# Patient Record
Sex: MALE | Race: WHITE | NOT HISPANIC OR LATINO | Employment: OTHER | URBAN - METROPOLITAN AREA
[De-identification: names, ages, dates, MRNs, and addresses within clinical notes are randomized per-mention and may not be internally consistent; named-entity substitution may affect disease eponyms.]

---

## 2017-01-19 ENCOUNTER — GENERIC CONVERSION - ENCOUNTER (OUTPATIENT)
Dept: OTHER | Facility: OTHER | Age: 67
End: 2017-01-19

## 2017-03-17 ENCOUNTER — TRANSCRIBE ORDERS (OUTPATIENT)
Dept: ADMINISTRATIVE | Facility: HOSPITAL | Age: 67
End: 2017-03-17

## 2017-03-17 ENCOUNTER — APPOINTMENT (OUTPATIENT)
Dept: LAB | Facility: MEDICAL CENTER | Age: 67
End: 2017-03-17
Payer: COMMERCIAL

## 2017-03-17 DIAGNOSIS — Z12.11 SPECIAL SCREENING FOR MALIGNANT NEOPLASMS, COLON: Primary | ICD-10-CM

## 2017-03-17 DIAGNOSIS — Z12.11 SPECIAL SCREENING FOR MALIGNANT NEOPLASMS, COLON: ICD-10-CM

## 2017-03-17 LAB — HEMOCCULT STL QL IA: POSITIVE

## 2017-03-17 PROCEDURE — G0328 FECAL BLOOD SCRN IMMUNOASSAY: HCPCS

## 2017-03-20 ENCOUNTER — GENERIC CONVERSION - ENCOUNTER (OUTPATIENT)
Dept: OTHER | Facility: OTHER | Age: 67
End: 2017-03-20

## 2017-04-03 ENCOUNTER — GENERIC CONVERSION - ENCOUNTER (OUTPATIENT)
Dept: OTHER | Facility: OTHER | Age: 67
End: 2017-04-03

## 2017-04-06 ENCOUNTER — GENERIC CONVERSION - ENCOUNTER (OUTPATIENT)
Dept: OTHER | Facility: OTHER | Age: 67
End: 2017-04-06

## 2017-04-18 ENCOUNTER — APPOINTMENT (OUTPATIENT)
Dept: LAB | Facility: MEDICAL CENTER | Age: 67
End: 2017-04-18
Payer: COMMERCIAL

## 2017-04-18 DIAGNOSIS — Z13.1 ENCOUNTER FOR SCREENING FOR DIABETES MELLITUS: ICD-10-CM

## 2017-04-18 DIAGNOSIS — R73.9 HYPERGLYCEMIA: ICD-10-CM

## 2017-04-18 DIAGNOSIS — Z86.39 PERSONAL HISTORY OF OTHER ENDOCRINE, NUTRITIONAL AND METABOLIC DISEASE: ICD-10-CM

## 2017-04-18 LAB
EST. AVERAGE GLUCOSE BLD GHB EST-MCNC: 108 MG/DL
GLUCOSE P FAST SERPL-MCNC: 101 MG/DL (ref 65–99)
HBA1C MFR BLD: 5.4 % (ref 4.2–6.3)
T3FREE SERPL-MCNC: 2.51 PG/ML (ref 2.3–4.2)
T4 FREE SERPL-MCNC: 0.78 NG/DL (ref 0.76–1.46)
TSH SERPL DL<=0.05 MIU/L-ACNC: 4.81 UIU/ML (ref 0.36–3.74)

## 2017-04-18 PROCEDURE — 84481 FREE ASSAY (FT-3): CPT

## 2017-04-18 PROCEDURE — 82947 ASSAY GLUCOSE BLOOD QUANT: CPT

## 2017-04-18 PROCEDURE — 84439 ASSAY OF FREE THYROXINE: CPT

## 2017-04-18 PROCEDURE — 84443 ASSAY THYROID STIM HORMONE: CPT

## 2017-04-18 PROCEDURE — 36415 COLL VENOUS BLD VENIPUNCTURE: CPT

## 2017-04-18 PROCEDURE — 83036 HEMOGLOBIN GLYCOSYLATED A1C: CPT

## 2017-05-03 ENCOUNTER — ALLSCRIPTS OFFICE VISIT (OUTPATIENT)
Dept: OTHER | Facility: OTHER | Age: 67
End: 2017-05-03

## 2017-05-03 DIAGNOSIS — H61.22 IMPACTED CERUMEN OF LEFT EAR: ICD-10-CM

## 2017-05-08 ENCOUNTER — APPOINTMENT (OUTPATIENT)
Dept: LAB | Facility: MEDICAL CENTER | Age: 67
End: 2017-05-08
Payer: COMMERCIAL

## 2017-05-08 ENCOUNTER — HOSPITAL ENCOUNTER (OUTPATIENT)
Dept: RADIOLOGY | Facility: MEDICAL CENTER | Age: 67
Discharge: HOME | End: 2017-05-08
Payer: COMMERCIAL

## 2017-05-08 DIAGNOSIS — H61.22 IMPACTED CERUMEN OF LEFT EAR: ICD-10-CM

## 2017-05-08 LAB — TSH SERPL DL<=0.05 MIU/L-ACNC: 4.17 UIU/ML (ref 0.36–3.74)

## 2017-05-08 PROCEDURE — 84443 ASSAY THYROID STIM HORMONE: CPT

## 2017-05-08 PROCEDURE — 36415 COLL VENOUS BLD VENIPUNCTURE: CPT

## 2017-05-08 PROCEDURE — 76536 US EXAM OF HEAD AND NECK: CPT

## 2017-05-16 ENCOUNTER — ALLSCRIPTS OFFICE VISIT (OUTPATIENT)
Dept: OTHER | Facility: OTHER | Age: 67
End: 2017-05-16

## 2017-08-15 ENCOUNTER — ALLSCRIPTS OFFICE VISIT (OUTPATIENT)
Dept: OTHER | Facility: OTHER | Age: 67
End: 2017-08-15

## 2017-10-11 DIAGNOSIS — R73.9 HYPERGLYCEMIA: ICD-10-CM

## 2017-10-11 DIAGNOSIS — E03.9 HYPOTHYROIDISM: ICD-10-CM

## 2017-10-24 ENCOUNTER — APPOINTMENT (OUTPATIENT)
Dept: LAB | Facility: MEDICAL CENTER | Age: 67
End: 2017-10-24
Payer: COMMERCIAL

## 2017-10-24 DIAGNOSIS — R73.9 HYPERGLYCEMIA: ICD-10-CM

## 2017-10-24 DIAGNOSIS — E03.9 HYPOTHYROIDISM: ICD-10-CM

## 2017-10-24 LAB
ALBUMIN SERPL BCP-MCNC: 3.5 G/DL (ref 3.5–5)
ALP SERPL-CCNC: 66 U/L (ref 46–116)
ALT SERPL W P-5'-P-CCNC: 26 U/L (ref 12–78)
ANION GAP SERPL CALCULATED.3IONS-SCNC: 9 MMOL/L (ref 4–13)
AST SERPL W P-5'-P-CCNC: 17 U/L (ref 5–45)
BASOPHILS # BLD AUTO: 0.03 THOUSANDS/ΜL (ref 0–0.1)
BASOPHILS NFR BLD AUTO: 0 % (ref 0–1)
BILIRUB SERPL-MCNC: 0.59 MG/DL (ref 0.2–1)
BUN SERPL-MCNC: 16 MG/DL (ref 5–25)
CALCIUM SERPL-MCNC: 9.2 MG/DL (ref 8.3–10.1)
CHLORIDE SERPL-SCNC: 105 MMOL/L (ref 100–108)
CHOLEST SERPL-MCNC: 175 MG/DL (ref 50–200)
CO2 SERPL-SCNC: 25 MMOL/L (ref 21–32)
CREAT SERPL-MCNC: 0.85 MG/DL (ref 0.6–1.3)
EOSINOPHIL # BLD AUTO: 0.21 THOUSAND/ΜL (ref 0–0.61)
EOSINOPHIL NFR BLD AUTO: 3 % (ref 0–6)
ERYTHROCYTE [DISTWIDTH] IN BLOOD BY AUTOMATED COUNT: 12.8 % (ref 11.6–15.1)
GFR SERPL CREATININE-BSD FRML MDRD: 90 ML/MIN/1.73SQ M
GLUCOSE P FAST SERPL-MCNC: 103 MG/DL (ref 65–99)
HCT VFR BLD AUTO: 40.8 % (ref 36.5–49.3)
HDLC SERPL-MCNC: 71 MG/DL (ref 40–60)
HGB BLD-MCNC: 14.3 G/DL (ref 12–17)
LDLC SERPL CALC-MCNC: 86 MG/DL (ref 0–100)
LYMPHOCYTES # BLD AUTO: 1.53 THOUSANDS/ΜL (ref 0.6–4.47)
LYMPHOCYTES NFR BLD AUTO: 19 % (ref 14–44)
MCH RBC QN AUTO: 32.9 PG (ref 26.8–34.3)
MCHC RBC AUTO-ENTMCNC: 35 G/DL (ref 31.4–37.4)
MCV RBC AUTO: 94 FL (ref 82–98)
MONOCYTES # BLD AUTO: 0.74 THOUSAND/ΜL (ref 0.17–1.22)
MONOCYTES NFR BLD AUTO: 9 % (ref 4–12)
NEUTROPHILS # BLD AUTO: 5.39 THOUSANDS/ΜL (ref 1.85–7.62)
NEUTS SEG NFR BLD AUTO: 69 % (ref 43–75)
NRBC BLD AUTO-RTO: 0 /100 WBCS
PLATELET # BLD AUTO: 224 THOUSANDS/UL (ref 149–390)
PMV BLD AUTO: 10.7 FL (ref 8.9–12.7)
POTASSIUM SERPL-SCNC: 4 MMOL/L (ref 3.5–5.3)
PROT SERPL-MCNC: 7.6 G/DL (ref 6.4–8.2)
RBC # BLD AUTO: 4.34 MILLION/UL (ref 3.88–5.62)
SODIUM SERPL-SCNC: 139 MMOL/L (ref 136–145)
T3FREE SERPL-MCNC: 2.71 PG/ML (ref 2.3–4.2)
T4 FREE SERPL-MCNC: 0.86 NG/DL (ref 0.76–1.46)
TRIGL SERPL-MCNC: 88 MG/DL
TSH SERPL DL<=0.05 MIU/L-ACNC: 2.95 UIU/ML (ref 0.36–3.74)
WBC # BLD AUTO: 7.91 THOUSAND/UL (ref 4.31–10.16)

## 2017-10-24 PROCEDURE — 36415 COLL VENOUS BLD VENIPUNCTURE: CPT

## 2017-10-24 PROCEDURE — 84481 FREE ASSAY (FT-3): CPT

## 2017-10-24 PROCEDURE — 84439 ASSAY OF FREE THYROXINE: CPT

## 2017-10-24 PROCEDURE — 85025 COMPLETE CBC W/AUTO DIFF WBC: CPT

## 2017-10-24 PROCEDURE — 84443 ASSAY THYROID STIM HORMONE: CPT

## 2017-10-24 PROCEDURE — 80061 LIPID PANEL: CPT

## 2017-10-24 PROCEDURE — 80053 COMPREHEN METABOLIC PANEL: CPT

## 2017-11-09 ENCOUNTER — ALLSCRIPTS OFFICE VISIT (OUTPATIENT)
Dept: OTHER | Facility: OTHER | Age: 67
End: 2017-11-09

## 2017-11-09 LAB
BILIRUB UR QL STRIP: NORMAL
CLARITY UR: NORMAL
COLOR UR: YELLOW
GLUCOSE (HISTORICAL): NORMAL
HGB UR QL STRIP.AUTO: NORMAL
KETONES UR STRIP-MCNC: NORMAL MG/DL
LEUKOCYTE ESTERASE UR QL STRIP: NORMAL
NITRITE UR QL STRIP: NORMAL
PH UR STRIP.AUTO: 5 [PH]
PROT UR STRIP-MCNC: NORMAL MG/DL
SP GR UR STRIP.AUTO: 1.02
UROBILINOGEN UR QL STRIP.AUTO: NORMAL

## 2017-12-12 ENCOUNTER — ALLSCRIPTS OFFICE VISIT (OUTPATIENT)
Dept: OTHER | Facility: OTHER | Age: 67
End: 2017-12-12

## 2017-12-13 NOTE — PROGRESS NOTES
Assessment    1  Glenohumeral arthritis, right (715 91) (M19 011)    Plan  Glenohumeral arthritis, right    · Follow-up visit in 3 months Evaluation and Treatment  Follow-up  Status: Hold For -Scheduling  Requested for: [id number]    Chief Complaint    1  Shoulder Pain    Discussion/Summary    We again discussed treatment options  We discussed continued conservative management versus surgical intervention  He does not want to pursue any surgical options at this time  He elects to proceed with repeat corticosteroid injection which was performed without complication  Follow-up 3-4 months as needed for repeat injection  History of Present Illness  71-year-old male here for follow-up of right shoulder pain  He had excellent relief after corticosteroid injection performed approximately 4 months ago  He started to have insidious onset of worsening pain in the anterior aspect of the shoulder few weeks ago  No new injuries  No other complaints  Review of Systems   Constitutional: No fever or chills, feels well, no tiredness, no recent weight loss or weight gain  Musculoskeletal: as noted in HPI  Neurological: No complaints of headache, no confusion, no numbness or tingling, no dizziness  Active Problems  1  Bilateral impacted cerumen (380 4) (H61 23)   2  Elevated blood sugar level (790 29) (R73 9)   3  Elevated PSA (790 93) (R97 20)   4  Encounter for screening for lipid disorder (V77 91) (Z13 220)   5  Encounter for screening for malignant neoplasm of prostate (V76 44) (Z12 5)   6  Glenohumeral arthritis, right (715 91) (M19 011)   7  Hypothyroidism (244 9) (E03 9)   8  Medicare annual wellness visit, initial (V70 0) (Z00 00)   9  Medicare annual wellness visit, subsequent (V70 0) (Z00 00)   10  Need for influenza vaccination (V04 81) (Z23)   11  Screening for colon cancer (V76 51) (Z12 11)   12  Screening for diabetes mellitus (V77 1) (Z13 1)   13   Screening for thyroid disorder (V77 0) (Z13 29)    Past Medical History   · History of Denial of substance abuse   · Denied: History of mental disorder   · History of thyroid disease (V12 29) (Z86 39)   · History of Left knee pain (719 46) (M25 562)   · History of Primary osteoarthritis of left knee (715 16) (M17 12)   · History of Shoulder pain (719 41) (M25 519)    The active problems and past medical history were reviewed and updated today  Surgical History   · History of Radiation Therapy    Family History  Mother    · Family history of dementia (V17 2) (Z80 11)  Father    · Family history of congestive heart failure (V17 49) (Z82 49)  Family History    · Denied: Family history of Denial of substance abuse   · Denied: No family history of mental disorder    Social History     · Never a smoker   · Occasional alcohol use  The social history was reviewed and updated today  The social history was reviewed and is unchanged  Current Meds   1  Move Free 500-400 MG TABS; Therapy: (Recorded:26Oxs9291) to Recorded   2  Saw Palmetto Oral Capsule; TAKE 1 CAPSULE; Therapy: 99ARF6831 to Recorded    The medication list was reviewed and updated today  Allergies  1  No Known Drug Allergies    Vitals   Recorded: 88Fya4696 10:44AM   Heart Rate 70   Systolic 115   Diastolic 91   Height 5 ft 8 5 in   Weight 201 lb    BMI Calculated 30 12   BSA Calculated 2 05       Physical Exam   Constitutional - General appearance: Normal   Musculoskeletal - Gait and station: Normal   Right shoulder: Skin is intact  There is no tenderness  Active forward elevation 150Â°, external rotation 55Â°, internal rotation to T12  Negative belly press  Strength is 5 out of 5 in elevation, supraspinatus, internal rotation, external rotation  Sensation is intact to light touch in axillary, lateral antebrachial cutaneous, median, ulnar, and radial distributions  Radial pulse 2+      Procedure    Procedure: Injection of the right glenohumeral joint  Indication:  osteoarthritis   Potential complications include bleeding,-- infection-- and-- allergic reaction  Risk, benefits and alternatives were discussed with the patient  Verbal consent was obtained prior to the procedure  Alcohol was used to prep the area  ethyl chloride spray was used as a topical anesthetic  Using sterile technique, the aspiration/injection needle was then directed from a anterior aspect  Was used to inject 21,-- 4 mL of 1% Lidocaine,-- 4 mL 0 25% Bupivacaine-- and-- 1 mL of 40mg/mL triamcinolone  A bandage was applied  the patient tolerated the procedure well  Complications: none  Future Appointments    Date/Time Provider Specialty Site   05/09/2018 01:00 PM JUDY Mack   Family Medicine [facility]       Signatures   Electronically signed by : JUDY Moore ; Dec 12 2017 11:08AM EST                       (Author)

## 2018-01-10 NOTE — MISCELLANEOUS
Message   Recorded as Task   Date: 03/19/2017 04:46 PM, Created By: Faisal Guevara   Task Name: Result Follow Up   Assigned To: Lucio Hargrove   Regarding Patient: Darrian Benites, Status: Active   Comment:    Corry Augustin - 19 Mar 2017 4:46 PM     TASK CREATED  Positive FIT   spoke with pt  positve fobt  colonoscopy ordered  mail referral to pt's home      Plan  Fecal occult blood test positive, Screening for colon cancer    · 2 - *[facility]OLANDO&KAMIL[facility] ( COLORECTAL SURGERY, GASTROENTEROLOGY) Physician  Referral  Consult Only: the expectation is that the  referring provider will communicate back to the patient on treatment options  Evaluation  and Treatment: the expectation is that the referred to provider will communicate back  to the patient on treatment options    Status: Hold For - Scheduling  Requested  for: 15QCY8048  Care Summary provided  : Yes    Signatures   Electronically signed by : Bishop Benavides, [facility]; Mar 20 2017 12:59PM EST                       (Author)

## 2018-01-10 NOTE — PROGRESS NOTES
Assessment    1  Elevated blood sugar level (790 29) (R73 9)   2  Bilateral impacted cerumen (380 4) (H61 23)   3  Elevated PSA (790 93) (R97 20)   4  Hypothyroidism (244 9) (E03 9)   5  Medicare annual wellness visit, subsequent (V70 0) (Z00 00)    Plan  Elevated blood sugar level, Elevated PSA    · (1) CBC/PLT/DIFF; Status:Active; Requested for:01May2018;    · (1) COMPREHENSIVE METABOLIC PANEL; Status:Active; Requested for:01May2018;    · (1) HEMOGLOBIN A1C; Status:Active; Requested for:01May2018;    · (1) PSA, DIAGNOSTIC (FOLLOW-UP); Status:Active; Requested for:01May2018;   Elevated blood sugar level, Elevated PSA, Hypothyroidism    · (1) LIPID PANEL, FASTING; Status:Active; Requested for:01May2018;    · (1) TSH; Status:Active; Requested for:01May2018; Health Maintenance    · Urine Dip Non-Automated- POC; Status:Complete;   Done: 78LRU8215 02:36PM    Discussion/Summary    Check labs in 6 months before next visit, continue healthy habits  Impression: Subsequent Annual Wellness Visit  Cardiovascular screening and counseling: screening is current  Diabetes screening and counseling: screening is current  Colorectal cancer screening and counseling: screening is current  Prostate cancer screening and counseling: due for PSA  Glaucoma screening and counseling: screening is current  Immunizations: influenza vaccine is up to date this year, influenza vaccination is recommended annually, the lifetime pneumococcal vaccine has been completed and Zostavax vaccination up to date  Advance Directive Planning: complete and up to date  Patient Discussion: plan discussed with the patient  Possible side effects of new medications were reviewed with the patient/guardian today  The treatment plan was reviewed with the patient/guardian  The patient/guardian understands and agrees with the treatment plan      Chief Complaint  Patient presents for Annual Well visit        History of Present Illness  HPI: here for yoselin   Welcome to [employer] and Wellness Visits: The patient is being seen for the subsequent annual wellness visit  Medicare Screening and Risk Factors   Hospitalizations: he has been previously hospitalizied and nothing in the last year  Medicare Screening Tests Risk Questions   Abdominal aortic aneurysm risk assessment: none indicated  Osteoporosis risk assessment: none indicated  HIV risk assessment: none indicated  Drug and Alcohol Use: The patient has never smoked cigarettes  The patient reports occasional alcohol use  Diet and Physical Activity: Current diet includes well balanced meals and 1-2 cups of tea per day  He exercises daily  Exercise: walking, stretching, strength training  Mood Disorder and Cognitive Impairment Screening: Anxiety screening no anxiety  Depression screening  negative for symptoms  Functional Ability/Level of Safety: Hearing is slightly decreased  He reports hearing difficulties  He does not use a hearing aid  The patient is currently able to do activities of daily living without limitations, able to do instrumental activities of daily living without limitations, able to participate in social activities without limitations and able to drive without limitations  Advance Directives: Advance directives: living will  Co-Managers and Medical Equipment/Suppliers: See Patient Care Team      Patient Care Team    Care Team Member Role Specialty Office Number   Franci Waddell [facility]  Family Medicine [phone]     Active Problems    1  Elevated blood sugar level (790 29) (R73 9)   2  Elevated PSA (790 93) (R97 20)   3  Encounter for screening for lipid disorder (V77 91) (Z13 220)   4  Encounter for screening for malignant neoplasm of prostate (V76 44) (Z12 5)   5  Glenohumeral arthritis, right (715 91) (M19 011)   6  Hypothyroidism (244 9) (E03 9)   7  Medicare annual wellness visit, initial (V70 0) (Z00 00)   8  Need for influenza vaccination (V04 81) (Z23)   9  Screening for colon cancer (V76 51) (Z12 11)   10  Screening for diabetes mellitus (V77 1) (Z13 1)   11  Screening for thyroid disorder (V77 0) (Z13 29)    Past Medical History    · History of Denial of substance abuse   · Denied: History of mental disorder   · History of thyroid disease (V12 29) (Z86 39)    Surgical History    · History of Radiation Therapy    The surgical history was reviewed and updated today  Family History  Mother    · Family history of dementia (V17 2) (Z80 11)  Father    · Family history of congestive heart failure (V17 49) (Z82 49)  Family History    · Denied: Family history of Denial of substance abuse   · Denied: No family history of mental disorder    The family history was reviewed and updated today  Social History    · Never a smoker   · Occasional alcohol use  The social history was reviewed and updated today  Current Meds   1  Move Free 500-400 MG TABS; Therapy: (Recorded:09Nov2017) to Recorded   2  Saw Palmetto Oral Capsule; TAKE 1 CAPSULE; Therapy: 16DSM2655 to Recorded    The medication list was reviewed and updated today  Allergies    1  No Known Drug Allergies    Immunizations   1    Influenza  13-Oct-2016    PPSV  Approx [id number]     Vitals  Signs    Temperature: 97 3 F, Tympanic  Heart Rate: 91, R Brachial Artery  Pulse Quality: Normal, R Brachial Artery  Respiration Quality: Normal  Systolic: 493, LUE, Sitting  Diastolic: 76, LUE, Sitting  BP Cuff Size: Large  Height: 5 ft 8 5 in  Weight: 201 lb   BMI Calculated: 30 12  BSA Calculated: 2 06  O2 Saturation: 98    Physical Exam    Constitutional   General appearance: No acute distress, well appearing and well nourished  Eyes   Conjunctiva and lids: No erythema, swelling or discharge  Pupils and irises: Equal, round, reactive to light  Ears, Nose, Mouth, and Throat   External inspection of ears and nose: Normal     Otoscopic examination: Abnormal   cerumen impaction b/l     Hearing: Abnormal  Nasal mucosa, septum, and turbinates: Normal without edema or erythema  Lips, teeth, and gums: Normal, good dentition  Oropharynx: Normal with no erythema, edema, exudate or lesions  Neck   Neck: Supple, symmetric, trachea midline, no masses  Thyroid: Normal, no thyromegaly  Pulmonary   Respiratory effort: No increased work of breathing or signs of respiratory distress  Auscultation of lungs: Clear to auscultation  Cardiovascular   Auscultation of heart: Normal rate and rhythm, normal S1 and S2, no murmurs  Examination of extremities for edema and/or varicosities: Normal     Abdomen   Abdomen: Non-tender, no masses  Liver and spleen: No hepatomegaly or splenomegaly  Lymphatic   Palpation of lymph nodes in neck: No lymphadenopathy  Musculoskeletal   Gait and station: Normal     Inspection/palpation of joints, bones, and muscles: Normal     Range of motion: Normal     Stability: Normal     Muscle strength/tone: Normal     Neurologic   Cortical function: Normal mental status  Reflexes: 2+ and symmetric  Psychiatric   Judgment and insight: Normal     Orientation to person, place and time: Normal     Recent and remote memory: Intact  Mood and affect: Normal        Results/Data  Urine Dip Non-Automated- POC 01XKA2306 02:36PM Neves Man     Test Name Result Flag Reference   Color Yellow     Clarity Transparent     Leukocytes NEG     Nitrite NEG     Blood NEG     Bilirubin NEG     Urobilinogen NEG     Protein NEG     Ph 5     Specific Gravity 1 020     Ketone NEG     Glucose NEG         Procedure    Procedure: cerumen removal    Indication: tympanic membrane(s) could not be visualized and cerumen impaction in both ears  Procedure Note: The procedure was performed by the Provider  A otoscope was placed in the ear canal(s) to visualize the ear canal debris  The ear was cleaned by using warm water irrigation  The procedure was partially successful     Post-Procedure: Patient Status: the patient tolerated the procedure well  Complications: there were no complications  Follow-up in 5 day(s) and try debrox to loosen wax in L ear  Health Management  History of Fecal occult blood test positive   COLONOSCOPY; every 10 years; Last [id number]; Next Due: 05Rgs3022; Active  Screening for colon cancer   COLONOSCOPY; every 10 years; Last 46BTV1820; Next Due: 41Oks0016; Active    Future Appointments    Date/Time Provider Specialty Site   05/09/2018 01:00 PM JUDY Michelle  [address] [place] [address]   12/12/2017 10:45 AM JUDY Higgins   [facility]     Signatures   Electronically signed by : JUDY Nino ; Nov 9 2017  8:58PM EST                       (Author)

## 2018-01-12 VITALS
BODY MASS INDEX: 27.6 KG/M2 | DIASTOLIC BLOOD PRESSURE: 81 MMHG | SYSTOLIC BLOOD PRESSURE: 122 MMHG | HEART RATE: 84 BPM | WEIGHT: 197.19 LBS | HEIGHT: 71 IN

## 2018-01-12 NOTE — PROGRESS NOTES
Assessment    1  Medicare annual wellness visit, initial (V70 0) (Z00 00)    Plan  Elevated blood sugar level    · (1) HEMOGLOBIN A1C; Status:Active; Requested for:01Apr2017;    · (1) TSH; Status:Active; Requested for:01Apr2017;   Elevated PSA    · *1 - [facility] Physician Referral  Consult  Status: Permanent  Deferral - pt  declines at present  time  Care Summary provided  : Yes  PMH: History of thyroid disease    · (1) FREE T3; Status:Active; Requested for:01Apr2017;    · (1) T4, FREE; Status:Active; Requested for:01Apr2017;   PMH: History of thyroid disease, Screening for diabetes mellitus    · (1) GLUCOSE,  FASTING; Status:Active; Requested for:01Apr2017;   Screening for colon cancer    · (1) OCCULT BLOOD, FECAL IMMUNOCHEMICAL TEST; Status:Active; Requested  for:02Nov2016;     Discussion/Summary    Repeat fbs and thyroid labs in 6 months  psa in one year  Impression: Initial Annual Wellness Visit, with preventive exam as well as age and risk appropriate counseling completed  Diabetes screening and counseling: screening is current  Colorectal cancer screening and counseling: the patient declines screening and due for fecal occult blood testing  Prostate cancer screening and counseling: screening is current  Osteoporosis screening and counseling: counseling was given on the importance of regular weightbearing exercise  Abdominal aortic aneurysm screening and counseling: screening not indicated  Glaucoma screening and counseling: screening is current  HIV screening and counseling: screening not indicated  Immunizations: influenza vaccine is up to date this year, prevnar 13 due next year  and the patient declines the Zostavax vaccine  Advance Directive Planning: complete and up to date, need copy for chart  Patient Discussion: follow-up visit needed in one year        Chief Complaint  AWV      History of Present Illness  HPI: pt presents for nanual well visit  labs reviewed with pt t4 slighly low but tsha dn t3 are normal  pt gives hostory of thyroid disorder as a child  will repeat tsh and t4 in 6 months  fbs is 110  pt follows alow sugar diet  exxercise discussed  will recheck fbs and hba1c in 6 months  lipds at goal with diet  psa is elevated at 6  pt states this is lower than last year  he has been to urology in the past and does not feel the need to go back to urology at this time  his homeopathic physician put him on saw palmetto, old records need to be obtained from prior physciian  pt did bring in some old labs  Welcome to [employer] and Wellness Visits: The patient is being seen for the initial annual wellness visit  Medicare Screening and Risk Factors   Hospitalizations: no previous hospitalizations  Once per lifetime medicare screening tests: ECG has not been done  Medicare Screening Tests Risk Questions   Abdominal aortic aneurysm risk assessment: family history of AAA  Osteoporosis risk assessment: , over 48years of age, past medical history of fracture(s) and wrist    HIV risk assessment: none indicated  Drug and Alcohol Use: The patient has never smoked cigarettes  The patient reports frequent alcohol use and drinking 1 drinks per day  He has never used illicit drugs  Diet and Physical Activity: Current diet includes well balanced meals  He exercises daily and yard work  Exercise: yard work  Mood Disorder and Cognitive Impairment Screening: He denies feeling down, depressed, or hopeless over the past two weeks  He denies feeling little interest or pleasure in doing things over the past two weeks  Cognitive impairment screening: denies difficulty learning/retaining new information, denies difficulty handling complex tasks, denies difficulty with reasoning, denies difficulty with spatial ability and orientation, denies difficulty with language and denies difficulty with behavior     Functional Ability/Level of Safety: Hearing is slightly decreased in the right ear, significantly decreased in the left ear and a hearing aid is not used  The patient is currently able to do activities of daily living without limitations, able to do instrumental activities of daily living without limitations, able to participate in social activities without limitations and able to drive without limitations  Activities of daily living details: does not need help using the phone, no transportation help needed, does not need help shopping, no meal preparation help needed, does not need help doing housework, does not need help doing laundry, does not need help managing medications and does not need help managing money  Fall risk factors:  alcohol use, but The patient fell 0 times in the past 12 months , no antidepressant use, no deconditioning, no visual impairment, no urinary incontinence, no antihypertensive use, no cognitive impairment and no previous fall  Home safety risk factors:  no unfamiliar surroundings, no loose rugs, no poor household lighting, no uneven floors, no household clutter, grab bars in the bathroom and handrails on the stairs  Advance Directives: Advance directives: living will, durable power of  for health care directives and advance directives  Co-Managers and Medical Equipment/Suppliers: See Patient Care Team      Patient Care Team    Care Team Member Role Specialty Office Number   Katia Bun [address] [facility] [phone]     Review of Systems    Constitutional: no fever  ENT: no sore throat and no nasal congestion  Cardiovascular: no chest pain and no lower extremity edema  Respiratory: no shortness of breath, no wheezing and no cough  Gastrointestinal: no abdominal pain, no nausea, no vomiting, no diarrhea, no constipation, no bright red blood per rectum and no melena  Genitourinary: nocturia, but no dysuria, no urinary incontinence and no urinary hesitancy  Musculoskeletal: joint stiffness     Integumentary and Breasts: no rashes and no edema  Neurological: no headache and no dizziness  Psychiatric: no insomnia  Hematologic and Lymphatic: no tendency for easy bleeding and no tendency for easy bruising  Active Problems    1  Elevated PSA (790 93) (R97 20)   2  Encounter for screening for lipid disorder (V77 91) (Z13 220)   3  Encounter for screening for malignant neoplasm of prostate (V76 44) (Z12 5)   4  Glenohumeral arthritis, right (715 91) (M19 011)   5  Internal derangement of knee, left (717 9) (M23 92)   6  Left knee pain (719 46) (M25 562)   7  Need for influenza vaccination (V04 81) (Z23)   8  Primary osteoarthritis of left knee (715 16) (M17 12)   9  Screening for diabetes mellitus (V77 1) (Z13 1)   10  Screening for thyroid disorder (V77 0) (Z13 29)   11  Shoulder joint stiffness, right (719 51) (M25 611)   12  Shoulder pain (719 41) (M25 519)   13  Swelling of knee joint, left (719 06) (M25 462)    Past Medical History    · History of thyroid disease (V12 29) (Z86 39)    Surgical History    · History of Radiation Therapy    Social History    · Never a smoker   · None   · Occasional alcohol use  The social history was reviewed and updated today  The social history was reviewed and is unchanged  Current Meds   1  Saw [place] Oral Capsule; TAKE 1 CAPSULE; Therapy: [id number] to Recorded    The medication list was reviewed and updated today  Allergies    1  No Known Drug Allergies    Immunizations   1    Influenza  13-Oct-2016    PPSV  Approx [id number]     Vitals  Signs    Systolic: 771, LUE, Sitting  Diastolic: 88, LUE, Sitting  Heart Rate: 80, L Brachial Artery  Pulse Quality: Normal, L Brachial Artery  Respiration Quality: Normal  Respiration: 16  Temperature: 97 5 F, Tympanic  Height: 5 ft 11 in  Weight: 201 lb   BMI Calculated: 28 03  BSA Calculated: 2 11    Physical Exam    Constitutional   General appearance: No acute distress, well appearing and well nourished      Head and Face   Head and face: Normal     Eyes   Conjunctiva and lids: No erythema, swelling or discharge  Pupils and irises: Equal, round, reactive to light  Ears, Nose, Mouth, and Throat   External inspection of ears and nose: Normal     Otoscopic examination: Tympanic membranes translucent with normal light reflex  Canals patent without erythema  Lips, teeth, and gums: Normal, good dentition  Oropharynx: Normal with no erythema, edema, exudate or lesions  Neck   Neck: Supple, symmetric, trachea midline, no masses  Thyroid: Normal, no thyromegaly  Pulmonary   Respiratory effort: No increased work of breathing or signs of respiratory distress  Auscultation of lungs: Clear to auscultation  Cardiovascular   Auscultation of heart: Normal rate and rhythm, normal S1 and S2, no murmurs  Carotid pulses: 2+ bilaterally  Examination of extremities for edema and/or varicosities: Normal     Abdomen   Abdomen: Non-tender, no masses  Liver and spleen: No hepatomegaly or splenomegaly  Lymphatic   Palpation of lymph nodes in neck: No lymphadenopathy  Musculoskeletal   Gait and station: Normal     Inspection/palpation of digits and nails: Normal without clubbing or cyanosis  Examination of the extremities revealed no fingernail clubbing and well perfused fingers  Muscle strength/tone: Normal     Skin   Skin and subcutaneous tissue: Normal without rashes or lesions  Neurologic   Cranial nerves: Cranial nerves 2-12 intact  Cranial Nerve II: visual acuity and visual fields were intact  Cranial Nerves III, IV, and VI: the extraocular motions were intact  Cranial Nerve V: sensation to the face and masseter strength were intact  Cranial Nerve VII: facial strength was intact bilaterally  Reflexes: 2+ and symmetric  Deep tendon reflexes: 2+ right brachioradialis, 2+ left brachioradialis, 2+ right patella and 2+ left patella     Psychiatric   Judgment and insight: Normal     Mood and affect: Normal  Procedure    Procedure: Visual Acuity Test    Indication: routine screening  Inforrmation supplied by a Snellen chart  Results: 20/13 in both eyes with corrective device, 20/13 in the right eye with corrective device, 20/13 in the left eye with corrective device normal in both eyes  Color vision was and the results were normal    The patient tolerated the procedure well  There were no complications        Future Appointments    Date/Time Provider Specialty Site   05/03/2017 01:15 PM Elba Doherty, [facility]     Signatures   Electronically signed by : Chace Larios, [facility]; Nov  3 2016 12:08PM EST                       (Author)    Electronically signed by : JUDY Melendez ; Nov  3 2016 12:26PM EST                       (Author)

## 2018-01-13 VITALS
SYSTOLIC BLOOD PRESSURE: 122 MMHG | HEART RATE: 87 BPM | TEMPERATURE: 97 F | RESPIRATION RATE: 16 BRPM | OXYGEN SATURATION: 96 % | DIASTOLIC BLOOD PRESSURE: 86 MMHG | HEIGHT: 71 IN | WEIGHT: 199.2 LBS | BODY MASS INDEX: 27.89 KG/M2

## 2018-01-14 VITALS
HEIGHT: 69 IN | DIASTOLIC BLOOD PRESSURE: 76 MMHG | WEIGHT: 201 LBS | TEMPERATURE: 97.3 F | OXYGEN SATURATION: 98 % | SYSTOLIC BLOOD PRESSURE: 120 MMHG | HEART RATE: 91 BPM | BODY MASS INDEX: 29.77 KG/M2

## 2018-01-15 VITALS
BODY MASS INDEX: 27.89 KG/M2 | HEIGHT: 71 IN | SYSTOLIC BLOOD PRESSURE: 123 MMHG | WEIGHT: 199.19 LBS | HEART RATE: 85 BPM | DIASTOLIC BLOOD PRESSURE: 77 MMHG

## 2018-01-15 NOTE — PROGRESS NOTES
Assessment    1  Elevated blood sugar level (790 29) (R73 9)   2  Bilateral impacted cerumen (380 4) (H61 23)   3  Elevated PSA (790 93) (R97 20)   4  Hypothyroidism (244 9) (E03 9)   5  Medicare annual wellness visit, subsequent (V70 0) (Z00 00)    Plan  Elevated blood sugar level, Elevated PSA    · (1) CBC/PLT/DIFF; Status:Active; Requested for:01May2018;    · (1) COMPREHENSIVE METABOLIC PANEL; Status:Active; Requested for:01May2018;    · (1) HEMOGLOBIN A1C; Status:Active; Requested for:01May2018;    · (1) PSA, DIAGNOSTIC (FOLLOW-UP); Status:Active; Requested for:01May2018;   Elevated blood sugar level, Elevated PSA, Hypothyroidism    · (1) LIPID PANEL, FASTING; Status:Active; Requested for:01May2018;    · (1) TSH; Status:Active; Requested for:01May2018; Health Maintenance    · Urine Dip Non-Automated- POC; Status:Complete;   Done: [id number] 02:36PM    Discussion/Summary  health maintenance visit Currently, he eats a healthy diet and has an adequate exercise regimen  Prostate cancer screening: PSA was ordered  Colorectal cancer screening: colorectal cancer screening is current  The immunizations are up to date  Check labs in 6 months before next visit, continue healthy habits  Possible side effects of new medications were reviewed with the patient/guardian today  The treatment plan was reviewed with the patient/guardian  The patient/guardian understands and agrees with the treatment plan      Chief Complaint  here for physical and to discuss chronic conditions      History of Present Illness  , Adult Male: The patient is being seen for a health maintenance evaluation  General Health: The patient's health since the last visit is described as good  He has regular dental visits  He complains of vision problems  Vision care includes having regular eye examinations  He has hearing loss  Immunizations status: up to date  Lifestyle:  He consumes a diverse and healthy diet   He does not have any weight concerns  He exercises regularly  He does not use tobacco  He consumes alcohol  He denies drug use  Reproductive health:  the patient is sexually active  He denies erectile dysfunction  Screening:   HPI: here to review labs, lipids improved with small changes in diet, stays active  PSA has been high in the past, runs   5-6  at recent colonoscopy, prostate exam was normal  no meds, few supplements  healthy eating and regular exercise  Review of Systems    Constitutional: No fever or chills, feels well, no tiredness, no recent weight gain or weight loss  Cardiovascular: No complaints of slow heart rate, no fast heart rate, no chest pain, no palpitations, no leg claudication, no lower extremity  Respiratory: No complaints of shortness of breath, no wheezing, no cough, no SOB on exertion, no orthopnea or PND  Gastrointestinal: No complaints of abdominal pain, no constipation, no nausea or vomiting, no diarrhea or bloody stools  Neurological: No compliants of headache, no confusion, no convulsions, no numbness or tingling, no dizziness or fainting, no limb weakness, no difficulty walking  Hematologic/Lymphatic: No complaints of swollen glands, no swollen glands in the neck, does not bleed easily, no easy bruising  ROS reviewed  Active Problems    1  Elevated blood sugar level (790 29) (R73 9)   2  Elevated PSA (790 93) (R97 20)   3  Encounter for screening for lipid disorder (V77 91) (Z13 220)   4  Encounter for screening for malignant neoplasm of prostate (V76 44) (Z12 5)   5  Glenohumeral arthritis, right (715 91) (M19 011)   6  Hypothyroidism (244 9) (E03 9)   7  Medicare annual wellness visit, initial (V70 0) (Z00 00)   8  Need for influenza vaccination (V04 81) (Z23)   9  Screening for colon cancer (V76 51) (Z12 11)   10  Screening for diabetes mellitus (V77 1) (Z13 1)   11   Screening for thyroid disorder (V77 0) (Z13 29)    Past Medical History    · History of Denial of substance abuse · Denied: History of mental disorder   · History of thyroid disease (V12 29) (Z86 39)   · History of Left knee pain (719 46) (M25 562)   · History of Primary osteoarthritis of left knee (715 16) (M17 12)   · History of Shoulder pain (719 41) (M25 519)    Surgical History    · History of Radiation Therapy    Family History  Mother    · Family history of dementia (V17 2) (Z80 11)  Father    · Family history of congestive heart failure (V17 49) (Z82 49)  Family History    · Denied: Family history of Denial of substance abuse   · Denied: No family history of mental disorder    Social History    · Never a smoker   · Occasional alcohol use    Current Meds   1  Move Free 500-400 MG TABS; Therapy: (Recorded:09Nov2017) to Recorded   2  Saw [place] Oral Capsule; TAKE 1 CAPSULE; Therapy: [id number] to Recorded    Allergies    1  No Known Drug Allergies    Vitals   Recorded: 82LJD1704 02:09PM   Temperature 97 3 F, Tympanic   Heart Rate 91, R Brachial Artery   Pulse Quality Normal, R Brachial Artery   Respiration Quality Normal   Systolic 772, LUE, Sitting   Diastolic 76, LUE, Sitting   BP CUFF SIZE Large   Height 5 ft 8 5 in   Weight 201 lb    BMI Calculated 30 12   BSA Calculated 2 06   O2 Saturation 98     Physical Exam    Constitutional   General appearance: No acute distress, well appearing and well nourished  Eyes   Conjunctiva and lids: No erythema, swelling or discharge  Pupils and irises: Equal, round, reactive to light  Ears, Nose, Mouth, and Throat   External inspection of ears and nose: Normal     Otoscopic examination: Abnormal   cerumen impaction b/l  Hearing: Abnormal     Nasal mucosa, septum, and turbinates: Normal without edema or erythema  Lips, teeth, and gums: Normal, good dentition  Oropharynx: Normal with no erythema, edema, exudate or lesions  Neck   Neck: Supple, symmetric, trachea midline, no masses  Thyroid: Normal, no thyromegaly      Pulmonary   Respiratory effort: No increased work of breathing or signs of respiratory distress  Auscultation of lungs: Clear to auscultation  Cardiovascular   Auscultation of heart: Normal rate and rhythm, normal S1 and S2, no murmurs  Examination of extremities for edema and/or varicosities: Normal     Abdomen   Abdomen: Non-tender, no masses  Liver and spleen: No hepatomegaly or splenomegaly  Lymphatic   Palpation of lymph nodes in neck: No lymphadenopathy  Musculoskeletal   Gait and station: Normal     Inspection/palpation of joints, bones, and muscles: Normal     Range of motion: Normal     Stability: Normal     Muscle strength/tone: Normal     Neurologic   Cortical function: Normal mental status  Reflexes: 2+ and symmetric  Psychiatric   Judgment and insight: Normal     Orientation to person, place and time: Normal     Recent and remote memory: Intact  Mood and affect: Normal        Results/Data  Urine Dip Non-Automated- POC [id number] 02:36PM [facility] Distance     Test Name Result Flag Reference   Color Yellow     Clarity Transparent     Leukocytes NEG     Nitrite NEG     Blood NEG     Bilirubin NEG     Urobilinogen NEG     Protein NEG     Ph 5     Specific Gravity 1 020     Ketone NEG     Glucose NEG         Procedure    Procedure: Visual Acuity Test    Indication: routine screening  Results: 20/20 in both eyes with corrective device, 20/25 in the right eye with corrective device, 20/20 in the left eye with corrective device   Color vision was screened with the Ishihara Test and the results were normal      Procedure: Indication: Routine screeing  Audiometry:   Hearing in the right ear: 40 decibals at 500 hertz, 0 decibals at 1000 hertz, 0 decibals at 2000 hertz and 0 decibals at 4000 hertz  Hearing in the left ear: 0 decibals at 500 hertz, 0 decibals at 1000 hertz, 0 decibals at 2000 hertz and 0 decibals at 4000 hertz         Health Management  History of Fecal occult blood test positive   COLONOSCOPY; every 10 years; Last [id number]; Next Due: 13Sdl5297; Active  Screening for colon cancer   COLONOSCOPY; every 10 years; Last 46FBU6797; Next Due: 61Vlx9494; Active    Future Appointments    Date/Time Provider Specialty Site   05/09/2018 01:00 PM JUDY Patterson  [address] [place] [address]   12/12/2017 10:45 AM JUDY Walters   [facility]     Signatures   Electronically signed by : JUDY Singh ; Nov 9 2017  8:53PM EST                       (Author)

## 2018-01-23 VITALS
WEIGHT: 201 LBS | BODY MASS INDEX: 29.77 KG/M2 | SYSTOLIC BLOOD PRESSURE: 147 MMHG | DIASTOLIC BLOOD PRESSURE: 91 MMHG | HEIGHT: 69 IN | HEART RATE: 70 BPM

## 2018-02-19 ENCOUNTER — TELEPHONE (OUTPATIENT)
Dept: FAMILY MEDICINE CLINIC | Facility: CLINIC | Age: 68
End: 2018-02-19

## 2018-02-19 ENCOUNTER — APPOINTMENT (OUTPATIENT)
Dept: RADIOLOGY | Facility: MEDICAL CENTER | Age: 68
End: 2018-02-19
Payer: COMMERCIAL

## 2018-02-19 ENCOUNTER — OFFICE VISIT (OUTPATIENT)
Dept: URGENT CARE | Facility: MEDICAL CENTER | Age: 68
End: 2018-02-19
Payer: COMMERCIAL

## 2018-02-19 VITALS
BODY MASS INDEX: 26.6 KG/M2 | RESPIRATION RATE: 16 BRPM | WEIGHT: 190 LBS | DIASTOLIC BLOOD PRESSURE: 89 MMHG | HEART RATE: 75 BPM | SYSTOLIC BLOOD PRESSURE: 136 MMHG | HEIGHT: 71 IN

## 2018-02-19 DIAGNOSIS — S89.90XA KNEE INJURY, INITIAL ENCOUNTER: Primary | ICD-10-CM

## 2018-02-19 DIAGNOSIS — M25.562 ACUTE PAIN OF LEFT KNEE: Primary | ICD-10-CM

## 2018-02-19 PROCEDURE — 73562 X-RAY EXAM OF KNEE 3: CPT

## 2018-02-19 PROCEDURE — 99203 OFFICE O/P NEW LOW 30 MIN: CPT

## 2018-02-19 RX ORDER — COVID-19 ANTIGEN TEST
KIT MISCELLANEOUS
COMMUNITY
End: 2018-03-27

## 2018-02-19 NOTE — PROGRESS NOTES
Had knee injury 2 years ago, had testing  No further issues until one week ago became swollen  Today made a quick movement and he began having a lot of pain  Has appointment for tomorrow with Ortho

## 2018-02-19 NOTE — PROGRESS NOTES
[address] [facility]        NAME: Mraley Redd is a 79 y o  male  : 1950    MRN: [id number]  DATE: 2018  TIME: 6:04 PM    Assessment and Plan   Acute pain of left knee [M25 562]  1  Acute pain of left knee  X-ray knee left 3 views         Patient Instructions     Follow up with PCP in 3-5 days  Proceed to  ER if symptoms worsen  Chief Complaint     Chief Complaint   Patient presents with    Knee Pain     L         History of Present Illness   Marley Redd presents to the clinic c/o    This is a 80-year-old male complaining of left knee pain x1 week  Patient reports today he went to grab something quickly and pivoted and now is having increased pain and unable to bear weight  Patient reports he injured his left knee about 2 years ago and reports it was a sprain strain  Patient reports taking ibuprofen with minimal relief  Patient denies any knee giving out sensation  Patient reports initially when the knee pain started about a week ago he noticed some black and blue and swelling of the lateral aspect of his knee  Patient denies any specific injury or trauma  Patient reports he has appointment with his orthopedic doctor tomorrow  Knee Pain          Review of Systems   Review of Systems   Constitutional: Negative for chills, fatigue and fever  HENT: Negative for congestion, ear pain, hearing loss, postnasal drip, sinus pain, sinus pressure and sore throat  Eyes: Negative for pain and discharge  Respiratory: Negative for chest tightness and shortness of breath  Cardiovascular: Negative for chest pain  Gastrointestinal: Negative for abdominal pain, constipation, nausea and vomiting  Genitourinary: Negative for difficulty urinating  Musculoskeletal: Positive for joint swelling  Negative for arthralgias and myalgias  Skin: Negative for rash  Neurological: Negative for dizziness and headaches  Psychiatric/Behavioral: Negative for behavioral problems  Current Medications       Current Outpatient Prescriptions:     Naproxen Sodium (ALEVE) 220 MG CAPS, Take by mouth, Disp: , Rfl:     Current Allergies     Allergies as of 02/19/2018    (No Known Allergies)            The following portions of the patient's history were reviewed and updated as appropriate: allergies, current medications, past family history, past medical history, past social history, past surgical history and problem list     Objective   /89 (Patient Position: Sitting)   Pulse 75   Resp 16   Ht 5' 11" (1 803 m)   Wt 86 2 kg (190 lb)   BMI 26 50 kg/m²     X-RAY    Left knee  I personally reviewed X-ray  No acute osseous abnormalities  Physical Exam     Physical Exam   Constitutional: He appears well-developed and well-nourished  Cardiovascular: Normal rate, regular rhythm and normal heart sounds  Pulmonary/Chest: Effort normal and breath sounds normal    Musculoskeletal:        Left knee: He exhibits swelling (Posterior aspect)  He exhibits normal range of motion, no effusion, no ecchymosis, no deformity, no laceration, no erythema, normal alignment, no LCL laxity, normal patellar mobility, no bony tenderness, normal meniscus and no MCL laxity  Tenderness (Posteriorly) found  Legs:  Negative Lachman's test  Negative anterior drawer    Neurological: He is alert  He has normal reflexes  Gait normal    Psychiatric: He has a normal mood and affect  Nursing note and vitals reviewed

## 2018-02-19 NOTE — PATIENT INSTRUCTIONS
Rice measures as directed  Take motrin as directed for pain  Follow with orthopedic doctor tomorrow  Follow up with PCP in 1-2 days  Go to the ER for worsening symptoms

## 2018-02-19 NOTE — TELEPHONE ENCOUNTER
THIS PATIENT INJURED HIS KNEE HE HAS AN APPOINTMENT WITH ORTHO TOMORROW AND WAS SEEN AT [facility] NOW FOR THE KNEE PAIN BUT IS HAVING TROUBLE WALKING AND WANTS TO KNOW IF YOU CAN WRITE A SCRIPT FOR A SET OF CRUTCHES TO HELP HIM FOR TOMORROW

## 2018-02-20 ENCOUNTER — OFFICE VISIT (OUTPATIENT)
Dept: OBGYN CLINIC | Facility: MEDICAL CENTER | Age: 68
End: 2018-02-20
Payer: COMMERCIAL

## 2018-02-20 VITALS
HEART RATE: 76 BPM | BODY MASS INDEX: 26.6 KG/M2 | SYSTOLIC BLOOD PRESSURE: 130 MMHG | WEIGHT: 190 LBS | DIASTOLIC BLOOD PRESSURE: 87 MMHG | HEIGHT: 71 IN

## 2018-02-20 DIAGNOSIS — S89.92XA LEFT KNEE INJURY, INITIAL ENCOUNTER: Primary | ICD-10-CM

## 2018-02-20 DIAGNOSIS — M25.462 EFFUSION OF LEFT KNEE: ICD-10-CM

## 2018-02-20 DIAGNOSIS — M25.562 ACUTE PAIN OF LEFT KNEE: ICD-10-CM

## 2018-02-20 PROCEDURE — 99214 OFFICE O/P EST MOD 30 MIN: CPT | Performed by: FAMILY MEDICINE

## 2018-02-20 RX ORDER — NAPROXEN 500 MG/1
500 TABLET ORAL
COMMUNITY
Start: 2014-04-29 | End: 2018-02-20 | Stop reason: SDUPTHER

## 2018-02-20 RX ORDER — LANOLIN ALCOHOL/MO/W.PET/CERES
CREAM (GRAM) TOPICAL
COMMUNITY
End: 2018-03-27

## 2018-02-20 NOTE — PROGRESS NOTES
1  Acute pain of left knee  XR knee 3 vw left non injury   2  Left knee injury, initial encounter     3  Effusion of left knee       No orders of the defined types were placed in this encounter  Imaging Studies (I personally reviewed results in PACS):  X-ray left knee 02/19/2018:  No acute osseous abnormality  IMPRESSION:  Left knee traumatic injury with traumatic effusion, instability, and locking  Partially ruptured Baker's cyst  History of partial PCL tear    Differential diagnosis:  Meniscal tear  Complete tear PCL    Return for Follow-up after MRI  Patient Instructions   Patient have MRI performed  Patient start hinged knee brace remain nonweightbearing  Instructed patient that if he has any calf pain or swelling developed that he has go the ER immediately for possible DVT emergency  Patient expressed understanding of the plan  CHIEF COMPLAINT:  Left knee pain    HPI:  Marley Redd is a 79 y o  male has past medical history significant for left partial tear of the PCL as well as moderate tricompartmental arthritis worse in the medial patellofemoral compartments  Today patient presents for evaluation of Left knee pain   X1 week after fall approximately 1 1/2 weeks ago  When slipping on the ice  He landed on his right lower back  Patient knows he had pain developed a few days later   In his left knee but denies any immediate pain after his fall  Veronica Ocotillo He points to   Posterior lateral aspect of his knee as source of pain  He also describes a bruising in his proximal lateral calf  He states that he did have a swelling as posterior knee that has been present for approximately 2 years  He states that his pain was tolerable up until yesterday when he twisted his knee when turning to reach  For something  He states at that time his knee did give out on him and he caught himself before falling to the ground     At that time he noticed severe pain developed   And worsen swelling diffuse  Today, patient states that he feels  50% improved  He still feels that he is unstable as left knee is reluctant to bear any weight on his left knee  Yesterday did present to the urgent care was given crutches well as had x-rays done which showed no acute osseous abnormality  But not give him any prescription medication  He has been taking Aleve over-the-counter twice a day  He has also been wearing compressive knee sleeve on left side does given to him by prior physician this office  Review of Systems   Constitutional: Negative for chills, fever and unexpected weight change  HENT: Negative for hearing loss, nosebleeds and sore throat  Eyes: Negative for pain, redness and visual disturbance  Respiratory: Negative for cough, shortness of breath and wheezing  Cardiovascular: Negative for chest pain, palpitations and leg swelling  Gastrointestinal: Negative for abdominal distention, nausea and vomiting  Endocrine: Negative for polydipsia and polyuria  Genitourinary: Negative for dysuria and hematuria  Skin: Negative for rash and wound  Neurological: Negative for dizziness, numbness and headaches  Psychiatric/Behavioral: Negative for decreased concentration and suicidal ideas  Following history reviewed and update:    History reviewed  No pertinent past medical history  History reviewed  No pertinent surgical history  Social History   History   Alcohol Use    Yes     History   Drug use: Unknown     History   Smoking Status    Never Smoker   Smokeless Tobacco    Never Used     Family History   Problem Relation Age of Onset    Heart disease Father      No Known Allergies       Physical Exam   Constitutional: He is oriented to person, place, and time  He appears well-developed and well-nourished  HENT:   Head: Normocephalic and atraumatic     Right Ear: External ear normal    Left Ear: External ear normal    Nose: Nose normal    Eyes: Conjunctivae are normal  Right eye exhibits no discharge  Left eye exhibits no discharge  No scleral icterus  Neck: Neck supple  Pulmonary/Chest: Effort normal  No respiratory distress  Neurological: He is alert and oriented to person, place, and time  Psychiatric: He has a normal mood and affect   His behavior is normal  Judgment and thought content normal        Ortho Exam    Left Knee  Erythema: no  Swelling:  3+ effusion diffuse; positive Baker's cyst  Increased Warmth: no  Tenderness:  None  ROM:  Positive flexion deformity -10; flexes to 110  Patellar Apprehension: No  Patellar Grind Field's: negative  Lachman's:  Difficult exam due to guarding  Drawer: negative  Varus laxity: negative  Valgus laxity: negative  Myesha:  Positive lateral joint line pain  Positive ecchymoses proximal lateral gastroc  No calf tenderness, no calf swelling; negative Homans sign      RIght Knee  Erythema: no  Swelling: no  Increased Warmth: no  ROM: full flexion and extension  Lachman's: negative  Drawer: negative  Procedures

## 2018-02-20 NOTE — PATIENT INSTRUCTIONS
Patient have MRI performed  Patient start hinged knee brace remain nonweightbearing  Instructed patient that if he has any calf pain or swelling developed that he has go the ER immediately for possible DVT emergency  Patient expressed understanding of the plan

## 2018-02-20 NOTE — LETTER
February 20, 2018     Patient: Rebekah Handley   YOB: 1950   Date of Visit: 2/20/2018       To Whom it May Concern:    Lyssa Red Willow is under my professional care  He was seen in my office on 2/20/2018       If you have any questions or concerns, please don't hesitate to call           Sincerely,          Marciano Pickard III, DO        CC: Rebekahjose raul Handley

## 2018-02-22 ENCOUNTER — TRANSCRIBE ORDERS (OUTPATIENT)
Dept: ADMINISTRATIVE | Age: 68
End: 2018-02-22

## 2018-02-22 ENCOUNTER — HOSPITAL ENCOUNTER (OUTPATIENT)
Dept: RADIOLOGY | Age: 68
Discharge: HOME | End: 2018-02-22
Payer: COMMERCIAL

## 2018-02-22 DIAGNOSIS — S89.92XA LEFT KNEE INJURY, INITIAL ENCOUNTER: ICD-10-CM

## 2018-02-22 DIAGNOSIS — M25.462 EFFUSION OF LEFT KNEE: ICD-10-CM

## 2018-02-22 PROCEDURE — 73721 MRI JNT OF LWR EXTRE W/O DYE: CPT

## 2018-02-27 ENCOUNTER — OFFICE VISIT (OUTPATIENT)
Dept: OBGYN CLINIC | Facility: MEDICAL CENTER | Age: 68
End: 2018-02-27
Payer: COMMERCIAL

## 2018-02-27 DIAGNOSIS — S83.242D ACUTE MEDIAL MENISCUS TEAR OF LEFT KNEE, SUBSEQUENT ENCOUNTER: Primary | ICD-10-CM

## 2018-02-27 PROCEDURE — 99214 OFFICE O/P EST MOD 30 MIN: CPT | Performed by: FAMILY MEDICINE

## 2018-02-27 RX ORDER — NAPROXEN 500 MG/1
500 TABLET ORAL 2 TIMES DAILY WITH MEALS
Qty: 40 TABLET | Refills: 0 | Status: SHIPPED | OUTPATIENT
Start: 2018-02-27 | End: 2018-03-16 | Stop reason: SDUPTHER

## 2018-02-27 NOTE — PATIENT INSTRUCTIONS
Patient to follow-up with surgeon to consider surgical intervention for new acute medial meniscus tear    educated risks of mixing nsaids (advil, ibuprofen, aleve, naproxen, aspirin, ibuprofen containing products) with each other or with steroids including severe internal bleeding and kidney failure  patient expressed understanding and agreed to plan

## 2018-02-27 NOTE — PROGRESS NOTES
1  Acute medial meniscus tear of left knee, subsequent encounter  naproxen (EC NAPROSYN) 500 MG EC tablet     No orders of the defined types were placed in this encounter  Imaging Studies (I personally reviewed results in PACS):  MRI left knee 02/22/2018: There is a meniscal tear posterior horn of medial meniscus visible multiple cuts and extending into the body  Chronic partial tear PCL on insertion into femur  IMPRESSION:  Acute cleavage tear of posterior horn of medial meniscus  History of chronic partial PCL tear  Date of injury:  Early February 2018  Follow-up interval:  2-3 weeks    Return for Follow up with surgeon Dr Brandee Gee  Patient Instructions   Patient to follow-up with surgeon to consider surgical intervention for new acute medial meniscus tear    educated risks of mixing nsaids (advil, ibuprofen, aleve, naproxen, aspirin, ibuprofen containing products) with each other or with steroids including severe internal bleeding and kidney failure  patient expressed understanding and agreed to plan  CHIEF COMPLAINT:  Left knee pain    HPI:  Bert Overton is a 79 y o  male has past medical history significant for left partial tear of the PCL as well as moderate tricompartmental arthritis worse in the medial patellofemoral compartments  Last visit patient had evaluation for acute left knee injury with swelling  MRI was ordered  Today, patient states he feels 95% improved  He has been compliant with his knee brace  Review of Systems   Constitutional: Negative for fever  Neurological: Negative for numbness  Denies calf swelling or calf pain      Following history reviewed and update:    No past medical history on file  No past surgical history on file    Social History   History   Alcohol Use    Yes     History   Drug use: Unknown     History   Smoking Status    Never Smoker   Smokeless Tobacco    Never Used     Family History   Problem Relation Age of Onset    Heart disease Father      No Known Allergies       Physical Exam   Constitutional: He is oriented to person, place, and time  He appears well-developed and well-nourished  HENT:   Head: Normocephalic and atraumatic  Right Ear: External ear normal    Left Ear: External ear normal    Nose: Nose normal    Eyes: Conjunctivae are normal  Right eye exhibits no discharge  Left eye exhibits no discharge  No scleral icterus  Neck: Neck supple  Pulmonary/Chest: Effort normal  No respiratory distress  Neurological: He is alert and oriented to person, place, and time  Psychiatric: He has a normal mood and affect  His behavior is normal  Judgment and thought content normal        Ortho Exam    Left Knee  Erythema: no  Swellin+ effusion  Increased Warmth: no  Tenderness:  None  ROM:  Positive flexion deformity -10; flexes to 110  Patellar Apprehension: No  Patellar Grind Field's: negative  Lachman's:  Negative  Drawer: negative  Varus laxity: negative  Valgus laxity: negative  [facility]:  Positive lateral joint line pain no crepitus; no medial joint line pain  Positive ecchymoses proximal lateral gastroc  No calf tenderness, no calf swelling; negative Homans sign  Dial test no pain and symmetric    Procedures      Total visit time was 25 minutes of which more than 50% was face to face counseling and/or coordination of care with patient regarding their treatment plan as outlined in note

## 2018-03-08 DIAGNOSIS — Z11.59 ENCOUNTER FOR HEPATITIS C SCREENING TEST FOR LOW RISK PATIENT: Primary | ICD-10-CM

## 2018-03-16 ENCOUNTER — OFFICE VISIT (OUTPATIENT)
Dept: OBGYN CLINIC | Facility: MEDICAL CENTER | Age: 68
End: 2018-03-16
Payer: COMMERCIAL

## 2018-03-16 VITALS — SYSTOLIC BLOOD PRESSURE: 134 MMHG | HEIGHT: 71 IN | HEART RATE: 92 BPM | DIASTOLIC BLOOD PRESSURE: 86 MMHG

## 2018-03-16 DIAGNOSIS — M25.562 CHRONIC PAIN OF LEFT KNEE: Primary | ICD-10-CM

## 2018-03-16 DIAGNOSIS — G89.29 CHRONIC PAIN OF LEFT KNEE: Primary | ICD-10-CM

## 2018-03-16 DIAGNOSIS — S83.242D ACUTE MEDIAL MENISCUS TEAR OF LEFT KNEE, SUBSEQUENT ENCOUNTER: ICD-10-CM

## 2018-03-16 PROCEDURE — 99214 OFFICE O/P EST MOD 30 MIN: CPT | Performed by: ORTHOPAEDIC SURGERY

## 2018-03-16 PROCEDURE — 20610 DRAIN/INJ JOINT/BURSA W/O US: CPT | Performed by: ORTHOPAEDIC SURGERY

## 2018-03-16 RX ORDER — NAPROXEN 500 MG/1
500 TABLET ORAL 2 TIMES DAILY WITH MEALS
Qty: 40 TABLET | Refills: 0 | Status: SHIPPED | OUTPATIENT
Start: 2018-03-16 | End: 2018-07-11 | Stop reason: SDUPTHER

## 2018-03-16 RX ADMIN — BUPIVACAINE HYDROCHLORIDE 4 ML: 2.5 INJECTION, SOLUTION INFILTRATION; PERINEURAL at 14:35

## 2018-03-16 RX ADMIN — METHYLPREDNISOLONE ACETATE 1 ML: 40 INJECTION, SUSPENSION INTRA-ARTICULAR; INTRALESIONAL; INTRAMUSCULAR; SOFT TISSUE at 14:35

## 2018-03-16 NOTE — PROGRESS NOTES
Orthopaedic Surgery - Office Note  Marley Redd (52 y o  male)   : 1950   MRN: [id number]  Encounter Date: 3/16/2018    Chief Complaint   Patient presents with    Left Knee - Pain       Assessment / Plan  Left knee osteoarthritis with degenerative medial meniscus tear and chronic PCL injury    · CSI of left knee joint was performed  · Activity as tolerated  · Begin outpatient PT for hip and thigh strengthening  · Anti-inflammatories or Tylenol prn pain  · Return in about 3 months (around 2018)  History of Present Illness  Marley Redd is a 79 y o  male who presents for evaluation of left acute on chronic knee pain  Initial injury was in 2016  He had mild knee pain since then  He had increased pain after a low-energy twisting injury on 18  He has complained of medial knee pain and swelling with locking since then  Pain has gradually improved over the past 4 weeks  Denies instability or numbness in theLLE  Review of Systems  Pertinent items are noted in HPI  All other systems were reviewed and are negative  Physical Exam  /86   Pulse 92   Ht 5' 11" (1 803 m)   Cons: Appears well  No apparent distress  Psych: Alert  Oriented x3  Mood and affect normal   Eyes: PERRLA, EOMI  Resp: Normal effort  No audible wheezing or stridor  CV: Palpable pulse  No discernable arrhythmia  No LE edema  Lymph:  No palpable cervical, axillary, or inguinal lymphadenopathy  Skin: Warm  No palpable masses  No visible lesions  Neuro: Normal muscle tone  Normal and symmetric DTR's  Left Knee Exam  Alignment:  mild genu varus  Inspection:  moderate swelling  No deformity  Palpation:  medial joint line tenderness  ROM:  Knee Extension 0  Knee Flexion 125  Strength:  5/5 quadriceps and hamstrings  Stability:  No objective knee instability  Stable Varus / Valgus stress, Lachman, and Posterior drawer  Tests:  (+) Addie    Patella:  Patella tracks centrally with crepitus  Neurovascular:  Sensation intact in DP/SP/Luevano/Sa/T nerve distributions  2+ DP & PT pulses  Gait:  Heel-to-toe  Studies Reviewed  I have personally reviewed pertinent films in PACS  XR of left knee - moderate degenerative changes with medial joint space narrowing  MRI of left knee - severe medial compartment chondral loss with large degenerative medial meniscus tear    Large joint arthrocentesis  Date/Time: 3/16/2018 2:35 PM  Consent given by: patient  Supporting Documentation  Indications: pain   Procedure Details  Location: knee - L knee  Preparation: Alcohol  Needle size: 22 G  Approach: superolateral   Medications administered: 4 mL bupivacaine 0 25 %; 1 mL methylPREDNISolone acetate 40 mg/mL    Patient tolerance: patient tolerated the procedure well with no immediate complications  Dressing:  Sterile dressing applied           Medical, Surgical, Family, and Social History  The patient's medical history, family history, and social history, were reviewed and updated as appropriate  History reviewed  No pertinent past medical history  History reviewed  No pertinent surgical history  Family History   Problem Relation Age of Onset    Heart disease Father        Social History     Occupational History    Not on file       Social History Main Topics    Smoking status: Never Smoker    Smokeless tobacco: Never Used    Alcohol use Yes    Drug use: Unknown    Sexual activity: Not on file       No Known Allergies      Current Outpatient Prescriptions:     naproxen (EC NAPROSYN) 500 MG EC tablet, Take 1 tablet (500 mg total) by mouth 2 (two) times a day with meals, Disp: 40 tablet, Rfl: 0    glucosamine-chondroitin 500-400 MG tablet, Take by mouth, Disp: , Rfl:     Naproxen Sodium (ALEVE) 220 MG CAPS, Take by mouth, Disp: , Rfl:       Tez Malagon MD    Scribe Attestation    I,:    am acting as a scribe while in the presence of the attending physician :        I,:    personally performed the services described in this documentation    as scribed in my presence :

## 2018-03-20 RX ORDER — BUPIVACAINE HYDROCHLORIDE 2.5 MG/ML
4 INJECTION, SOLUTION INFILTRATION; PERINEURAL
Status: COMPLETED | OUTPATIENT
Start: 2018-03-16 | End: 2018-03-16

## 2018-03-20 RX ORDER — METHYLPREDNISOLONE ACETATE 40 MG/ML
1 INJECTION, SUSPENSION INTRA-ARTICULAR; INTRALESIONAL; INTRAMUSCULAR; SOFT TISSUE
Status: COMPLETED | OUTPATIENT
Start: 2018-03-16 | End: 2018-03-16

## 2018-03-27 ENCOUNTER — OFFICE VISIT (OUTPATIENT)
Dept: OBGYN CLINIC | Facility: MEDICAL CENTER | Age: 68
End: 2018-03-27
Payer: COMMERCIAL

## 2018-03-27 ENCOUNTER — EVALUATION (OUTPATIENT)
Dept: PHYSICAL THERAPY | Facility: MEDICAL CENTER | Age: 68
End: 2018-03-27
Payer: COMMERCIAL

## 2018-03-27 VITALS
HEIGHT: 71 IN | HEART RATE: 78 BPM | SYSTOLIC BLOOD PRESSURE: 142 MMHG | WEIGHT: 190 LBS | BODY MASS INDEX: 26.6 KG/M2 | DIASTOLIC BLOOD PRESSURE: 86 MMHG

## 2018-03-27 DIAGNOSIS — G89.29 CHRONIC PAIN OF LEFT KNEE: Primary | ICD-10-CM

## 2018-03-27 DIAGNOSIS — M25.562 CHRONIC PAIN OF LEFT KNEE: Primary | ICD-10-CM

## 2018-03-27 DIAGNOSIS — M19.011 GLENOHUMERAL ARTHRITIS, RIGHT: Primary | ICD-10-CM

## 2018-03-27 PROCEDURE — G8978 MOBILITY CURRENT STATUS: HCPCS | Performed by: PHYSICAL THERAPIST

## 2018-03-27 PROCEDURE — 97161 PT EVAL LOW COMPLEX 20 MIN: CPT | Performed by: PHYSICAL THERAPIST

## 2018-03-27 PROCEDURE — G8979 MOBILITY GOAL STATUS: HCPCS | Performed by: PHYSICAL THERAPIST

## 2018-03-27 PROCEDURE — 97110 THERAPEUTIC EXERCISES: CPT | Performed by: PHYSICAL THERAPIST

## 2018-03-27 PROCEDURE — 20610 DRAIN/INJ JOINT/BURSA W/O US: CPT | Performed by: ORTHOPAEDIC SURGERY

## 2018-03-27 PROCEDURE — 99214 OFFICE O/P EST MOD 30 MIN: CPT | Performed by: ORTHOPAEDIC SURGERY

## 2018-03-27 RX ORDER — TRIAMCINOLONE ACETONIDE 40 MG/ML
40 INJECTION, SUSPENSION INTRA-ARTICULAR; INTRAMUSCULAR
Status: COMPLETED | OUTPATIENT
Start: 2018-03-27 | End: 2018-03-27

## 2018-03-27 RX ORDER — BUPIVACAINE HYDROCHLORIDE 2.5 MG/ML
4 INJECTION, SOLUTION INFILTRATION; PERINEURAL
Status: COMPLETED | OUTPATIENT
Start: 2018-03-27 | End: 2018-03-27

## 2018-03-27 RX ORDER — LIDOCAINE HYDROCHLORIDE 10 MG/ML
4 INJECTION, SOLUTION INFILTRATION; PERINEURAL
Status: COMPLETED | OUTPATIENT
Start: 2018-03-27 | End: 2018-03-27

## 2018-03-27 RX ADMIN — LIDOCAINE HYDROCHLORIDE 4 ML: 10 INJECTION, SOLUTION INFILTRATION; PERINEURAL at 11:22

## 2018-03-27 RX ADMIN — TRIAMCINOLONE ACETONIDE 40 MG: 40 INJECTION, SUSPENSION INTRA-ARTICULAR; INTRAMUSCULAR at 11:22

## 2018-03-27 RX ADMIN — BUPIVACAINE HYDROCHLORIDE 4 ML: 2.5 INJECTION, SOLUTION INFILTRATION; PERINEURAL at 11:22

## 2018-03-27 NOTE — PROGRESS NOTES
PT Evaluation     Today's date: 3/27/2018  Patient name: Patsy Willis  : 1950  MRN: [id number]  Referring provider: Bhavana Jefferson MD  Dx:   Encounter Diagnosis     ICD-10-CM    1  Chronic pain of left knee M25 562     G89 29                   Assessment  Impairments: abnormal or restricted ROM, impaired physical strength, lacks appropriate home exercise program and pain with function    Assessment details: Patsy Willis is a 79 y o  male who presents with Chronic pain of left knee  (primary encounter diagnosis)  Patient presents alert and oriented with the above impairments  Kai Elliott will benefit from PT to addres deficits in order to maximize and return to prior level of function  No further referral appears necessary at this time based upon examination results  Prognosis is good given HEP compliance  Please contact me if you have any questions or recommendations  He did inquire about wearing orthotics; advised to contact podiatrist for further evaluation and determination  Understanding of Dx/Px/POC: good   Prognosis: good    Goals  Short Term Goals:   1  Pain decreased 2 ratings in 4 weeks  2  ROM increased 5* in 4 weeks  3  Strength increased 1/2 grade in 4 weeks    Long Term Goals:   1  ADLS/IADLS in related activities improved to maximal level in 8 weeks  2  Work performance improved to maximal level in 8 weeks  3  Ambulation and stair climbing are improved to maximal level in 8 weeks  4   [place] with HEP in 8 weeks  Subjective Evaluation    History of Present Illness  Mechanism of injury: Pt reports ongoing L knee pain due to arthritis  In  he fell backwards and caught his foot which increased his knee pain  He saw orthopaedic at that time and was provided w/ brace  Pain did resolve over time, however, knee flexion remained limited  This past February he made a sudden turn and felt a snap in his L knee    He went to urgent care that day and the following day saw orthopaedic  He was sent for MRI which revealed meniscus tear  He then began seeing Dr Shahida Lynn and was told he is not a surgical candidate  He was given injection which may have provided some relief  He has been provided w/ 2 different knee braces w/ lateral supports which have not provided much relief  He was also referred to physical therapy  He reports that pain is intermittent in nature  Pain increases w/ walking, stairs (descending worse than ascending)  He denies sleep disturbance and denies pain at rest   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, which he has been able to avoid since onset of pain  He feels pain has decreased the past 2 days  He denies buckling or locking  Pain  At best pain ratin  At worst pain ratin    Patient Goals  Patient goals for therapy: decreased pain, increased motion, increased strength and independence with ADLs/IADLs          Objective     Observations     Additional Observation Details  L knee lateral skin irritation which he reports is due to his Breg knee brace; advised to hold on wearing to avoid further skin breakdown  No skin irritation w/ [place] brace    Palpation     Additional Palpation Details  No tenderness to palpation reported today  Active Range of Motion   Left Knee   Flexion: 130 degrees   Extension: 0 degrees     Right Knee   Flexion: 135 degrees   Extension: 0 degrees     Additional Active Range of Motion Details  Tightness noted in L hamstring and gastroc      Strength/Myotome Testing     Left Hip   Planes of Motion   Flexion: 4+  Extension: 5  Abduction: 4+    Right Hip   Planes of Motion   Flexion: 5  Extension: 5  Abduction: 5    Left Knee   Prone flexion: 4+  Extension: 5    Right Knee   Prone flexion: 5  Extension: 5    Ambulation     Comments   Slight IR noted in LLE      Flowsheet Rows    Flowsheet Row Most Recent Value   PT/OT G-Codes   Current Score  45   Projected Score  63   FOTO information reviewed  Yes   Assessment Type  Evaluation   G code set  Mobility: Walking & Moving Around   Mobility: Walking and Moving Around Current Status ()  CK   Mobility: Walking and Moving Around Goal Status ()  CJ          Precautions: none    Daily Treatment Diary     Manual                                                                                   Exercise Diary  3/27            Quad sets 5"x20            Hamstring stretch 30"x3            gastroc towel stretch 30"x3            Recumbent bike nv            SLR x4 nv            Mini squats nv            HR nv            Step ups nv                                                                                                                                                                            Modalities

## 2018-03-27 NOTE — PROGRESS NOTES
CHIEF COMPLAINT:   Chief Complaint   Patient presents with    Right Shoulder - Follow-up       HISTORY: Magdalena Martin is a 79 y o  male here for follow up of right shoulder pain  Had excellent relief from cortisone injection done 3 5 months ago  Started to have recurrent activity-related pain     ROS: Other than what is noted in the history of present illness 12 point review of systems was obtained and was otherwise negative      PROBLEMS:   Patient Active Problem List   Diagnosis    Pain in left knee    Glenohumeral arthritis, right       MEDS:   Current Outpatient Prescriptions   Medication Sig Dispense Refill    naproxen (EC NAPROSYN) 500 MG EC tablet Take 1 tablet (500 mg total) by mouth 2 (two) times a day with meals 40 tablet 0     No current facility-administered medications for this visit  ALLERGIES: Patient has no known allergies  MEDICAL HISTORY:   No past medical history on file  SOCIAL:   Social History     Social History    Marital status: /Civil Union     Spouse name: N/A    Number of children: N/A    Years of education: N/A     Occupational History    Not on file  Social History Main Topics    Smoking status: Never Smoker    Smokeless tobacco: Never Used    Alcohol use Yes    Drug use: Unknown    Sexual activity: Not on file     Other Topics Concern    Not on file     Social History Narrative    No narrative on file       The medications, allergies, and history as listed above were all reviewed by myself during this encounter      PHYSICAL EXAM:  Vitals:   Vitals:    03/27/18 1052   BP: 142/86   Pulse: 78   Weight: 86 2 kg (190 lb)   Height: 5' 11" (1 803 m)     Body mass index is 26 5 kg/m²  General:  Alert, no distress    ORTHO EXAM:   Right shoulder: Skin intact  No tenderness  Active forward elevation is 160, external rotation 45  Strength 5/5 in elevation, internal and external rotation    Sensation intact to light touch throughout       ASSESSMENT AND PLAN:  Martínez Wu was seen today for follow-up  Diagnoses and all orders for this visit:    Glenohumeral arthritis, right    We discussed options for treatment  He would like to continue with conservative management at this time  He elects for repeat cortisone injection today  There are no Patient Instructions on file for this visit  No Follow-up on file      Large joint arthrocentesis  Date/Time: 3/27/2018 11:22 AM  Consent given by: patient  Site marked: site marked  Timeout: Immediately prior to procedure a time out was called to verify the correct patient, procedure, equipment, support staff and site/side marked as required   Supporting Documentation  Indications: pain   Procedure Details  Location: shoulder - R glenohumeral  Preparation: Patient was prepped and draped in the usual sterile fashion  Needle size: 22 G  Ultrasound guidance: no  Approach: anterior  Medications administered: 4 mL bupivacaine 0 25 %; 4 mL lidocaine 1 %; 40 mg triamcinolone acetonide 40 mg/mL    Patient tolerance: patient tolerated the procedure well with no immediate complications  Dressing:  Sterile dressing applied

## 2018-04-02 ENCOUNTER — OFFICE VISIT (OUTPATIENT)
Dept: PHYSICAL THERAPY | Facility: MEDICAL CENTER | Age: 68
End: 2018-04-02
Payer: COMMERCIAL

## 2018-04-02 DIAGNOSIS — G89.29 CHRONIC PAIN OF LEFT KNEE: Primary | ICD-10-CM

## 2018-04-02 DIAGNOSIS — M25.562 CHRONIC PAIN OF LEFT KNEE: Primary | ICD-10-CM

## 2018-04-02 PROCEDURE — 97110 THERAPEUTIC EXERCISES: CPT | Performed by: PHYSICAL THERAPIST

## 2018-04-02 PROCEDURE — 97112 NEUROMUSCULAR REEDUCATION: CPT | Performed by: PHYSICAL THERAPIST

## 2018-04-02 NOTE — PROGRESS NOTES
Daily Note     Today's date: 2018  Patient name: Latonya Marti  : 1950  MRN: [id number]  Referring provider: Herb Craven MD  Dx:   Encounter Diagnosis     ICD-10-CM    1  Chronic pain of left knee M25 562     G89 29                   Subjective: Pt reports having about 4-5 good days but "today's not one of them"  Took medication prior to visit today  Objective: See treatment diary below  Precautions none    Specialty Daily Treatment Diary     Manual                                                     Exercise Diary  /       Recumbent bike 10'       Quad sets 5"x20       Hamstring stretch 30"x3       gastroc towel stretch 30"x3       SLR x 4 x20       HR x20       Mini squats x20       Step ups 6 in x20       Lateral step ups 6in x20                                                                                                   Modalities                                        Assessment: Tolerated treatment well  Patient would benefit from continued PT   Required cueing for technique w/ mini squats  Plan: Continue per plan of care

## 2018-04-04 ENCOUNTER — OFFICE VISIT (OUTPATIENT)
Dept: PHYSICAL THERAPY | Facility: MEDICAL CENTER | Age: 68
End: 2018-04-04
Payer: COMMERCIAL

## 2018-04-04 DIAGNOSIS — M25.562 CHRONIC PAIN OF LEFT KNEE: Primary | ICD-10-CM

## 2018-04-04 DIAGNOSIS — G89.29 CHRONIC PAIN OF LEFT KNEE: Primary | ICD-10-CM

## 2018-04-04 PROCEDURE — 97110 THERAPEUTIC EXERCISES: CPT

## 2018-04-04 PROCEDURE — 97112 NEUROMUSCULAR REEDUCATION: CPT

## 2018-04-04 NOTE — PROGRESS NOTES
Daily Note     Today's date: 2018  Patient name: Caren Chun  : 1950  MRN: [id number]  Referring provider: Hector Villanueva MD  Dx:   Encounter Diagnosis     ICD-10-CM    1  Chronic pain of left knee M25 562     G89 29                   Subjective: Pt reports that his knee is getting better everyday  Notes that he has no further questions about his HEP  Objective: See treatment diary below  Precautions none    Specialty Daily Treatment Diary     Manual                                                     Exercise Diary        Recumbent bike 10' 10'      Quad sets 5"x20 5"x20      Hamstring stretch 30"x3 30"x3      gastroc towel stretch 30"x3 30"x3      SLR x 4 x20 x20      HR x20 x25      Mini squats x20 x25      Step ups 6 in x20 6" x25      Lateral step ups 6in x20 6" x25                                                                                                  Modalities                                        Assessment: Tolerated treatment well  Patient would benefit from continued PT   Pt did have a moderate L quad contraction but is still lacking compared to the R quad  Needed Vcing for proper squatting technique which was then corrected  Plan: Continue per plan of care

## 2018-04-09 ENCOUNTER — OFFICE VISIT (OUTPATIENT)
Dept: PHYSICAL THERAPY | Facility: MEDICAL CENTER | Age: 68
End: 2018-04-09
Payer: COMMERCIAL

## 2018-04-09 DIAGNOSIS — M25.562 CHRONIC PAIN OF LEFT KNEE: Primary | ICD-10-CM

## 2018-04-09 DIAGNOSIS — G89.29 CHRONIC PAIN OF LEFT KNEE: Primary | ICD-10-CM

## 2018-04-09 PROCEDURE — 97112 NEUROMUSCULAR REEDUCATION: CPT

## 2018-04-09 PROCEDURE — 97110 THERAPEUTIC EXERCISES: CPT

## 2018-04-09 NOTE — PROGRESS NOTES
Daily Note     Today's date: 2018  Patient name: Joe Robles  : 1950  MRN: [id number]  Referring provider: Zeus Jasmine MD  Dx:   Encounter Diagnosis     ICD-10-CM    1  Chronic pain of left knee M25 562     G89 29                   Subjective: Pt reports that he currently is doing well with his knee  Notes that over the weekend, one day it was stiff feeling but the next day his knee was fine  Objective: See treatment diary below  Precautions none    Specialty Daily Treatment Diary     Manual                                                     Exercise Diary       Recumbent bike 10' 10' 10'     Quad sets 5"x20 5"x20 5"x20     Hamstring stretch 30"x3 30"x3 30"x3     gastroc towel stretch 30"x3 30"x3 30"x3     SLR x 4 x20 x20 x25     HR x20 x25 x25     Mini squats x20 x25 x25     Step ups 6 in x20 6" x25 6"x25     Lateral step ups 6in x20 6" x25 6"x25     Side stepping   GTB 5 laps     Lat Step downs   6' 2x10                                                                                 Modalities                                        Assessment: Tolerated treatment well  Patient would benefit from continued PT   Pt did have a moderate L quad contraction but is still lacking compared to the R quad  Progressed exercises as above with good tolerance  Plan: Continue per plan of care

## 2018-04-11 ENCOUNTER — APPOINTMENT (OUTPATIENT)
Dept: PHYSICAL THERAPY | Facility: MEDICAL CENTER | Age: 68
End: 2018-04-11
Payer: COMMERCIAL

## 2018-04-16 ENCOUNTER — OFFICE VISIT (OUTPATIENT)
Dept: PHYSICAL THERAPY | Facility: MEDICAL CENTER | Age: 68
End: 2018-04-16
Payer: COMMERCIAL

## 2018-04-16 DIAGNOSIS — M25.562 CHRONIC PAIN OF LEFT KNEE: Primary | ICD-10-CM

## 2018-04-16 DIAGNOSIS — G89.29 CHRONIC PAIN OF LEFT KNEE: Primary | ICD-10-CM

## 2018-04-16 PROCEDURE — 97110 THERAPEUTIC EXERCISES: CPT | Performed by: PHYSICAL THERAPIST

## 2018-04-16 PROCEDURE — 97112 NEUROMUSCULAR REEDUCATION: CPT | Performed by: PHYSICAL THERAPIST

## 2018-04-16 NOTE — PROGRESS NOTES
Daily Note     Today's date: 2018  Patient name: Brenda Mesa  : 1950  MRN: [id number]  Referring provider: Jax Eddy MD  Dx:   Encounter Diagnosis     ICD-10-CM    1  Chronic pain of left knee M25 562     G89 29                   Subjective: Pt reports that pain is minimal at most times  He does c/o soreness as well as antalgic gait following increased activity such as working in the yard  Objective: See treatment diary below  Precautions none    Specialty Daily Treatment Diary     Manual                                                     Exercise Diary      Recumbent bike 10' 10' 10' 10'    Quad sets 5"x20 5"x20 5"x20 5"x20    Hamstring stretch 30"x3 30"x3 30"x3 30"x3    gastroc towel stretch 30"x3 30"x3 30"x3 30"x3    SLR x 4 x20 x20 x25 2# x20    HR x20 x25 x25 x25    Mini squats x20 x25 x25 x25    Step ups 6 in x20 6" x25 6"x25 6" x25    Lateral step ups 6in x20 6" x25 6"x25 6" x25    Side stepping   GTB 5 laps GTB 5 laps    Lat Step downs   6' 2x10 6" x25    SLS    20" x 5                                                                        Modalities                                        Assessment: Tolerated treatment well  Patient would benefit from continued PT   Continues to tolerate progressions well  Issued GTB for home use w/ side stepping  No pain reported throughout treatment or at completion  Plan: Continue per plan of care

## 2018-04-18 ENCOUNTER — OFFICE VISIT (OUTPATIENT)
Dept: PHYSICAL THERAPY | Facility: MEDICAL CENTER | Age: 68
End: 2018-04-18
Payer: COMMERCIAL

## 2018-04-18 DIAGNOSIS — M25.562 CHRONIC PAIN OF LEFT KNEE: Primary | ICD-10-CM

## 2018-04-18 DIAGNOSIS — G89.29 CHRONIC PAIN OF LEFT KNEE: Primary | ICD-10-CM

## 2018-04-18 PROCEDURE — 97112 NEUROMUSCULAR REEDUCATION: CPT

## 2018-04-18 PROCEDURE — 97110 THERAPEUTIC EXERCISES: CPT

## 2018-04-18 NOTE — PROGRESS NOTES
Daily Note     Today's date: 2018  Patient name: Enoch Almodovar  : 1950  MRN: [id number]  Referring provider: Елена Miranda MD  Dx:   Encounter Diagnosis     ICD-10-CM    1  Chronic pain of left knee M25 562     G89 29                   Subjective: Pt reports that he is feeling really good after adding ankle weights to last session  Objective: See treatment diary below  Precautions none    Specialty Daily Treatment Diary     Manual                                                     Exercise Diary     Recumbent bike 10' 10' 10' 10' 10'   Quad sets 5"x20 5"x20 5"x20 5"x20 5"x20   Hamstring stretch 30"x3 30"x3 30"x3 30"x3 30"x3   gastroc towel stretch 30"x3 30"x3 30"x3 30"x3 30"x3   SLR x 4 x20 x20 x25 2# x20 2# x20   HR x20 x25 x25 x25 x25   Mini squats x20 x25 x25 x25 x25   Step ups 6 in x20 6" x25 6"x25 6" x25 6" x25   Lateral step ups 6in x20 6" x25 6"x25 6" x25 6" x25   Side stepping   GTB 5 laps GTB 5 laps GTB 5 laps   Lat Step downs   6' 2x10 6" x25 6"x25   SLS    20" x 5 Foam 20" x5                                                                       Modalities                                        Assessment: Tolerated treatment well  Patient would benefit from continued PT   Continues to tolerate progressions well  Progressed SLS with foam today  No pain reported throughout treatment or at completion, only fatigue  Plan: Continue per plan of care

## 2018-04-23 ENCOUNTER — OFFICE VISIT (OUTPATIENT)
Dept: PHYSICAL THERAPY | Facility: MEDICAL CENTER | Age: 68
End: 2018-04-23
Payer: COMMERCIAL

## 2018-04-23 DIAGNOSIS — M25.562 CHRONIC PAIN OF LEFT KNEE: Primary | ICD-10-CM

## 2018-04-23 DIAGNOSIS — G89.29 CHRONIC PAIN OF LEFT KNEE: Primary | ICD-10-CM

## 2018-04-23 PROCEDURE — 97110 THERAPEUTIC EXERCISES: CPT

## 2018-04-23 PROCEDURE — 97112 NEUROMUSCULAR REEDUCATION: CPT

## 2018-04-23 NOTE — PROGRESS NOTES
Daily Note     Today's date: 2018  Patient name: Mc Lyon  : 1950  MRN: [id number]  Referring provider: Andrea Vasquez MD  Dx:   Encounter Diagnosis     ICD-10-CM    1  Chronic pain of left knee M25 562     G89 29                   Subjective: Pt reports that he is feeling really good after adding ankle weights to last session  Objective: See treatment diary below  Precautions none    Specialty Daily Treatment Diary     Manual                                                     Exercise Diary     Recumbent bike 10' 10' 10' 10' 10'   Quad sets 5"x20 5"x20 5"x20 5"x20 5"x20   Hamstring stretch 30"x3 30"x3 30"x3 30"x3 30"x3   gastroc towel stretch 30"x3 30"x3 30"x3 30"x3 30"x3   SLR x 4  2# x20 x20 x25 2# x20 2# x20   HR x25 x25 x25 x25 x25   Mini squats x25 x25 x25 x25 x25   Step ups 6 in x25 6" x25 6"x25 6" x25 6" x25   Lateral step ups 6in x25 6" x25 6"x25 6" x25 6" x25   Side stepping GTB 5 laps  GTB 5 laps GTB 5 laps GTB 5 laps   Lat Step downs 6"x25  6' 2x10 6" x25 6"x25   SLS Foam 30"x3   20" x 5 Foam 20" x5                                                                       Modalities                                        Assessment: Tolerated treatment well  Patient would benefit from continued PT   Continued with all TE today demonstrated a good tolerance with each  No pain reported throughout treatment or at completion, only fatigue  Plan: Continue per plan of care

## 2018-04-26 ENCOUNTER — EVALUATION (OUTPATIENT)
Dept: PHYSICAL THERAPY | Facility: MEDICAL CENTER | Age: 68
End: 2018-04-26
Payer: COMMERCIAL

## 2018-04-26 DIAGNOSIS — M25.562 CHRONIC PAIN OF LEFT KNEE: Primary | ICD-10-CM

## 2018-04-26 DIAGNOSIS — G89.29 CHRONIC PAIN OF LEFT KNEE: Primary | ICD-10-CM

## 2018-04-26 PROCEDURE — G8978 MOBILITY CURRENT STATUS: HCPCS | Performed by: PHYSICAL THERAPIST

## 2018-04-26 PROCEDURE — G8979 MOBILITY GOAL STATUS: HCPCS | Performed by: PHYSICAL THERAPIST

## 2018-04-26 PROCEDURE — 97112 NEUROMUSCULAR REEDUCATION: CPT | Performed by: PHYSICAL THERAPIST

## 2018-04-26 PROCEDURE — 97110 THERAPEUTIC EXERCISES: CPT | Performed by: PHYSICAL THERAPIST

## 2018-04-26 NOTE — PROGRESS NOTES
PT Re-Evaluation and Discharge    Today's date: 2018  Patient name: Lia Driscoll  : 1950  MRN: [id number]  Referring provider: Kobe Cox MD  Dx:   Encounter Diagnosis     ICD-10-CM    1  Chronic pain of left knee M25 562     G89 29                   Assessment  Impairments: pain with function    Assessment details: Lia Driscoll has attended  8  visits since initiating PT and has demonstrated overall improvement  Mobility and strength has improved with decreased reports of pain  Lia Driscoll has demonstrated an improvement in function as well  Currently, he has made steady progress towards his goals, but continue to remain limited with prolonged walking  He has f/u w/ MD scheduled for next week; May continue independently w/ HEP  Pt does report excellent compliance w/ HEP and demonstrates good technique throughout treatment  Understanding of Dx/Px/POC: good   Prognosis: good    Goals  Short Term Goals:   1  Pain decreased 2 ratings in 4 weeks - PARTIALLY MET  2   ROM increased 5* in 4 weeks - MET  3  Strength increased 1/2 grade in 4 weeks - MET    Long Term Goals:   1  ADLS/IADLS in related activities improved to maximal level in 8 weeks -  MET  2  Work performance improved to maximal level in 8 weeks - MET  3  Ambulation and stair climbing are improved to maximal level in 8 weeks  - PARTIALLY MET  4  Pierce with HEP in 8 weeks  - MET    Plan  Treatment plan discussed with: patient  Plan details: Consider d/c to independent HEP; Will progress w/ MD orders  Subjective Evaluation    History of Present Illness  Mechanism of injury: Pt reports some improvement since beginning physical therapy  Most pain persists w/ prolonged walking more than 1/2 mile  Less pain and difficulty w/ stairs  No sleep disturbance reported  He does remain independent w/ daily tasks despite pain    Pain  At best pain ratin  At worst pain ratin    Patient Goals  Patient goals for therapy: decreased pain and independence with ADLs/IADLs          Objective     Active Range of Motion   Left Knee   Flexion: 133 degrees   Extension: 0 degrees     Strength/Myotome Testing     Left Hip   Planes of Motion   Flexion: 5  Extension: 5  Abduction: 5    Left Knee   Prone flexion: 5  Extension: 5      Flowsheet Rows      Most Recent Value   PT/OT G-Codes   Current Score  51   Projected Score  63   FOTO information reviewed  Yes   Assessment Type  Re-evaluation   G code set  Mobility: Walking & Moving Around   Mobility: Walking and Moving Around Current Status ()  CK   Mobility: Walking and Moving Around Goal Status ()  CJ          Precautions: none    Daily Treatment Diary     Manual                                                                                   Exercise Diary  4/26            Recumbent bike 10'            HR x30            Mini squats x30            SLS foam 30"x3            Side stepping GTB 3 laps            Step ups 6in x30            Lateral step ups 6in x30            Lateral step downs 6in x30            SLR x4 2# x30            Hamstring stretch 30"x3            gastroc stretch 30"x3                                                                                                                                     Modalities

## 2018-05-01 DIAGNOSIS — E03.9 HYPOTHYROIDISM: ICD-10-CM

## 2018-05-01 DIAGNOSIS — R73.9 HYPERGLYCEMIA: ICD-10-CM

## 2018-05-01 DIAGNOSIS — R97.20 ELEVATED PROSTATE SPECIFIC ANTIGEN (PSA): ICD-10-CM

## 2018-05-02 ENCOUNTER — OFFICE VISIT (OUTPATIENT)
Dept: OBGYN CLINIC | Facility: OTHER | Age: 68
End: 2018-05-02
Payer: COMMERCIAL

## 2018-05-02 VITALS — HEART RATE: 71 BPM | DIASTOLIC BLOOD PRESSURE: 87 MMHG | SYSTOLIC BLOOD PRESSURE: 137 MMHG

## 2018-05-02 DIAGNOSIS — M17.12 PRIMARY OSTEOARTHRITIS OF LEFT KNEE: Primary | ICD-10-CM

## 2018-05-02 PROCEDURE — 99214 OFFICE O/P EST MOD 30 MIN: CPT | Performed by: ORTHOPAEDIC SURGERY

## 2018-05-02 NOTE — PROGRESS NOTES
Orthopaedic Surgery - Office Note  Antonio Dalton (81 y o  male)   : 1950   MRN: [id number]  Encounter Date: 2018    Chief Complaint   Patient presents with    Left Knee - Follow-up       Assessment / Plan  Left knee osteoarthritis with degenerative medial meniscus tear and chronic PCL injury    · After discussion of conservative treatment options for knee arthritis including steroid injections, therapy, NSAIDs, bracing and viscosupplementation, patient would like to proceed with viscosupplementation in the left knee  Synvisc-One will be ordered and will notify the patient when they have authorization  · Continue with activity as tolerated  · Continue with bracing at this time the patient has a hinged knee brace and medial  that he uses while working  · Continue with home PT for knee, hip and thigh strengthening  · Anti-inflammatories or Tylenol prn pain  Return in about 2 months (around 2018)  The patient will be notified when the Abida Stocker is authorized and in 2 months return for possible steroid injection in his right arthritic shoulder  History of Present Illness  Antonio Dalton is a 79 y o  male follow-up for left knee pain secondary to osteoarthritis  Initial injury was in 2016  He had mild knee pain since then  He had increased pain after a low-energy twisting injury on 18  He has complained of medial knee pain and swelling with locking since then  Pain has gradually improved over the past 4 weeks  Patient has used a medial  brace with some success and also has a hinged knee brace that he wears on occasion  He did have a steroid injection on 2018 which she feels did help but he continues with medial joint line discomfort especially when walking extended distances  He has been doing outpatient physical therapy which he feels has helped  Denies instability or numbness in the LLE  Review of Systems  Pertinent items are noted in HPI    All other systems were reviewed and are negative  Physical Exam  /87   Pulse 71   Cons: Appears well  No apparent distress  Psych: Alert  Oriented x3  Mood and affect normal   Eyes: PERRLA, EOMI  Resp: Normal effort  No audible wheezing or stridor  CV: Palpable pulse  No discernable arrhythmia  No LE edema  Lymph:  No palpable cervical, axillary, or inguinal lymphadenopathy  Skin: Warm  No palpable masses  No visible lesions  Neuro: Normal muscle tone  Normal and symmetric DTR's  Left Knee Exam  Alignment:  mild genu varus  Inspection:  moderate swelling  No deformity  Palpation:  No tenderness  ROM:  Knee Extension 0  Knee Flexion 125  Strength:  5/5 quadriceps and hamstrings  Stability:  No objective knee instability  Stable Varus / Valgus stress, Lachman, and Posterior drawer  Tests:  (-) Addie  Patella:  Patella tracks centrally with crepitus  Neurovascular:  Sensation intact in DP/SP/Luevano/Sa/T nerve distributions  2+ DP & PT pulses  Gait:  Heel-to-toe  Studies Reviewed  I have personally reviewed pertinent films in PACS  XR of left knee - moderate degenerative changes with medial joint space narrowing  MRI of left knee - severe medial compartment chondral loss with large degenerative medial meniscus tear    Procedures       Medical, Surgical, Family, and Social History  The patient's medical history, family history, and social history, were reviewed and updated as appropriate  Past Medical History:   Diagnosis Date    Disease of thyroid gland     last assessed 10/13/2016    History of radiation therapy     Impression 02/18/2016, of thymus as an infant    Primary osteoarthritis of left knee     last assessed 04/13/2016       History reviewed  No pertinent surgical history  Family History   Problem Relation Age of Onset    Heart disease Father     Heart failure Father     Dementia Mother        Social History     Occupational History    Not on file       Social History Main Topics    Smoking status: Never Smoker    Smokeless tobacco: Never Used    Alcohol use Yes      Comment: occasional    Drug use: Unknown    Sexual activity: Not on file       No Known Allergies      Current Outpatient Prescriptions:     naproxen (EC NAPROSYN) 500 MG EC tablet, Take 1 tablet (500 mg total) by mouth 2 (two) times a day with meals (Patient taking differently: Take 500 mg by mouth as needed  ), Disp: 40 tablet, Rfl: 0      Catia Torres PA-C    Scribe Attestation    I,:    am acting as a scribe while in the presence of the attending physician :        I,:    personally performed the services described in this documentation    as scribed in my presence :

## 2018-05-03 ENCOUNTER — TELEPHONE (OUTPATIENT)
Dept: FAMILY MEDICINE CLINIC | Facility: CLINIC | Age: 68
End: 2018-05-03

## 2018-05-03 ENCOUNTER — APPOINTMENT (OUTPATIENT)
Dept: LAB | Facility: MEDICAL CENTER | Age: 68
End: 2018-05-03
Payer: COMMERCIAL

## 2018-05-03 DIAGNOSIS — R73.9 HYPERGLYCEMIA: ICD-10-CM

## 2018-05-03 DIAGNOSIS — R97.20 ELEVATED PROSTATE SPECIFIC ANTIGEN (PSA): ICD-10-CM

## 2018-05-03 DIAGNOSIS — Z11.59 ENCOUNTER FOR HEPATITIS C SCREENING TEST FOR LOW RISK PATIENT: ICD-10-CM

## 2018-05-03 DIAGNOSIS — E03.9 HYPOTHYROIDISM: ICD-10-CM

## 2018-05-03 LAB
ALBUMIN SERPL BCP-MCNC: 3.9 G/DL (ref 3.5–5)
ALP SERPL-CCNC: 55 U/L (ref 46–116)
ALT SERPL W P-5'-P-CCNC: 25 U/L (ref 12–78)
ANION GAP SERPL CALCULATED.3IONS-SCNC: 5 MMOL/L (ref 4–13)
AST SERPL W P-5'-P-CCNC: 16 U/L (ref 5–45)
BASOPHILS # BLD AUTO: 0.02 THOUSANDS/ΜL (ref 0–0.1)
BASOPHILS NFR BLD AUTO: 0 % (ref 0–1)
BILIRUB SERPL-MCNC: 0.5 MG/DL (ref 0.2–1)
BUN SERPL-MCNC: 15 MG/DL (ref 5–25)
CALCIUM SERPL-MCNC: 8.9 MG/DL (ref 8.3–10.1)
CHLORIDE SERPL-SCNC: 105 MMOL/L (ref 100–108)
CHOLEST SERPL-MCNC: 202 MG/DL (ref 50–200)
CO2 SERPL-SCNC: 28 MMOL/L (ref 21–32)
CREAT SERPL-MCNC: 0.89 MG/DL (ref 0.6–1.3)
EOSINOPHIL # BLD AUTO: 0.23 THOUSAND/ΜL (ref 0–0.61)
EOSINOPHIL NFR BLD AUTO: 4 % (ref 0–6)
ERYTHROCYTE [DISTWIDTH] IN BLOOD BY AUTOMATED COUNT: 13.4 % (ref 11.6–15.1)
EST. AVERAGE GLUCOSE BLD GHB EST-MCNC: 103 MG/DL
GFR SERPL CREATININE-BSD FRML MDRD: 88 ML/MIN/1.73SQ M
GLUCOSE P FAST SERPL-MCNC: 92 MG/DL (ref 65–99)
HBA1C MFR BLD: 5.2 % (ref 4.2–6.3)
HCT VFR BLD AUTO: 43.6 % (ref 36.5–49.3)
HCV AB SER QL: NORMAL
HDLC SERPL-MCNC: 69 MG/DL (ref 40–60)
HGB BLD-MCNC: 15 G/DL (ref 12–17)
LDLC SERPL CALC-MCNC: 110 MG/DL (ref 0–100)
LYMPHOCYTES # BLD AUTO: 1.41 THOUSANDS/ΜL (ref 0.6–4.47)
LYMPHOCYTES NFR BLD AUTO: 21 % (ref 14–44)
MCH RBC QN AUTO: 33 PG (ref 26.8–34.3)
MCHC RBC AUTO-ENTMCNC: 34.4 G/DL (ref 31.4–37.4)
MCV RBC AUTO: 96 FL (ref 82–98)
MONOCYTES # BLD AUTO: 0.6 THOUSAND/ΜL (ref 0.17–1.22)
MONOCYTES NFR BLD AUTO: 9 % (ref 4–12)
NEUTROPHILS # BLD AUTO: 4.34 THOUSANDS/ΜL (ref 1.85–7.62)
NEUTS SEG NFR BLD AUTO: 66 % (ref 43–75)
NONHDLC SERPL-MCNC: 133 MG/DL
NRBC BLD AUTO-RTO: 0 /100 WBCS
PLATELET # BLD AUTO: 151 THOUSANDS/UL (ref 149–390)
PMV BLD AUTO: 11.4 FL (ref 8.9–12.7)
POTASSIUM SERPL-SCNC: 4.5 MMOL/L (ref 3.5–5.3)
PROT SERPL-MCNC: 7 G/DL (ref 6.4–8.2)
PSA SERPL-MCNC: 6.8 NG/ML (ref 0–4)
RBC # BLD AUTO: 4.55 MILLION/UL (ref 3.88–5.62)
SODIUM SERPL-SCNC: 138 MMOL/L (ref 136–145)
TRIGL SERPL-MCNC: 115 MG/DL
TSH SERPL DL<=0.05 MIU/L-ACNC: 4.87 UIU/ML (ref 0.36–3.74)
WBC # BLD AUTO: 6.62 THOUSAND/UL (ref 4.31–10.16)

## 2018-05-03 PROCEDURE — 80053 COMPREHEN METABOLIC PANEL: CPT

## 2018-05-03 PROCEDURE — 83036 HEMOGLOBIN GLYCOSYLATED A1C: CPT

## 2018-05-03 PROCEDURE — 86803 HEPATITIS C AB TEST: CPT

## 2018-05-03 PROCEDURE — 36415 COLL VENOUS BLD VENIPUNCTURE: CPT

## 2018-05-03 PROCEDURE — 80061 LIPID PANEL: CPT

## 2018-05-03 PROCEDURE — 84443 ASSAY THYROID STIM HORMONE: CPT

## 2018-05-03 PROCEDURE — 84153 ASSAY OF PSA TOTAL: CPT

## 2018-05-03 PROCEDURE — 85025 COMPLETE CBC W/AUTO DIFF WBC: CPT

## 2018-05-03 NOTE — TELEPHONE ENCOUNTER
Susie Vaz already got results off [employer]      ----- Message from Garett Caballero PA-C sent at 5/3/2018  2:46 PM EDT -----  Call  Pt  Hep  c  Antibody is negative

## 2018-05-09 ENCOUNTER — OFFICE VISIT (OUTPATIENT)
Dept: FAMILY MEDICINE CLINIC | Facility: CLINIC | Age: 68
End: 2018-05-09
Payer: COMMERCIAL

## 2018-05-09 VITALS
HEART RATE: 96 BPM | RESPIRATION RATE: 16 BRPM | BODY MASS INDEX: 27.22 KG/M2 | SYSTOLIC BLOOD PRESSURE: 110 MMHG | HEIGHT: 71 IN | WEIGHT: 194.4 LBS | OXYGEN SATURATION: 98 % | DIASTOLIC BLOOD PRESSURE: 72 MMHG | TEMPERATURE: 97.6 F

## 2018-05-09 DIAGNOSIS — E03.9 ACQUIRED HYPOTHYROIDISM: Primary | ICD-10-CM

## 2018-05-09 DIAGNOSIS — H61.22 LEFT EAR IMPACTED CERUMEN: ICD-10-CM

## 2018-05-09 PROCEDURE — 99214 OFFICE O/P EST MOD 30 MIN: CPT | Performed by: FAMILY MEDICINE

## 2018-05-09 RX ORDER — BILBERRY FRUIT 1000 MG
CAPSULE ORAL
COMMUNITY
End: 2020-05-12

## 2018-05-09 NOTE — PROGRESS NOTES
Assessment/Plan:         Diagnoses and all orders for this visit:    Acquired hypothyroidism  Comments:  trial of naturethroid, recheck TSH in 3 months  Orders:  -     thyroid (ARMOUR) 32 5 MG tablet; Take 1 tablet (32 5 mg total) by mouth daily  -     TSH, 3rd generation; Future    Left ear impacted cerumen  Comments:  use debrox at home, flush with warm water, return to office prn    Other orders  -     Misc Natural Products (JOINT SUPPORT PO); Take by mouth  -     Saw [place] 1000 MG CAPS; Take by mouth  -     TURMERIC PO; Take by mouth          Subjective:      Patient ID: Joe Robles is a 79 y o  male  Pt here to f/u chronic conditions, A1C stable, lipids at goal, BP controlled  TSH up to 4 8, has been borderline high for a while, pt consents to trying nature-throid as his wife takes  PSA stable at 6 8, had a normal colonscopy 10 yrs ago, would like to avoid Prostate biopsy  L ear feels clogged  Has had ear flushed before  Works on the farm, 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, 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  Has not used any drops on that side  Overall feeling well  The following portions of the patient's history were reviewed and updated as appropriate: allergies, current medications, past family history, past medical history, past social history, past surgical history and problem list     Review of Systems      Objective:      /72 (BP Location: Left arm, Patient Position: Sitting, Cuff Size: Standard)   Pulse 96   Temp 97 6 °F (36 4 °C)   Resp 16   Ht 5' 11" (1 803 m)   Wt 88 2 kg (194 lb 6 4 oz)   SpO2 98%   BMI 27 11 kg/m²          Physical Exam   Constitutional: He is oriented to person, place, and time  He appears well-developed and well-nourished  HENT:   Right Ear: External ear normal    Mouth/Throat: Oropharynx is clear and moist    L ear with cerumen impaction, attempted to irrigate but unsuccessful   Neck: Normal range of motion  Neck supple  No thyromegaly present     Cardiovascular: Normal rate, regular rhythm and normal heart sounds  Pulmonary/Chest: Effort normal and breath sounds normal    Musculoskeletal: Normal range of motion  Lymphadenopathy:     He has no cervical adenopathy  Neurological: He is alert and oriented to person, place, and time  Skin: Skin is warm  Psychiatric: He has a normal mood and affect   His behavior is normal  Judgment and thought content normal

## 2018-05-21 ENCOUNTER — TELEPHONE (OUTPATIENT)
Dept: OBGYN CLINIC | Facility: HOSPITAL | Age: 68
End: 2018-05-21

## 2018-05-21 NOTE — TELEPHONE ENCOUNTER
Spoke with patients wife asking that patient can call me back to schedule an appointment for left knee injection (synvisc-one) with Dr Beatrice Franklin

## 2018-05-22 ENCOUNTER — OFFICE VISIT (OUTPATIENT)
Dept: OBGYN CLINIC | Facility: MEDICAL CENTER | Age: 68
End: 2018-05-22
Payer: COMMERCIAL

## 2018-05-22 VITALS
WEIGHT: 190 LBS | SYSTOLIC BLOOD PRESSURE: 125 MMHG | HEART RATE: 83 BPM | HEIGHT: 71 IN | BODY MASS INDEX: 26.6 KG/M2 | DIASTOLIC BLOOD PRESSURE: 81 MMHG

## 2018-05-22 DIAGNOSIS — M17.12 PRIMARY OSTEOARTHRITIS OF LEFT KNEE: Primary | ICD-10-CM

## 2018-05-22 PROCEDURE — 20610 DRAIN/INJ JOINT/BURSA W/O US: CPT | Performed by: PHYSICIAN ASSISTANT

## 2018-05-22 RX ORDER — BUPIVACAINE HYDROCHLORIDE 2.5 MG/ML
4 INJECTION, SOLUTION INFILTRATION; PERINEURAL
Status: COMPLETED | OUTPATIENT
Start: 2018-05-22 | End: 2018-05-22

## 2018-05-22 RX ADMIN — BUPIVACAINE HYDROCHLORIDE 4 ML: 2.5 INJECTION, SOLUTION INFILTRATION; PERINEURAL at 12:43

## 2018-05-22 NOTE — PROGRESS NOTES
Orthopaedic Surgery - Office Note  Binta Graff (79 y o  male)   : 1950   MRN: [id number]  Encounter Date: 2018    Chief Complaint   Patient presents with    Left Knee - Follow-up       Assessment / Plan  Left knee osteoarthritis with degenerative medial meniscus tear and chronic PCL injury    · We did again discuss conservative treatment options for knee arthritis including steroid injections, therapy, NSAIDs, bracing and viscosupplementation  The patient would like to proceed with viscosupplementation in the left knee at this time  · Continue with activity as tolerated  · Continue with bracing at this time the patient has a hinged knee brace and medial  that he uses while working  · Continue with home PT for knee, hip and thigh strengthening  · Anti-inflammatories or Tylenol prn pain  Return for Appointment scheduled for approximately 6 week in July for knee and shoulder  History of Present Illness  Binta Graff is a 79 y o  male follow-up for left knee pain secondary to osteoarthritis  Initial injury was in   He had mild knee pain since then  He had increased pain after a low-energy twisting injury on 18  He has complained of medial knee pain and swelling with locking since then  Patient has used a medial  brace with some success and also has a hinged knee brace that he wears on occasion  He did have a steroid injection on 2018 which she feels did help but he continues with medial joint line discomfort especially when walking extended distances  He has been doing outpatient physical therapy which he feels has helped  Denies instability or numbness in the LLE  He is coming in today to proceed with Synvisc-One injection  Review of Systems  Pertinent items are noted in HPI  All other systems were reviewed and are negative  Physical Exam  /81   Pulse 83   Ht 5' 11" (1 803 m)   Wt 86 2 kg (190 lb)   BMI 26 50 kg/m²   Cons: Appears well  No apparent distress  Psych: Alert  Oriented x3  Mood and affect normal   Eyes: PERRLA, EOMI  Resp: Normal effort  No audible wheezing or stridor  CV: Palpable pulse  No discernable arrhythmia  No LE edema  Lymph:  No palpable cervical, axillary, or inguinal lymphadenopathy  Skin: Warm  No palpable masses  No visible lesions  Neuro: Normal muscle tone  Normal and symmetric DTR's  Left Knee Exam  Alignment:  mild genu varus  Inspection:  moderate swelling  No deformity  Palpation:  No tenderness  ROM:  Knee Extension 0  Knee Flexion 125  Strength:  5/5 quadriceps and hamstrings  Stability:  No objective knee instability  Stable Varus / Valgus stress, Lachman, and Posterior drawer  Tests:  (-) Addie  Patella:  Patella tracks centrally with crepitus  Neurovascular:  Sensation intact in DP/SP/Luevano/Sa/T nerve distributions  2+ DP & PT pulses  Gait:  Heel-to-toe  Studies Reviewed  I have personally reviewed pertinent films in PACS  XR of left knee - moderate degenerative changes with medial joint space narrowing  MRI of left knee - severe medial compartment chondral loss with large degenerative medial meniscus tear    Large joint arthrocentesis  Date/Time: 5/22/2018 12:43 PM  Consent given by: patient  Supporting Documentation  Indications: pain   Procedure Details  Location: knee - L knee  Needle size: 18 G  Ultrasound guidance: no  Approach: lateral  Medications administered: 4 mL bupivacaine 0 25 %; 48 mg hylan 48 MG/6ML    Aspirate amount: 6 mL  Aspirate: clear, serous and yellow  Patient tolerance: patient tolerated the procedure well with no immediate complications  Dressing:  Sterile dressing applied           Medical, Surgical, Family, and Social History  The patient's medical history, family history, and social history, were reviewed and updated as appropriate      Past Medical History:   Diagnosis Date    Disease of thyroid gland     last assessed 10/13/2016    History of radiation therapy     Impression 02/18/2016, of thymus as an infant    Primary osteoarthritis of left knee     last assessed 04/13/2016       History reviewed  No pertinent surgical history  Family History   Problem Relation Age of Onset    Heart disease Father     Heart failure Father     Dementia Mother        Social History     Occupational History    Not on file       Social History Main Topics    Smoking status: Never Smoker    Smokeless tobacco: Never Used    Alcohol use Yes      Comment: occasional    Drug use: Unknown    Sexual activity: Not on file       No Known Allergies      Current Outpatient Prescriptions:     Misc Natural Products (JOINT SUPPORT PO), Take by mouth, Disp: , Rfl:     naproxen (EC NAPROSYN) 500 MG EC tablet, Take 1 tablet (500 mg total) by mouth 2 (two) times a day with meals (Patient taking differently: Take 500 mg by mouth as needed  ), Disp: 40 tablet, Rfl: 0    Saw [place] 1000 MG CAPS, Take by mouth, Disp: , Rfl:     thyroid (ARMOUR) 32 5 MG tablet, Take 1 tablet (32 5 mg total) by mouth daily, Disp: 90 tablet, Rfl: 0    TURMERIC PO, Take by mouth, Disp: , Rfl:       Nate Robbins PA-C    Scribe Attestation    I,:    am acting as a scribe while in the presence of the attending physician :        I,:    personally performed the services described in this documentation    as scribed in my presence :

## 2018-06-06 ENCOUNTER — APPOINTMENT (OUTPATIENT)
Dept: LAB | Facility: MEDICAL CENTER | Age: 68
End: 2018-06-06
Payer: COMMERCIAL

## 2018-06-06 ENCOUNTER — OFFICE VISIT (OUTPATIENT)
Dept: FAMILY MEDICINE CLINIC | Facility: CLINIC | Age: 68
End: 2018-06-06
Payer: COMMERCIAL

## 2018-06-06 VITALS
HEIGHT: 71 IN | SYSTOLIC BLOOD PRESSURE: 130 MMHG | BODY MASS INDEX: 27.1 KG/M2 | HEART RATE: 93 BPM | TEMPERATURE: 97.6 F | OXYGEN SATURATION: 97 % | WEIGHT: 193.6 LBS | DIASTOLIC BLOOD PRESSURE: 82 MMHG

## 2018-06-06 DIAGNOSIS — E03.9 ACQUIRED HYPOTHYROIDISM: ICD-10-CM

## 2018-06-06 DIAGNOSIS — R19.7 DIARRHEA OF PRESUMED INFECTIOUS ORIGIN: Primary | ICD-10-CM

## 2018-06-06 DIAGNOSIS — R19.7 DIARRHEA OF PRESUMED INFECTIOUS ORIGIN: ICD-10-CM

## 2018-06-06 LAB
ALBUMIN SERPL BCP-MCNC: 3.9 G/DL (ref 3.5–5)
ALP SERPL-CCNC: 62 U/L (ref 46–116)
ALT SERPL W P-5'-P-CCNC: 31 U/L (ref 12–78)
AST SERPL W P-5'-P-CCNC: 22 U/L (ref 5–45)
BASOPHILS # BLD AUTO: 0.03 THOUSANDS/ΜL (ref 0–0.1)
BASOPHILS NFR BLD AUTO: 1 % (ref 0–1)
BILIRUB DIRECT SERPL-MCNC: 0.13 MG/DL (ref 0–0.2)
BILIRUB SERPL-MCNC: 0.65 MG/DL (ref 0.2–1)
EOSINOPHIL # BLD AUTO: 0.15 THOUSAND/ΜL (ref 0–0.61)
EOSINOPHIL NFR BLD AUTO: 3 % (ref 0–6)
ERYTHROCYTE [DISTWIDTH] IN BLOOD BY AUTOMATED COUNT: 12.9 % (ref 11.6–15.1)
HCT VFR BLD AUTO: 44.7 % (ref 36.5–49.3)
HGB BLD-MCNC: 15.3 G/DL (ref 12–17)
IMM GRANULOCYTES # BLD AUTO: 0.01 THOUSAND/UL (ref 0–0.2)
IMM GRANULOCYTES NFR BLD AUTO: 0 % (ref 0–2)
LYMPHOCYTES # BLD AUTO: 1.17 THOUSANDS/ΜL (ref 0.6–4.47)
LYMPHOCYTES NFR BLD AUTO: 20 % (ref 14–44)
MCH RBC QN AUTO: 32.8 PG (ref 26.8–34.3)
MCHC RBC AUTO-ENTMCNC: 34.2 G/DL (ref 31.4–37.4)
MCV RBC AUTO: 96 FL (ref 82–98)
MONOCYTES # BLD AUTO: 0.61 THOUSAND/ΜL (ref 0.17–1.22)
MONOCYTES NFR BLD AUTO: 10 % (ref 4–12)
NEUTROPHILS # BLD AUTO: 4.04 THOUSANDS/ΜL (ref 1.85–7.62)
NEUTS SEG NFR BLD AUTO: 66 % (ref 43–75)
NRBC BLD AUTO-RTO: 0 /100 WBCS
PLATELET # BLD AUTO: 162 THOUSANDS/UL (ref 149–390)
PMV BLD AUTO: 10.7 FL (ref 8.9–12.7)
PROT SERPL-MCNC: 7.2 G/DL (ref 6.4–8.2)
RBC # BLD AUTO: 4.67 MILLION/UL (ref 3.88–5.62)
TSH SERPL DL<=0.05 MIU/L-ACNC: 2.6 UIU/ML (ref 0.36–3.74)
WBC # BLD AUTO: 6.01 THOUSAND/UL (ref 4.31–10.16)

## 2018-06-06 PROCEDURE — 36415 COLL VENOUS BLD VENIPUNCTURE: CPT | Performed by: PHYSICIAN ASSISTANT

## 2018-06-06 PROCEDURE — 80076 HEPATIC FUNCTION PANEL: CPT

## 2018-06-06 PROCEDURE — 3008F BODY MASS INDEX DOCD: CPT | Performed by: PHYSICIAN ASSISTANT

## 2018-06-06 PROCEDURE — 99214 OFFICE O/P EST MOD 30 MIN: CPT | Performed by: PHYSICIAN ASSISTANT

## 2018-06-06 PROCEDURE — 84443 ASSAY THYROID STIM HORMONE: CPT

## 2018-06-06 PROCEDURE — 85025 COMPLETE CBC W/AUTO DIFF WBC: CPT | Performed by: PHYSICIAN ASSISTANT

## 2018-06-06 NOTE — PROGRESS NOTES
Assessment/Plan:         Diagnoses and all orders for this visit:    Diarrhea of presumed infectious origin  Comments:    Stool studies ordered CBC and hepatic functions ordered  Stop the Imodium  Hold Mongo Thyroid  Orders:  -     Stool Enteric Bacterial Panel by PCR; Future  -     Ova and parasite examination; Future  -     Clostridium difficile toxin by PCR; Future  -     CBC and differential  -     Hepatic function panel; Future          Subjective:      Patient ID: Skyler Bhardwaj is a 79 y o  male  Patient presents with 1 months of loose watery stools  Patient states it occurred after eating Lantus at a restaurant now  Patient denies foreign travel  No recent antibiotic use  Patient states his bowels are loose and watery sometimes 3 per day  Some nausea  No fever no blood in the stool and no weight loss  Patient stopped tumor echos he thought this may be cause of it  Patient also was started on Mongo Thyroid around the same time this began  Patient took over-the-counter Imodium 2 bottles were with no relief  The following portions of the patient's history were reviewed and updated as appropriate:   He  has a past medical history of Disease of thyroid gland; History of radiation therapy; and Primary osteoarthritis of left knee    He   Patient Active Problem List    Diagnosis Date Noted    Diarrhea of presumed infectious origin 06/06/2018    Primary osteoarthritis of left knee 05/02/2018    Pain in left knee 03/16/2018    Elevated blood sugar level 11/02/2016    Glenohumeral arthritis, right 02/17/2016    Elevated prostate specific antigen (PSA) 05/31/2013     Current Outpatient Prescriptions   Medication Sig Dispense Refill    Misc Natural Products (JOINT SUPPORT PO) Take by mouth      naproxen (EC NAPROSYN) 500 MG EC tablet Take 1 tablet (500 mg total) by mouth 2 (two) times a day with meals (Patient taking differently: Take 500 mg by mouth as needed  ) 40 tablet 0    Saw Esmond 1000 MG CAPS Take by mouth      thyroid (ARMOUR) 32 5 MG tablet Take 1 tablet (32 5 mg total) by mouth daily 90 tablet 0    TURMERIC PO Take by mouth       No current facility-administered medications for this visit  Current Outpatient Prescriptions on File Prior to Visit   Medication Sig    Misc Natural Products (JOINT SUPPORT PO) Take by mouth    naproxen (EC NAPROSYN) 500 MG EC tablet Take 1 tablet (500 mg total) by mouth 2 (two) times a day with meals (Patient taking differently: Take 500 mg by mouth as needed  )    Saw Esmond 1000 MG CAPS Take by mouth    thyroid (ARMOUR) 32 5 MG tablet Take 1 tablet (32 5 mg total) by mouth daily    TURMERIC PO Take by mouth     No current facility-administered medications on file prior to visit  He has No Known Allergies       Review of Systems   Constitutional: Positive for appetite change  Negative for fatigue, fever and unexpected weight change  Gastrointestinal: Positive for diarrhea and nausea  Negative for abdominal pain and blood in stool  Genitourinary: Negative for dysuria  Musculoskeletal: Positive for arthralgias  Skin: Negative for rash  Neurological: Negative for dizziness  Objective:      /82 (BP Location: Left arm, Patient Position: Sitting, Cuff Size: Standard)   Pulse 93   Temp 97 6 °F (36 4 °C)   Ht 5' 11" (1 803 m)   Wt 87 8 kg (193 lb 9 6 oz)   SpO2 97%   BMI 27 00 kg/m²          Physical Exam   Constitutional: He is oriented to person, place, and time  He appears well-developed and well-nourished  No distress  HENT:   Right Ear: Tympanic membrane, external ear and ear canal normal  Decreased hearing is noted  Left Ear: Tympanic membrane, external ear and ear canal normal  Decreased hearing is noted  Mouth/Throat: Oropharynx is clear and moist    Eyes: Conjunctivae are normal  No scleral icterus  Cardiovascular: Normal rate, regular rhythm and normal heart sounds      No murmur heard   Pulmonary/Chest: Breath sounds normal    Abdominal: Soft  Bowel sounds are normal  He exhibits no distension and no mass  There is no tenderness  There is no guarding  Musculoskeletal: He exhibits no edema  Neurological: He is oriented to person, place, and time  Skin: Skin is warm and dry  Psychiatric: He has a normal mood and affect

## 2018-06-07 ENCOUNTER — APPOINTMENT (OUTPATIENT)
Dept: LAB | Facility: MEDICAL CENTER | Age: 68
End: 2018-06-07
Payer: COMMERCIAL

## 2018-06-07 DIAGNOSIS — R19.7 DIARRHEA OF PRESUMED INFECTIOUS ORIGIN: ICD-10-CM

## 2018-06-07 PROCEDURE — 87209 SMEAR COMPLEX STAIN: CPT

## 2018-06-07 PROCEDURE — 87177 OVA AND PARASITES SMEARS: CPT

## 2018-06-07 PROCEDURE — 87493 C DIFF AMPLIFIED PROBE: CPT

## 2018-06-07 PROCEDURE — 87505 NFCT AGENT DETECTION GI: CPT

## 2018-06-08 ENCOUNTER — TELEPHONE (OUTPATIENT)
Dept: FAMILY MEDICINE CLINIC | Facility: CLINIC | Age: 68
End: 2018-06-08

## 2018-06-08 LAB
C DIFF TOX GENS STL QL NAA+PROBE: NORMAL
CAMPYLOBACTER DNA SPEC NAA+PROBE: NORMAL
O+P STL CONC: ABNORMAL
SALMONELLA DNA SPEC QL NAA+PROBE: NORMAL
SHIGA TOXIN STX GENE SPEC NAA+PROBE: NORMAL
SHIGELLA DNA SPEC QL NAA+PROBE: NORMAL

## 2018-06-12 ENCOUNTER — TELEPHONE (OUTPATIENT)
Dept: FAMILY MEDICINE CLINIC | Facility: CLINIC | Age: 68
End: 2018-06-12

## 2018-06-13 ENCOUNTER — TELEPHONE (OUTPATIENT)
Dept: FAMILY MEDICINE CLINIC | Facility: CLINIC | Age: 68
End: 2018-06-13

## 2018-06-13 NOTE — TELEPHONE ENCOUNTER
Spoke  With  Pt  His  Diarrhea  Is  Gone  Stool  Showed  blastosis  Hominis  No  Need  To treat  If  Better    Pt  states he  Drinks  Raw  Milk  Which  Is where  He  May  Have  Picked  This  up

## 2018-07-11 ENCOUNTER — OFFICE VISIT (OUTPATIENT)
Dept: OBGYN CLINIC | Facility: OTHER | Age: 68
End: 2018-07-11
Payer: COMMERCIAL

## 2018-07-11 VITALS
HEIGHT: 71 IN | BODY MASS INDEX: 26.46 KG/M2 | DIASTOLIC BLOOD PRESSURE: 82 MMHG | HEART RATE: 68 BPM | WEIGHT: 189 LBS | SYSTOLIC BLOOD PRESSURE: 122 MMHG

## 2018-07-11 DIAGNOSIS — S83.242D ACUTE MEDIAL MENISCUS TEAR OF LEFT KNEE, SUBSEQUENT ENCOUNTER: ICD-10-CM

## 2018-07-11 DIAGNOSIS — M17.12 PRIMARY OSTEOARTHRITIS OF LEFT KNEE: Primary | ICD-10-CM

## 2018-07-11 PROCEDURE — 20610 DRAIN/INJ JOINT/BURSA W/O US: CPT | Performed by: ORTHOPAEDIC SURGERY

## 2018-07-11 PROCEDURE — 99213 OFFICE O/P EST LOW 20 MIN: CPT | Performed by: ORTHOPAEDIC SURGERY

## 2018-07-11 RX ORDER — NAPROXEN 500 MG/1
500 TABLET ORAL 2 TIMES DAILY WITH MEALS
Qty: 60 TABLET | Refills: 2 | Status: SHIPPED | OUTPATIENT
Start: 2018-07-11 | End: 2018-07-23

## 2018-07-11 RX ORDER — METHYLPREDNISOLONE ACETATE 40 MG/ML
1 INJECTION, SUSPENSION INTRA-ARTICULAR; INTRALESIONAL; INTRAMUSCULAR; SOFT TISSUE
Status: COMPLETED | OUTPATIENT
Start: 2018-07-11 | End: 2018-07-11

## 2018-07-11 RX ORDER — BUPIVACAINE HYDROCHLORIDE 2.5 MG/ML
4 INJECTION, SOLUTION INFILTRATION; PERINEURAL
Status: COMPLETED | OUTPATIENT
Start: 2018-07-11 | End: 2018-07-11

## 2018-07-11 RX ADMIN — BUPIVACAINE HYDROCHLORIDE 4 ML: 2.5 INJECTION, SOLUTION INFILTRATION; PERINEURAL at 13:14

## 2018-07-11 RX ADMIN — METHYLPREDNISOLONE ACETATE 1 ML: 40 INJECTION, SUSPENSION INTRA-ARTICULAR; INTRALESIONAL; INTRAMUSCULAR; SOFT TISSUE at 13:14

## 2018-07-11 NOTE — PROGRESS NOTES
Orthopaedic Surgery - Office Note  Skyler Bhardwaj (89 y o  male)   : 1950   MRN: [id number]  Encounter Date: 2018    Chief Complaint   Patient presents with    Left Knee - Follow-up    Right Shoulder - Follow-up       Assessment / Plan    Left knee osteoarthritis with degenerative medial meniscus tear and chronic PCL injury  · CSI given into left knee  · Continue with HEP  · Continue with bracing at this time the patient has a hinged knee brace and medial  that he uses while working  · Refill of Naproxen was given  · Pt would like to plan for Synvisc one in the left knee in November (last one was 18)  Return in about 3 months (around 10/11/2018)  Right glenohumeral osteoarthritis  · Glenohumeral CSI was given  · HEP  · Anti-inflammatories prn    History of Present Illness  Skyler Bhardwaj is a 79 y o  male follow-up for left knee pain secondary to osteoarthritis  Initial injury was in   He had mild knee pain since then  He had increased pain after a low-energy twisting injury on 18  He has complained of medial knee pain and swelling with locking since then  Patient has used a medial  brace with some success and also has a hinged knee brace that he wears on occasion  He did have a steroid injection on 2018 which she feels did help but he continues with medial joint line discomfort especially when walking extended distances  He has been doing outpatient physical therapy which he feels has helped  He had Synvisc One 18 with reasonable result  Overall he seems to be improving slowly  He also has right glenohumeral OA which had been managed by Dr Deb Mar with CSI and HEP  He is reporting gradual return of posterior shoulder pain  He is asking for CSI today  Last injection was 3/2017  Review of Systems  Pertinent items are noted in HPI  All other systems were reviewed and are negative      Physical Exam  /82   Pulse 68   Ht 5' 11" (1 803 m)   Wt 85 7 kg (189 lb)   BMI 26 36 kg/m²   Cons: Appears well  No apparent distress  Psych: Alert  Oriented x3  Mood and affect normal   Eyes: PERRLA, EOMI  Resp: Normal effort  No audible wheezing or stridor  CV: Palpable pulse  No discernable arrhythmia  No LE edema  Lymph:  No palpable cervical, axillary, or inguinal lymphadenopathy  Skin: Warm  No palpable masses  No visible lesions  Neuro: Normal muscle tone  Normal and symmetric DTR's  Left Knee Exam  Alignment:  mild genu varus  Inspection:  minimal swelling  No deformity  Palpation:  No tenderness  ROM:  Knee Extension 0  Knee Flexion 125  Strength:  5/5 quadriceps and hamstrings  Stability:  No objective knee instability  Stable Varus / Valgus stress, Lachman, and Posterior drawer  Tests:  (-) Addie  Patella:  Patella tracks centrally with crepitus  Neurovascular:  Sensation intact in DP/SP/Luevano/Sa/T nerve distributions  2+ DP & PT pulses  Gait:  Heel-to-toe  Right Shoulder Exam  Alignment / Posture:  Normal shoulder posture  Inspection:  No swelling  No muscle atrophy  Palpation:  moderate posterior glenohumeral tenderness  ROM:  Shoulder   Shoulder ER 30  Shoulder IR T10  Strength:  5/5 supraspinatus, infraspinatus, and subscapularis  Stability:  No objective shoulder instability  Tests: No pertinent positive or negative tests  Neurovascular:  Sensation intact in Ax/R/M/U nerve distributions  2+ radial pulse  Studies Reviewed  No studies to review    Large joint arthrocentesis  Date/Time: 7/11/2018 1:14 PM  Consent given by: patient  Supporting Documentation  Indications: pain   Procedure Details  Location: knee - L knee  Preparation: Alcohol    Needle size: 22 G  Approach: superolateral   Medications administered: 4 mL bupivacaine 0 25 %; 1 mL methylPREDNISolone acetate 40 mg/mL    Patient tolerance: patient tolerated the procedure well with no immediate complications  Dressing:  Sterile dressing applied  Large joint arthrocentesis  Date/Time: 7/11/2018 1:14 PM  Consent given by: patient  Supporting Documentation  Indications: pain   Procedure Details  Location: shoulder - R glenohumeral  Preparation: Alcohol  Needle size: 22 G  Approach: anterior  Medications administered: 4 mL bupivacaine 0 25 %; 1 mL methylPREDNISolone acetate 40 mg/mL    Patient tolerance: patient tolerated the procedure well with no immediate complications  Dressing:  Sterile dressing applied           Medical, Surgical, Family, and Social History  The patient's medical history, family history, and social history, were reviewed and updated as appropriate  Past Medical History:   Diagnosis Date    Disease of thyroid gland     last assessed 10/13/2016    History of radiation therapy     Impression 02/18/2016, of thymus as an infant    Primary osteoarthritis of left knee     last assessed 04/13/2016       History reviewed  No pertinent surgical history  Family History   Problem Relation Age of Onset    Heart disease Father     Heart failure Father     Dementia Mother        Social History     Occupational History    Not on file       Social History Main Topics    Smoking status: Never Smoker    Smokeless tobacco: Never Used    Alcohol use Yes      Comment: occasional    Drug use: Unknown    Sexual activity: Not on file       No Known Allergies      Current Outpatient Prescriptions:     Misc Natural Products (JOINT SUPPORT PO), Take by mouth, Disp: , Rfl:     naproxen (EC NAPROSYN) 500 MG EC tablet, Take 1 tablet (500 mg total) by mouth 2 (two) times a day with meals, Disp: 60 tablet, Rfl: 2    Saw Palmetto 1000 MG CAPS, Take by mouth, Disp: , Rfl:     thyroid (ARMOUR) 32 5 MG tablet, Take 1 tablet (32 5 mg total) by mouth daily, Disp: 90 tablet, Rfl: 0    TURMERIC PO, Take by mouth, Disp: , Rfl:       Jeremy Angeles MD    Scribe Attestation    I,:    am acting as a scribe while in the presence of the attending physician :        I,:    personally performed the services described in this documentation    as scribed in my presence :

## 2018-07-23 ENCOUNTER — TELEPHONE (OUTPATIENT)
Dept: OBGYN CLINIC | Facility: HOSPITAL | Age: 68
End: 2018-07-23

## 2018-07-23 DIAGNOSIS — M17.0 PRIMARY OSTEOARTHRITIS OF BOTH KNEES: Primary | ICD-10-CM

## 2018-07-23 DIAGNOSIS — M17.0 PRIMARY OSTEOARTHRITIS OF BOTH KNEES: ICD-10-CM

## 2018-07-23 RX ORDER — NAPROXEN 500 MG/1
500 TABLET ORAL 2 TIMES DAILY WITH MEALS
Qty: 60 TABLET | Refills: 3 | Status: SHIPPED | OUTPATIENT
Start: 2018-07-23 | End: 2018-07-23 | Stop reason: SDUPTHER

## 2018-07-23 RX ORDER — NAPROXEN 500 MG/1
500 TABLET ORAL 2 TIMES DAILY WITH MEALS
Qty: 60 TABLET | Refills: 0 | Status: SHIPPED | OUTPATIENT
Start: 2018-07-23 | End: 2018-08-29 | Stop reason: SDUPTHER

## 2018-07-23 NOTE — TELEPHONE ENCOUNTER
Dr Cesario Merlin -     Pt contacted [facility] requested refill of their medication  His initial Rx was for Naproxen but what was sent in was extended release according to his wife  When they had not heard about the RX being filled she called the pharmacy and was told they do not have the extended release  Can this be sent over as the regular naproxen again so patient can get his RX? Medication Name: naproxen    Dosage of Med: 500mg      Frequency of Med: 2 times daily with meals      Remaining Medication: unknown      Pharmacy and Location: [facility]/pharmacy #[id number]      Thank you

## 2018-08-29 DIAGNOSIS — M17.0 PRIMARY OSTEOARTHRITIS OF BOTH KNEES: ICD-10-CM

## 2018-08-29 RX ORDER — NAPROXEN 500 MG/1
TABLET ORAL
Qty: 60 TABLET | Refills: 0 | Status: SHIPPED | OUTPATIENT
Start: 2018-08-29 | End: 2019-01-31 | Stop reason: SDUPTHER

## 2018-09-22 ENCOUNTER — OFFICE VISIT (OUTPATIENT)
Dept: URGENT CARE | Facility: MEDICAL CENTER | Age: 68
End: 2018-09-22
Payer: COMMERCIAL

## 2018-09-22 VITALS
OXYGEN SATURATION: 95 % | HEART RATE: 85 BPM | TEMPERATURE: 98.1 F | SYSTOLIC BLOOD PRESSURE: 118 MMHG | RESPIRATION RATE: 18 BRPM | HEIGHT: 71 IN | WEIGHT: 191.2 LBS | BODY MASS INDEX: 26.77 KG/M2 | DIASTOLIC BLOOD PRESSURE: 72 MMHG

## 2018-09-22 DIAGNOSIS — J06.9 UPPER RESPIRATORY TRACT INFECTION, UNSPECIFIED TYPE: ICD-10-CM

## 2018-09-22 DIAGNOSIS — J02.9 SORE THROAT: Primary | ICD-10-CM

## 2018-09-22 LAB — S PYO AG THROAT QL: NEGATIVE

## 2018-09-22 PROCEDURE — 99213 OFFICE O/P EST LOW 20 MIN: CPT | Performed by: PHYSICIAN ASSISTANT

## 2018-09-22 PROCEDURE — 87430 STREP A AG IA: CPT | Performed by: PHYSICIAN ASSISTANT

## 2018-09-22 RX ORDER — BENZONATATE 200 MG/1
200 CAPSULE ORAL 3 TIMES DAILY PRN
Qty: 20 CAPSULE | Refills: 0 | Status: SHIPPED | OUTPATIENT
Start: 2018-09-22 | End: 2019-09-17

## 2018-09-22 NOTE — PROGRESS NOTES
[facility]        NAME: Page Melchor is a 76 y o  male  : 1950    MRN: [id number]  DATE: 2018  TIME: 1:30 PM    Assessment and Plan   Sore throat [J02 9]  1  Sore throat  POCT rapid strepA   2  Upper respiratory tract infection, unspecified type  benzonatate (TESSALON) 200 MG capsule         Patient Instructions     1  Motrin as needed for throat pain  2  Push fluids  3  Take Tessalon 1 every 8 hours as needed for cough  4  Follow up with PCP in 3-5 days if symptoms persist       Chief Complaint     Chief Complaint   Patient presents with    Cold Like Symptoms    Generalized Body Aches    Cough    Sore Throat    Headache         History of Present Illness       Patient is a 22-year-old male presents with a 3 day history of nasal discharge, cough, congestion and sore throat  He denies any fever, chills or body aches since the onset of his symptoms  Review of Systems   Review of Systems   Constitutional: Negative  HENT: Positive for congestion, postnasal drip, rhinorrhea and sore throat  Respiratory: Positive for cough  Gastrointestinal: Negative            Current Medications       Current Outpatient Prescriptions:     Misc Natural Products (JOINT SUPPORT PO), Take by mouth, Disp: , Rfl:     naproxen (NAPROSYN) 500 mg tablet, TAKE 1 TABLET BY MOUTH TWICE A DAY WITH MEALS, Disp: 60 tablet, Rfl: 0    Saw Palmetto 1000 MG CAPS, Take by mouth, Disp: , Rfl:     benzonatate (TESSALON) 200 MG capsule, Take 1 capsule (200 mg total) by mouth 3 (three) times a day as needed for cough, Disp: 20 capsule, Rfl: 0    thyroid (ARMOUR) 32 5 MG tablet, Take 1 tablet (32 5 mg total) by mouth daily (Patient not taking: Reported on 2018 ), Disp: 90 tablet, Rfl: 0    TURMERIC PO, Take by mouth, Disp: , Rfl:     Current Allergies     Allergies as of 2018    (No Known Allergies)            The following portions of the patient's history were reviewed and updated as appropriate: allergies, current medications, past family history, past medical history, past social history, past surgical history and problem list      Past Medical History:   Diagnosis Date    Disease of thyroid gland     last assessed 10/13/2016    History of radiation therapy     Impression 02/18/2016, of thymus as an infant    Primary osteoarthritis of left knee     last assessed 04/13/2016       No past surgical history on file  Family History   Problem Relation Age of Onset    Heart disease Father     Heart failure Father     Dementia Mother          Medications have been verified  Objective   /72   Pulse 85   Temp 98 1 °F (36 7 °C) (Temporal)   Resp 18   Ht 5' 11" (1 803 m)   Wt 86 7 kg (191 lb 3 2 oz)   SpO2 95%   BMI 26 67 kg/m²        Physical Exam     Physical Exam   Constitutional: He appears well-developed and well-nourished  No distress  HENT:   Head: Normocephalic and atraumatic  Right Ear: Tympanic membrane normal    Left Ear: Tympanic membrane normal    Nose: Rhinorrhea present  Mouth/Throat: Uvula is midline and mucous membranes are normal  Posterior oropharyngeal erythema present  No posterior oropharyngeal edema  Cardiovascular: Normal rate, regular rhythm and normal heart sounds  No murmur heard    Pulmonary/Chest: Effort normal and breath sounds normal

## 2018-09-22 NOTE — PATIENT INSTRUCTIONS
1  Motrin as needed for throat pain  2  Push fluids  3  Take Tessalon 1 every 8 hours as needed for cough  4   Follow up with PCP in 3-5 days if symptoms persist

## 2018-10-10 ENCOUNTER — OFFICE VISIT (OUTPATIENT)
Dept: OBGYN CLINIC | Facility: OTHER | Age: 68
End: 2018-10-10
Payer: COMMERCIAL

## 2018-10-10 VITALS
SYSTOLIC BLOOD PRESSURE: 123 MMHG | WEIGHT: 190 LBS | BODY MASS INDEX: 26.6 KG/M2 | DIASTOLIC BLOOD PRESSURE: 83 MMHG | HEIGHT: 71 IN | HEART RATE: 89 BPM

## 2018-10-10 DIAGNOSIS — M17.12 PRIMARY OSTEOARTHRITIS OF LEFT KNEE: Primary | ICD-10-CM

## 2018-10-10 DIAGNOSIS — M19.011 ARTHRITIS OF RIGHT GLENOHUMERAL JOINT: ICD-10-CM

## 2018-10-10 PROCEDURE — 20610 DRAIN/INJ JOINT/BURSA W/O US: CPT | Performed by: ORTHOPAEDIC SURGERY

## 2018-10-10 PROCEDURE — 99213 OFFICE O/P EST LOW 20 MIN: CPT | Performed by: ORTHOPAEDIC SURGERY

## 2018-10-10 PROCEDURE — 4040F PNEUMOC VAC/ADMIN/RCVD: CPT | Performed by: ORTHOPAEDIC SURGERY

## 2018-10-10 RX ORDER — METHYLPREDNISOLONE ACETATE 40 MG/ML
2 INJECTION, SUSPENSION INTRA-ARTICULAR; INTRALESIONAL; INTRAMUSCULAR; SOFT TISSUE
Status: COMPLETED | OUTPATIENT
Start: 2018-10-10 | End: 2018-10-10

## 2018-10-10 RX ORDER — BUPIVACAINE HYDROCHLORIDE 2.5 MG/ML
4 INJECTION, SOLUTION INFILTRATION; PERINEURAL
Status: COMPLETED | OUTPATIENT
Start: 2018-10-10 | End: 2018-10-10

## 2018-10-10 RX ORDER — METHYLPREDNISOLONE ACETATE 40 MG/ML
1 INJECTION, SUSPENSION INTRA-ARTICULAR; INTRALESIONAL; INTRAMUSCULAR; SOFT TISSUE
Status: COMPLETED | OUTPATIENT
Start: 2018-10-10 | End: 2018-10-10

## 2018-10-10 RX ADMIN — BUPIVACAINE HYDROCHLORIDE 4 ML: 2.5 INJECTION, SOLUTION INFILTRATION; PERINEURAL at 11:32

## 2018-10-10 RX ADMIN — BUPIVACAINE HYDROCHLORIDE 4 ML: 2.5 INJECTION, SOLUTION INFILTRATION; PERINEURAL at 11:31

## 2018-10-10 RX ADMIN — METHYLPREDNISOLONE ACETATE 1 ML: 40 INJECTION, SUSPENSION INTRA-ARTICULAR; INTRALESIONAL; INTRAMUSCULAR; SOFT TISSUE at 11:32

## 2018-10-10 RX ADMIN — METHYLPREDNISOLONE ACETATE 2 ML: 40 INJECTION, SUSPENSION INTRA-ARTICULAR; INTRALESIONAL; INTRAMUSCULAR; SOFT TISSUE at 11:31

## 2018-10-10 NOTE — PROGRESS NOTES
Orthopaedic Surgery - Office Note  Jaclyn Centeno (28 y o  male)   : 1950   MRN: [id number]  Encounter Date: 10/10/2018    Chief Complaint   Patient presents with    Left Knee - Follow-up    Right Shoulder - Follow-up       Assessment / Plan  Left knee DJD with degenerative medial meniscus tearing chronic PCL injury    · Corticosteroid injection given into the left knee  · Precatheterization for Synvisc-One ordered today  · Follow-up in 1 month for injection of viscous supplement left knee    Right shoulder glenohumeral osteoarthritis  · Glenohumeral injection corticosteroid was administered today  · Follow-up in 3 months    History of Present Illness  Jaclyn Centeno is a 76 y o  male who presents for follow-up with his right shoulder and left knee pain  He describes good pain relief with right shoulder glenohumeral injections  He is unsure whether not corticosteroid injections benefit his left knee  Pain is worse with activity and is relieved with rest   Pain does not radiate  Pain is not associated with any numbness or tingling  Review of Systems  Pertinent items are noted in HPI  All other systems were reviewed and are negative  Physical Exam  /83   Pulse 89   Ht 5' 11" (1 803 m)   Wt 86 2 kg (190 lb)   BMI 26 50 kg/m²   Cons: Appears well  No apparent distress  Psych: Alert  Oriented x3  Mood and affect normal   Eyes: PERRLA, EOMI  Resp: Normal effort  No audible wheezing or stridor  CV: Palpable pulse  No discernable arrhythmia  No LE edema  Lymph:  No palpable cervical, axillary, or inguinal lymphadenopathy  Skin: Warm  No palpable masses  No visible lesions  Neuro: Normal muscle tone  Normal and symmetric DTR's  Left Knee Exam  Alignment:  Normal limb alignment  Inspection:  There is no effusion  Palpation:  No tenderness  ROM:  0° to 125°  Strength:  5/5 quad and hamstrings  Stability:  No objective knee instability   Stable Varus / Valgus stress, Lachman, and Posterior drawer  Tests:  (-) Addie  Patella:  Patella tracks centrally without crepitus  Neurovascular:  Sensation intact distally  Extremity warm and well perfused  Gait:  Normal     Right Shoulder Exam  Alignment / Posture:  Normal cervical alignment  Inspection:  No swelling  No muscle atrophy  Palpation:  Tenderness to palpation posterior glenohumeral  ROM:  Shoulder   Shoulder ER 30  Shoulder IR T10  Strength:  5/5 supra,infra spinatous and subscapularis  Stability:  No objective shoulder instability  Tests: No pertinent positive or negative tests  Neurovascular:  Sensation intact distally   Fingers warm and well perfused    Studies Reviewed  Old x-rays right knee and MRI reviewed personally today and PACS    Large joint arthrocentesis  Date/Time: 10/10/2018 11:31 AM  Consent given by: patient  Site marked: site marked  Timeout: Immediately prior to procedure a time out was called to verify the correct patient, procedure, equipment, support staff and site/side marked as required   Supporting Documentation  Indications: pain   Procedure Details  Location: knee - L knee  Preparation: Patient was prepped and draped in the usual sterile fashion  Needle size: 22 G  Ultrasound guidance: no  Approach: superolateral   Medications administered: 4 mL bupivacaine 0 25 %; 2 mL methylPREDNISolone acetate 40 mg/mL    Patient tolerance: patient tolerated the procedure well with no immediate complications  Dressing:  Sterile dressing applied  Large joint arthrocentesis  Date/Time: 10/10/2018 11:32 AM  Consent given by: patient  Site marked: site marked  Timeout: Immediately prior to procedure a time out was called to verify the correct patient, procedure, equipment, support staff and site/side marked as required   Supporting Documentation  Indications: pain   Procedure Details  Location: shoulder - R glenohumeral  Preparation: Patient was prepped and draped in the usual sterile fashion  Needle size: 22 G  Ultrasound guidance: no  Approach: posterolateral  Medications administered: 4 mL bupivacaine 0 25 %; 1 mL methylPREDNISolone acetate 40 mg/mL    Patient tolerance: patient tolerated the procedure well with no immediate complications  Dressing:  Sterile dressing applied           Medical, Surgical, Family, and Social History  The patient's medical history, family history, and social history, were reviewed and updated as appropriate  Past Medical History:   Diagnosis Date    Disease of thyroid gland     last assessed 10/13/2016    History of radiation therapy     Impression 02/18/2016, of thymus as an infant    Primary osteoarthritis of left knee     last assessed 04/13/2016       History reviewed  No pertinent surgical history  Family History   Problem Relation Age of Onset    Heart disease Father     Heart failure Father     Dementia Mother        Social History     Occupational History    Not on file       Social History Main Topics    Smoking status: Never Smoker    Smokeless tobacco: Never Used    Alcohol use Yes      Comment: occasional    Drug use: Unknown    Sexual activity: Not on file       No Known Allergies      Current Outpatient Prescriptions:     benzonatate (TESSALON) 200 MG capsule, Take 1 capsule (200 mg total) by mouth 3 (three) times a day as needed for cough, Disp: 20 capsule, Rfl: 0    Misc Natural Products (JOINT SUPPORT PO), Take by mouth, Disp: , Rfl:     naproxen (NAPROSYN) 500 mg tablet, TAKE 1 TABLET BY MOUTH TWICE A DAY WITH MEALS, Disp: 60 tablet, Rfl: 0    Saw Palmetto 1000 MG CAPS, Take by mouth, Disp: , Rfl:     TURMERIC PO, Take by mouth, Disp: , Rfl:     thyroid (ARMOUR) 32 5 MG tablet, Take 1 tablet (32 5 mg total) by mouth daily (Patient not taking: Reported on 10/10/2018 ), Disp: 90 tablet, Rfl: 0      Keenan Villanueva    I,:    am acting as a scribe while in the presence of the attending physician :        I,:    personally performed the services described in this documentation    as scribed in my presence :

## 2018-10-18 ENCOUNTER — TELEPHONE (OUTPATIENT)
Dept: FAMILY MEDICINE CLINIC | Facility: CLINIC | Age: 68
End: 2018-10-18

## 2018-10-18 DIAGNOSIS — E03.9 ACQUIRED HYPOTHYROIDISM: Primary | ICD-10-CM

## 2018-11-26 ENCOUNTER — APPOINTMENT (OUTPATIENT)
Dept: LAB | Facility: MEDICAL CENTER | Age: 68
End: 2018-11-26
Payer: COMMERCIAL

## 2018-11-26 DIAGNOSIS — E03.9 ACQUIRED HYPOTHYROIDISM: ICD-10-CM

## 2018-11-26 LAB
ALBUMIN SERPL BCP-MCNC: 3.9 G/DL (ref 3.5–5)
ALP SERPL-CCNC: 56 U/L (ref 46–116)
ALT SERPL W P-5'-P-CCNC: 28 U/L (ref 12–78)
ANION GAP SERPL CALCULATED.3IONS-SCNC: 4 MMOL/L (ref 4–13)
AST SERPL W P-5'-P-CCNC: 20 U/L (ref 5–45)
BASOPHILS # BLD AUTO: 0.04 THOUSANDS/ΜL (ref 0–0.1)
BASOPHILS NFR BLD AUTO: 1 % (ref 0–1)
BILIRUB SERPL-MCNC: 0.61 MG/DL (ref 0.2–1)
BUN SERPL-MCNC: 18 MG/DL (ref 5–25)
CALCIUM SERPL-MCNC: 9.6 MG/DL (ref 8.3–10.1)
CHLORIDE SERPL-SCNC: 104 MMOL/L (ref 100–108)
CO2 SERPL-SCNC: 26 MMOL/L (ref 21–32)
CREAT SERPL-MCNC: 0.96 MG/DL (ref 0.6–1.3)
EOSINOPHIL # BLD AUTO: 0.25 THOUSAND/ΜL (ref 0–0.61)
EOSINOPHIL NFR BLD AUTO: 4 % (ref 0–6)
ERYTHROCYTE [DISTWIDTH] IN BLOOD BY AUTOMATED COUNT: 12.7 % (ref 11.6–15.1)
GFR SERPL CREATININE-BSD FRML MDRD: 81 ML/MIN/1.73SQ M
GLUCOSE P FAST SERPL-MCNC: 98 MG/DL (ref 65–99)
HCT VFR BLD AUTO: 46.1 % (ref 36.5–49.3)
HGB BLD-MCNC: 15.7 G/DL (ref 12–17)
IMM GRANULOCYTES # BLD AUTO: 0.01 THOUSAND/UL (ref 0–0.2)
IMM GRANULOCYTES NFR BLD AUTO: 0 % (ref 0–2)
LYMPHOCYTES # BLD AUTO: 1.35 THOUSANDS/ΜL (ref 0.6–4.47)
LYMPHOCYTES NFR BLD AUTO: 24 % (ref 14–44)
MCH RBC QN AUTO: 32.7 PG (ref 26.8–34.3)
MCHC RBC AUTO-ENTMCNC: 34.1 G/DL (ref 31.4–37.4)
MCV RBC AUTO: 96 FL (ref 82–98)
MONOCYTES # BLD AUTO: 0.55 THOUSAND/ΜL (ref 0.17–1.22)
MONOCYTES NFR BLD AUTO: 10 % (ref 4–12)
NEUTROPHILS # BLD AUTO: 3.55 THOUSANDS/ΜL (ref 1.85–7.62)
NEUTS SEG NFR BLD AUTO: 61 % (ref 43–75)
NRBC BLD AUTO-RTO: 0 /100 WBCS
PLATELET # BLD AUTO: 149 THOUSANDS/UL (ref 149–390)
PMV BLD AUTO: 11.2 FL (ref 8.9–12.7)
POTASSIUM SERPL-SCNC: 4.2 MMOL/L (ref 3.5–5.3)
PROT SERPL-MCNC: 7.3 G/DL (ref 6.4–8.2)
RBC # BLD AUTO: 4.8 MILLION/UL (ref 3.88–5.62)
SODIUM SERPL-SCNC: 134 MMOL/L (ref 136–145)
TSH SERPL DL<=0.05 MIU/L-ACNC: 3.51 UIU/ML (ref 0.36–3.74)
WBC # BLD AUTO: 5.75 THOUSAND/UL (ref 4.31–10.16)

## 2018-11-26 PROCEDURE — 36415 COLL VENOUS BLD VENIPUNCTURE: CPT

## 2018-11-26 PROCEDURE — 85025 COMPLETE CBC W/AUTO DIFF WBC: CPT

## 2018-11-26 PROCEDURE — 84443 ASSAY THYROID STIM HORMONE: CPT

## 2018-11-26 PROCEDURE — 80053 COMPREHEN METABOLIC PANEL: CPT

## 2019-01-16 ENCOUNTER — OFFICE VISIT (OUTPATIENT)
Dept: OBGYN CLINIC | Facility: OTHER | Age: 69
End: 2019-01-16
Payer: COMMERCIAL

## 2019-01-16 VITALS — DIASTOLIC BLOOD PRESSURE: 88 MMHG | SYSTOLIC BLOOD PRESSURE: 136 MMHG | HEART RATE: 78 BPM

## 2019-01-16 DIAGNOSIS — M19.011 GLENOHUMERAL ARTHRITIS, RIGHT: Primary | ICD-10-CM

## 2019-01-16 DIAGNOSIS — M17.12 PRIMARY OSTEOARTHRITIS OF LEFT KNEE: ICD-10-CM

## 2019-01-16 PROCEDURE — 99213 OFFICE O/P EST LOW 20 MIN: CPT | Performed by: ORTHOPAEDIC SURGERY

## 2019-01-16 PROCEDURE — 20610 DRAIN/INJ JOINT/BURSA W/O US: CPT | Performed by: ORTHOPAEDIC SURGERY

## 2019-01-16 RX ORDER — BUPIVACAINE HYDROCHLORIDE 2.5 MG/ML
4 INJECTION, SOLUTION INFILTRATION; PERINEURAL
Status: COMPLETED | OUTPATIENT
Start: 2019-01-16 | End: 2019-01-16

## 2019-01-16 RX ORDER — METHYLPREDNISOLONE ACETATE 40 MG/ML
1 INJECTION, SUSPENSION INTRA-ARTICULAR; INTRALESIONAL; INTRAMUSCULAR; SOFT TISSUE
Status: COMPLETED | OUTPATIENT
Start: 2019-01-16 | End: 2019-01-16

## 2019-01-16 RX ADMIN — METHYLPREDNISOLONE ACETATE 1 ML: 40 INJECTION, SUSPENSION INTRA-ARTICULAR; INTRALESIONAL; INTRAMUSCULAR; SOFT TISSUE at 16:11

## 2019-01-16 RX ADMIN — BUPIVACAINE HYDROCHLORIDE 4 ML: 2.5 INJECTION, SOLUTION INFILTRATION; PERINEURAL at 16:11

## 2019-01-16 NOTE — PROGRESS NOTES
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    · TKA was discussed with the patient  · We will continue conservative treatment for now  · He will let me know if the symptoms worsen    Right shoulder glenohumeral osteoarthritis  · Glenohumeral injection corticosteroid was administered today  · Follow-up in 3 months    History of Present Illness  Bryanna Rebolledo is a 76 y o  male who presents for follow-up with his right shoulder and left knee pain  He has had good pain relief with right shoulder glenohumeral injections  Pain is worse with activity and is relieved with rest   Pain does not radiate  Pain is not associated with any numbness or tingling  Review of Systems  Pertinent items are noted in HPI  All other systems were reviewed and are negative  Physical Exam  /88   Pulse 78   Cons: Appears well  No apparent distress  Psych: Alert  Oriented x3  Mood and affect normal   Eyes: PERRLA, EOMI  Resp: Normal effort  No audible wheezing or stridor  CV: Palpable pulse  No discernable arrhythmia  No LE edema  Lymph:  No palpable cervical, axillary, or inguinal lymphadenopathy  Skin: Warm  No palpable masses  No visible lesions  Neuro: Normal muscle tone  Normal and symmetric DTR's  Left Knee Exam  Alignment:  Normal limb alignment  Inspection:  There is no effusion  Palpation:  No tenderness  ROM:  0° to 125°  Strength:  5/5 quad and hamstrings  Stability:  No objective knee instability  Stable Varus / Valgus stress, Lachman, and Posterior drawer  Tests:  (-) Addie  Patella:  Patella tracks centrally without crepitus  Neurovascular:  Sensation intact distally   Extremity warm and well perfused  Gait:  Normal     Right Shoulder Exam  Alignment / Posture:  Normal cervical alignment  Inspection:  No swelling  No muscle atrophy  Palpation:  Tenderness to palpation posterior glenohumeral  ROM:  Shoulder   Shoulder ER 30  Shoulder IR T10  Strength:  5/5 supra,infra spinatous and subscapularis  Stability:  No objective shoulder instability  Tests: No pertinent positive or negative tests  Neurovascular:  Sensation intact distally  Fingers warm and well perfused    Studies Reviewed  No studies to review today    Large joint arthrocentesis  Date/Time: 1/16/2019 4:11 PM  Consent given by: patient  Supporting Documentation  Indications: pain   Procedure Details  Location: shoulder - R glenohumeral  Preparation: Alcohol  Needle size: 22 G  Approach: anterior  Medications administered: 4 mL bupivacaine 0 25 %; 1 mL methylPREDNISolone acetate 40 mg/mL    Patient tolerance: patient tolerated the procedure well with no immediate complications  Dressing:  Sterile dressing applied           Medical, Surgical, Family, and Social History  The patient's medical history, family history, and social history, were reviewed and updated as appropriate  Past Medical History:   Diagnosis Date    Disease of thyroid gland     last assessed 10/13/2016    History of radiation therapy     Impression 02/18/2016, of thymus as an infant    Primary osteoarthritis of left knee     last assessed 04/13/2016       History reviewed  No pertinent surgical history  Family History   Problem Relation Age of Onset    Heart disease Father     Heart failure Father     Dementia Mother        Social History     Occupational History    Not on file       Social History Main Topics    Smoking status: Never Smoker    Smokeless tobacco: Never Used    Alcohol use Yes      Comment: occasional    Drug use: Unknown    Sexual activity: Not on file       No Known Allergies      Current Outpatient Prescriptions:     benzonatate (TESSALON) 200 MG capsule, Take 1 capsule (200 mg total) by mouth 3 (three) times a day as needed for cough, Disp: 20 capsule, Rfl: 0    Misc Natural Products (JOINT SUPPORT PO), Take by mouth, Disp: , Rfl:     naproxen (NAPROSYN) 500 mg tablet, TAKE 1 TABLET BY MOUTH TWICE A DAY WITH MEALS, Disp: 60 tablet, Rfl: 0    Saw [place] 1000 MG CAPS, Take by mouth, Disp: , Rfl:     thyroid (ARMOUR) 32 5 MG tablet, Take 1 tablet (32 5 mg total) by mouth daily (Patient not taking: Reported on 10/10/2018 ), Disp: 90 tablet, Rfl: 0    TURMERIC PO, Take by mouth, Disp: , Rfl:       Hortencia Cavanaugh MD    Scribe Attestation    I,:    am acting as a scribe while in the presence of the attending physician :        I,:    personally performed the services described in this documentation    as scribed in my presence :

## 2019-01-28 ENCOUNTER — OFFICE VISIT (OUTPATIENT)
Dept: FAMILY MEDICINE CLINIC | Facility: CLINIC | Age: 69
End: 2019-01-28
Payer: COMMERCIAL

## 2019-01-28 VITALS
TEMPERATURE: 97.9 F | WEIGHT: 197 LBS | HEIGHT: 71 IN | HEART RATE: 76 BPM | OXYGEN SATURATION: 97 % | SYSTOLIC BLOOD PRESSURE: 122 MMHG | DIASTOLIC BLOOD PRESSURE: 78 MMHG | RESPIRATION RATE: 16 BRPM | BODY MASS INDEX: 27.58 KG/M2

## 2019-01-28 DIAGNOSIS — R73.9 ELEVATED BLOOD SUGAR LEVEL: ICD-10-CM

## 2019-01-28 DIAGNOSIS — M17.12 PRIMARY OSTEOARTHRITIS OF LEFT KNEE: ICD-10-CM

## 2019-01-28 DIAGNOSIS — E03.9 ACQUIRED HYPOTHYROIDISM: ICD-10-CM

## 2019-01-28 DIAGNOSIS — Z00.00 MEDICARE ANNUAL WELLNESS VISIT, SUBSEQUENT: Primary | ICD-10-CM

## 2019-01-28 DIAGNOSIS — R97.20 ELEVATED PROSTATE SPECIFIC ANTIGEN (PSA): ICD-10-CM

## 2019-01-28 PROBLEM — R19.7 DIARRHEA OF PRESUMED INFECTIOUS ORIGIN: Status: RESOLVED | Noted: 2018-06-06 | Resolved: 2019-01-28

## 2019-01-28 PROCEDURE — 99214 OFFICE O/P EST MOD 30 MIN: CPT | Performed by: FAMILY MEDICINE

## 2019-01-28 PROCEDURE — G0439 PPPS, SUBSEQ VISIT: HCPCS | Performed by: FAMILY MEDICINE

## 2019-01-28 PROCEDURE — 1160F RVW MEDS BY RX/DR IN RCRD: CPT | Performed by: FAMILY MEDICINE

## 2019-01-28 PROCEDURE — 1170F FXNL STATUS ASSESSED: CPT | Performed by: FAMILY MEDICINE

## 2019-01-28 PROCEDURE — 3008F BODY MASS INDEX DOCD: CPT | Performed by: FAMILY MEDICINE

## 2019-01-28 PROCEDURE — 1125F AMNT PAIN NOTED PAIN PRSNT: CPT | Performed by: FAMILY MEDICINE

## 2019-01-28 RX ORDER — CHLORAL HYDRATE 500 MG
1000 CAPSULE ORAL DAILY
COMMUNITY
End: 2019-12-24 | Stop reason: SDUPTHER

## 2019-01-28 RX ORDER — ASPIRIN 81 MG/1
81 TABLET ORAL DAILY
COMMUNITY
End: 2019-09-17

## 2019-01-28 NOTE — PROGRESS NOTES
Assessment and Plan:    Problem List Items Addressed This Visit     None      Visit Diagnoses     Medicare annual wellness visit, subsequent    -  Primary        Health Maintenance Due   Topic Date Due    PT PLAN OF CARE  04/26/2018         HPI:  Nella Feliz is a 76 y o  male here for his Subsequent Wellness Visit  Patient Active Problem List   Diagnosis    Pain in left knee    Glenohumeral arthritis, right    Primary osteoarthritis of left knee    Elevated blood sugar level    Elevated prostate specific antigen (PSA)    Diarrhea of presumed infectious origin     Past Medical History:   Diagnosis Date    Disease of thyroid gland     last assessed 10/13/2016    History of radiation therapy     Impression 02/18/2016, of thymus as an infant    Primary osteoarthritis of left knee     last assessed 04/13/2016     No past surgical history on file    Family History   Problem Relation Age of Onset    Heart disease Father     Heart failure Father     Dementia Mother      History   Smoking Status    Never Smoker   Smokeless Tobacco    Never Used     History   Alcohol Use    Yes     Comment: occasional      History   Drug use: Unknown       Current Outpatient Prescriptions   Medication Sig Dispense Refill    Arginine 1000 MG TABS Take by mouth      aspirin (ECOTRIN LOW STRENGTH) 81 mg EC tablet Take 81 mg by mouth daily      benzonatate (TESSALON) 200 MG capsule Take 1 capsule (200 mg total) by mouth 3 (three) times a day as needed for cough 20 capsule 0    Misc Natural Products (JOINT SUPPORT PO) Take by mouth      naproxen (NAPROSYN) 500 mg tablet TAKE 1 TABLET BY MOUTH TWICE A DAY WITH MEALS 60 tablet 0    Omega-3 Fatty Acids (FISH OIL) 1,000 mg Take 1,000 mg by mouth daily      Saw [place] 1000 MG CAPS Take by mouth      thyroid (ARMOUR) 32 5 MG tablet Take 1 tablet (32 5 mg total) by mouth daily (Patient not taking: Reported on 10/10/2018 ) 90 tablet 0    TURMERIC PO Take by mouth       No current facility-administered medications for this visit  No Known Allergies  Immunization History   Administered Date(s) Administered    Influenza 11/04/2010, 10/03/2011, 10/20/2012, 10/03/2013, 10/15/2014, 09/19/2015, 10/13/2016, 09/20/2017, 09/16/2018    Influenza Split High Dose Preservative Free IM 09/19/2015, 10/13/2016    Pneumococcal Conjugate 13-Valent 07/29/2016, 09/01/2017    Pneumococcal Polysaccharide PPV23 07/13/2016, 09/24/2017    Tdap 04/20/2011    Zoster 01/20/2011       Patient Care Team:  Vidhya Mixon MD as PCP - General  Chace Larios PA-C    Medicare Screening Tests and Risk Assessments:  Last Medicare Wellness visit information reviewed, patient interviewed and updates made to the record today  Health Risk Assessment:  Patient rates overall health as very good  Patient feels that their physical health rating is Same  Eyesight was rated as Same  Hearing was rated as Same  Patient feels that their emotional and mental health rating is Same  Pain experienced by patient in the last 7 days has been None  Patient states that he has experienced no weight loss or gain in last 6 months  Emotional/Mental Health:  Patient has been feeling nervous/anxious  PHQ-9 Depression Screening:    Frequency of the following problems over the past two weeks:      1  Little interest or pleasure in doing things: 0 - not at all      2  Feeling down, depressed, or hopeless: 0 - not at all  PHQ-2 Score: 0          Broken Bones/Falls: Fall Risk Assessment:    In the past year, patient has experienced: No history of falling in past year          Bladder/Bowel:  Patient has not leaked urine accidently in the last six months  Patient reports no loss of bowel control  Immunizations:  Patient has had a flu vaccination within the last year  Patient has received a pneumonia shot  Patient has received a shingles shot  Patient has not received tetanus/diphtheria shot       Home Safety:  Patient does not have trouble with stairs inside or outside of their home  Patient currently reports that there are no safety hazards present in home, working smoke alarms, no working carbon monoxide detectors  Preventative Screenings:   prostate cancer screen performed, colon cancer screen completed, cholesterol screen completed, glaucoma eye exam completed, (Additional Comments: Colon: Dr Petros Isbell: [facility])    Nutrition:  Current diet: Regular with servings of the following:    Medications:  Patient is currently taking over-the-counter supplements  Patient is able to manage medications  Lifestyle Choices:  Patient reports no tobacco use  Patient has not smoked or used tobacco in the past   Patient has stopped his tobacco use  Patient reports alcohol use  Patient drives a vehicle  Patient wears seat belt  Activities of Daily Living:  Can get out of bed by his or her self, able to dress self, able to make own meals, able to do own shopping, able to bathe self, can do own laundry/housekeeping, can manage own money, pay bills and track expenses    Previous Hospitalizations:  No hospitalization or ED visit in past 12 months        Advanced Directives:  Patient has decided on a power of   Patient has spoken to designated power of   Patient has completed advanced directive          Preventative Screening/Counseling:      Cardiovascular:      General: Screening Current          Diabetes:      General: Screening Current          Colorectal Cancer:      General: Screening Current          Prostate Cancer:      General: Screening Current          Osteoporosis:      General: Screening Not Indicated          AAA:      General: Screening Not Indicated          Glaucoma:      General: Screening Current          HIV:      General: Screening Not Indicated          Hepatitis C:      General: Screening Not Indicated        Advanced Directives:   Patient has living will for healthcare, has durable POA for healthcare, patient has an advanced directive  Immunizations:      Influenza: Influenza UTD This Year and Influenza Recommended Annually      Pneumococcal: Lifetime Vaccine Completed            Assessment and Plan:    Problem List Items Addressed This Visit     None      Visit Diagnoses     Medicare annual wellness visit, subsequent    -  Primary        Health Maintenance Due   Topic Date Due    PT PLAN OF CARE  04/26/2018         HPI:  Magdalena Martin is a 76 y o  male here for his Subsequent Wellness Visit  Patient Active Problem List   Diagnosis    Pain in left knee    Glenohumeral arthritis, right    Primary osteoarthritis of left knee    Elevated blood sugar level    Elevated prostate specific antigen (PSA)    Diarrhea of presumed infectious origin     Past Medical History:   Diagnosis Date    Disease of thyroid gland     last assessed 10/13/2016    History of radiation therapy     Impression 02/18/2016, of thymus as an infant    Primary osteoarthritis of left knee     last assessed 04/13/2016     No past surgical history on file    Family History   Problem Relation Age of Onset    Heart disease Father     Heart failure Father     Dementia Mother      History   Smoking Status    Never Smoker   Smokeless Tobacco    Never Used     History   Alcohol Use    Yes     Comment: occasional      History   Drug use: Unknown       Current Outpatient Prescriptions   Medication Sig Dispense Refill    Arginine 1000 MG TABS Take by mouth      aspirin (ECOTRIN LOW STRENGTH) 81 mg EC tablet Take 81 mg by mouth daily      benzonatate (TESSALON) 200 MG capsule Take 1 capsule (200 mg total) by mouth 3 (three) times a day as needed for cough 20 capsule 0    Misc Natural Products (JOINT SUPPORT PO) Take by mouth      naproxen (NAPROSYN) 500 mg tablet TAKE 1 TABLET BY MOUTH TWICE A DAY WITH MEALS 60 tablet 0    Omega-3 Fatty Acids (FISH OIL) 1,000 mg Take 1,000 mg by mouth daily      Saw [place] 1000 MG CAPS Take by mouth      thyroid (ARMOUR) 32 5 MG tablet Take 1 tablet (32 5 mg total) by mouth daily (Patient not taking: Reported on 10/10/2018 ) 90 tablet 0    TURMERIC PO Take by mouth       No current facility-administered medications for this visit        No Known Allergies  Immunization History   Administered Date(s) Administered    Influenza 2010, 10/03/2011, 10/20/2012, 10/03/2013, 10/15/2014, 2015, 10/13/2016, 2017, 2018    Influenza Split High Dose Preservative Free IM 2015, 10/13/2016    Pneumococcal Conjugate 13-Valent 2016, 2017    Pneumococcal Polysaccharide PPV23 2016, 2017    Tdap 2011    Zoster 2011       Patient Care Team:  Betty Morel MD as PCP - General  Alphonso Betancourt PA-C    Medicare Screening Tests and Risk Assessments:  Medicare Annual Wellness Visit

## 2019-01-31 DIAGNOSIS — M17.0 PRIMARY OSTEOARTHRITIS OF BOTH KNEES: ICD-10-CM

## 2019-01-31 RX ORDER — NAPROXEN 500 MG/1
TABLET ORAL
Qty: 60 TABLET | Refills: 0 | Status: SHIPPED | OUTPATIENT
Start: 2019-01-31 | End: 2019-03-02 | Stop reason: SDUPTHER

## 2019-03-02 DIAGNOSIS — M17.0 PRIMARY OSTEOARTHRITIS OF BOTH KNEES: ICD-10-CM

## 2019-03-04 RX ORDER — NAPROXEN 500 MG/1
TABLET ORAL
Qty: 60 TABLET | Refills: 0 | Status: SHIPPED | OUTPATIENT
Start: 2019-03-04 | End: 2019-09-17

## 2019-04-04 ENCOUNTER — TELEPHONE (OUTPATIENT)
Dept: OBGYN CLINIC | Facility: HOSPITAL | Age: 69
End: 2019-04-04

## 2019-04-29 DIAGNOSIS — Z01.84 IMMUNITY STATUS TESTING: Primary | ICD-10-CM

## 2019-05-29 ENCOUNTER — APPOINTMENT (OUTPATIENT)
Dept: LAB | Facility: MEDICAL CENTER | Age: 69
End: 2019-05-29
Payer: COMMERCIAL

## 2019-05-29 DIAGNOSIS — E03.9 ACQUIRED HYPOTHYROIDISM: ICD-10-CM

## 2019-05-29 DIAGNOSIS — R97.20 ELEVATED PROSTATE SPECIFIC ANTIGEN (PSA): ICD-10-CM

## 2019-05-29 DIAGNOSIS — R73.9 ELEVATED BLOOD SUGAR LEVEL: ICD-10-CM

## 2019-05-29 DIAGNOSIS — Z01.84 IMMUNITY STATUS TESTING: ICD-10-CM

## 2019-05-29 LAB
ALBUMIN SERPL BCP-MCNC: 3.8 G/DL (ref 3.5–5)
ALP SERPL-CCNC: 56 U/L (ref 46–116)
ALT SERPL W P-5'-P-CCNC: 31 U/L (ref 12–78)
ANION GAP SERPL CALCULATED.3IONS-SCNC: 10 MMOL/L (ref 4–13)
AST SERPL W P-5'-P-CCNC: 21 U/L (ref 5–45)
BASOPHILS # BLD AUTO: 0.04 THOUSANDS/ΜL (ref 0–0.1)
BASOPHILS NFR BLD AUTO: 1 % (ref 0–1)
BILIRUB SERPL-MCNC: 0.5 MG/DL (ref 0.2–1)
BUN SERPL-MCNC: 17 MG/DL (ref 5–25)
CALCIUM SERPL-MCNC: 9.3 MG/DL (ref 8.3–10.1)
CHLORIDE SERPL-SCNC: 105 MMOL/L (ref 100–108)
CO2 SERPL-SCNC: 26 MMOL/L (ref 21–32)
CREAT SERPL-MCNC: 0.84 MG/DL (ref 0.6–1.3)
EOSINOPHIL # BLD AUTO: 0.19 THOUSAND/ΜL (ref 0–0.61)
EOSINOPHIL NFR BLD AUTO: 3 % (ref 0–6)
ERYTHROCYTE [DISTWIDTH] IN BLOOD BY AUTOMATED COUNT: 12.8 % (ref 11.6–15.1)
EST. AVERAGE GLUCOSE BLD GHB EST-MCNC: 108 MG/DL
GFR SERPL CREATININE-BSD FRML MDRD: 90 ML/MIN/1.73SQ M
GLUCOSE P FAST SERPL-MCNC: 103 MG/DL (ref 65–99)
HBA1C MFR BLD: 5.4 % (ref 4.2–6.3)
HCT VFR BLD AUTO: 45.7 % (ref 36.5–49.3)
HGB BLD-MCNC: 15.6 G/DL (ref 12–17)
IMM GRANULOCYTES # BLD AUTO: 0.02 THOUSAND/UL (ref 0–0.2)
IMM GRANULOCYTES NFR BLD AUTO: 0 % (ref 0–2)
LYMPHOCYTES # BLD AUTO: 1.27 THOUSANDS/ΜL (ref 0.6–4.47)
LYMPHOCYTES NFR BLD AUTO: 23 % (ref 14–44)
MCH RBC QN AUTO: 32.8 PG (ref 26.8–34.3)
MCHC RBC AUTO-ENTMCNC: 34.1 G/DL (ref 31.4–37.4)
MCV RBC AUTO: 96 FL (ref 82–98)
MONOCYTES # BLD AUTO: 0.52 THOUSAND/ΜL (ref 0.17–1.22)
MONOCYTES NFR BLD AUTO: 9 % (ref 4–12)
NEUTROPHILS # BLD AUTO: 3.6 THOUSANDS/ΜL (ref 1.85–7.62)
NEUTS SEG NFR BLD AUTO: 64 % (ref 43–75)
NRBC BLD AUTO-RTO: 0 /100 WBCS
PMV BLD AUTO: 11.4 FL (ref 8.9–12.7)
POTASSIUM SERPL-SCNC: 4.5 MMOL/L (ref 3.5–5.3)
PROT SERPL-MCNC: 7.3 G/DL (ref 6.4–8.2)
PSA SERPL-MCNC: 9.4 NG/ML (ref 0–4)
RBC # BLD AUTO: 4.76 MILLION/UL (ref 3.88–5.62)
RUBV IGG SERPL IA-ACNC: >175 IU/ML
SODIUM SERPL-SCNC: 141 MMOL/L (ref 136–145)
TSH SERPL DL<=0.05 MIU/L-ACNC: 4.25 UIU/ML (ref 0.36–3.74)
WBC # BLD AUTO: 5.64 THOUSAND/UL (ref 4.31–10.16)

## 2019-05-29 PROCEDURE — 80053 COMPREHEN METABOLIC PANEL: CPT

## 2019-05-29 PROCEDURE — 86765 RUBEOLA ANTIBODY: CPT

## 2019-05-29 PROCEDURE — G0103 PSA SCREENING: HCPCS

## 2019-05-29 PROCEDURE — 85025 COMPLETE CBC W/AUTO DIFF WBC: CPT

## 2019-05-29 PROCEDURE — 83036 HEMOGLOBIN GLYCOSYLATED A1C: CPT

## 2019-05-29 PROCEDURE — 86735 MUMPS ANTIBODY: CPT

## 2019-05-29 PROCEDURE — 36415 COLL VENOUS BLD VENIPUNCTURE: CPT

## 2019-05-29 PROCEDURE — 84443 ASSAY THYROID STIM HORMONE: CPT

## 2019-05-29 PROCEDURE — 86762 RUBELLA ANTIBODY: CPT

## 2019-05-30 LAB
MEV IGG SER QL: NORMAL
MUV IGG SER QL: NORMAL

## 2019-06-02 DIAGNOSIS — R97.20 ELEVATED PROSTATE SPECIFIC ANTIGEN (PSA): Primary | ICD-10-CM

## 2019-08-16 NOTE — PROGRESS NOTES
Assessment/Plan:         Diagnoses and all orders for this visit:    Medicare annual wellness visit, subsequent    Elevated prostate specific antigen (PSA)  -     PSA, Total Screen; Future    Elevated blood sugar level  -     Hemoglobin A1C; Future    Primary osteoarthritis of left knee    Acquired hypothyroidism  -     CBC and differential; Future  -     Comprehensive metabolic panel; Future  -     TSH, 3rd generation; Future    Other orders  -     aspirin (ECOTRIN LOW STRENGTH) 81 mg EC tablet; Take 81 mg by mouth daily  -     Omega-3 Fatty Acids (FISH OIL) 1,000 mg; Take 1,000 mg by mouth daily  -     Arginine 1000 MG TABS; Take by mouth          Subjective:      Patient ID: Keegan Bartholomew is a 76 y o  male  Was started on armour thyroid, developed diarrhea  Was found to have blastomycosis  Nevertheless has continue to stay off the thyroid med and feeling fine now  Diarrhea resolved  Had also been drinking raw milk at the time which he has also stopped,  Having L knee resurfacing replacement on 2/4 with [facility]  Was advised to start taking arginine, fish oil, ASA prior to surgery  BP well cotrolled  Exercises rgularly, TSH and PSA have been elevated overtime  a1C checked yearly has been under good control  The following portions of the patient's history were reviewed and updated as appropriate: allergies, current medications, past family history, past medical history, past social history, past surgical history and problem list     Review of Systems      Objective:      /78 (BP Location: Right arm, Patient Position: Sitting, Cuff Size: Standard)   Pulse 76   Temp 97 9 °F (36 6 °C)   Resp 16   Ht 5' 11" (1 803 m)   Wt 89 4 kg (197 lb)   SpO2 97%   BMI 27 48 kg/m²          Physical Exam   Constitutional: He is oriented to person, place, and time  He appears well-developed and well-nourished  Neck: No thyromegaly present  Cardiovascular: Normal rate, regular rhythm and normal heart sounds  Pulmonary/Chest: Effort normal and breath sounds normal    Lymphadenopathy:     He has no cervical adenopathy  Neurological: He is alert and oriented to person, place, and time  Skin: Skin is warm  Psychiatric: He has a normal mood and affect  His behavior is normal  Judgment and thought content normal    Vitals reviewed  Single

## 2019-09-17 ENCOUNTER — OFFICE VISIT (OUTPATIENT)
Dept: URGENT CARE | Facility: CLINIC | Age: 69
End: 2019-09-17
Payer: COMMERCIAL

## 2019-09-17 VITALS
TEMPERATURE: 97.8 F | OXYGEN SATURATION: 97 % | WEIGHT: 190 LBS | DIASTOLIC BLOOD PRESSURE: 68 MMHG | HEIGHT: 71 IN | HEART RATE: 84 BPM | SYSTOLIC BLOOD PRESSURE: 120 MMHG | RESPIRATION RATE: 18 BRPM | BODY MASS INDEX: 26.6 KG/M2

## 2019-09-17 DIAGNOSIS — S61.219A LACERATION WITHOUT FOREIGN BODY OF UNSPECIFIED FINGER WITHOUT DAMAGE TO NAIL, INITIAL ENCOUNTER: Primary | ICD-10-CM

## 2019-09-17 PROCEDURE — 99213 OFFICE O/P EST LOW 20 MIN: CPT | Performed by: PHYSICIAN ASSISTANT

## 2019-09-17 PROCEDURE — 90471 IMMUNIZATION ADMIN: CPT | Performed by: PHYSICIAN ASSISTANT

## 2019-09-17 PROCEDURE — S9083 URGENT CARE CENTER GLOBAL: HCPCS | Performed by: PHYSICIAN ASSISTANT

## 2019-09-17 PROCEDURE — 90715 TDAP VACCINE 7 YRS/> IM: CPT

## 2019-09-17 PROCEDURE — 12001 RPR S/N/AX/GEN/TRNK 2.5CM/<: CPT | Performed by: PHYSICIAN ASSISTANT

## 2019-09-17 NOTE — PROGRESS NOTES
[facility]        NAME: Latonya Marti is a 71 y o  male  : 1950    MRN: [id number]  DATE: 2019  TIME: 5:05 PM    Assessment and Plan   Laceration without foreign body of unspecified finger without damage to nail, initial encounter [S61 [id number]]  1  Laceration without foreign body of unspecified finger without damage to nail, initial encounter  TDAP Vaccine greater than or equal to 8yo         Patient Instructions     -5 sutures were placed  -Keep wound dry for 24 hours  You may then wash with soap and water and dry well  -Keep wound covered  -Use tylenol/motrin as directed for pain  -Have sutures removed in 7-10 days- you can return here or go to PCP    Go to ER with worsening symptoms, signs of infection (redness, fever, drainage, increased pain, etc ), or any new concerns    Chief Complaint     Chief Complaint   Patient presents with    Finger Laceration     cut L middle finger on hayde bolt around 1:00 this afternoon  unsure of TDAP status  History of Present Illness       Patient is a 43-year-old male who presents today for evaluation of a left 3rd finger laceration  Patient states that he cut his finger on a hayde bolt around 1:00 p m  This afternoon  Patient is unsure of his last tetanus shot  Review of Systems   Review of Systems   Constitutional: Negative for chills and fever  Respiratory: Negative for shortness of breath  Cardiovascular: Negative for chest pain  Skin: Positive for wound  Neurological: Negative for weakness and numbness  All other systems reviewed and are negative          Current Medications       Current Outpatient Medications:     Omega-3 Fatty Acids (FISH OIL) 1,000 mg, Take 1,000 mg by mouth daily, Disp: , Rfl:     Saw [place] 1000 MG CAPS, Take by mouth, Disp: , Rfl:     Current Allergies     Allergies as of 2019    (No Known Allergies)            The following portions of the patient's history were reviewed and updated as appropriate: allergies, current medications, past family history, past medical history, past social history, past surgical history and problem list      Past Medical History:   Diagnosis Date    Disease of thyroid gland     last assessed 10/13/2016    History of radiation therapy     Impression 02/18/2016, of thymus as an infant    Primary osteoarthritis of left knee     last assessed 04/13/2016       History reviewed  No pertinent surgical history  Family History   Problem Relation Age of Onset    Heart disease Father     Heart failure Father     Dementia Mother          Medications have been verified  Objective   /68 (BP Location: Left arm, Patient Position: Sitting)   Pulse 84   Temp 97 8 °F (36 6 °C) (Oral)   Resp 18   Ht 5' 11" (1 803 m)   Wt 86 2 kg (190 lb)   SpO2 97%   BMI 26 50 kg/m²        Physical Exam     Physical Exam   Constitutional: He is oriented to person, place, and time  He appears well-developed and well-nourished  No distress  Cardiovascular: Normal rate, regular rhythm and normal heart sounds  Pulmonary/Chest: Effort normal and breath sounds normal    Musculoskeletal:        Hands:  Neurological: He is alert and oriented to person, place, and time  He has normal strength  No sensory deficit  Skin: Skin is warm and dry  Capillary refill takes less than 2 seconds  Psychiatric: He has a normal mood and affect  Nursing note and vitals reviewed  Laceration repair  Date/Time: 9/17/2019 6:11 PM  Performed by: Faustino Ardon PA-C  Authorized by: Faustino Ardon PA-C   Consent: Verbal consent obtained    Consent given by: patient  Body area: upper extremity  Location details: left long finger  Laceration length: 1 cm  Foreign bodies: no foreign bodies  Tendon involvement: none  Nerve involvement: none  Vascular damage: no  Anesthesia: local infiltration    Anesthesia:  Local Anesthetic: lidocaine 2% without epinephrine  Anesthetic total: 1 mL    Wound Dehiscence:  Superficial Wound Dehiscence: simple closure      Procedure Details:  Preparation: Patient was prepped and draped in the usual sterile fashion    Irrigation solution: saline  Skin closure: 5-0 nylon  Number of sutures: 5  Technique: simple  Approximation: close  Approximation difficulty: simple  Dressing: antibiotic ointment and 4x4 sterile gauze  Patient tolerance: Patient tolerated the procedure well with no immediate complications

## 2019-09-17 NOTE — PATIENT INSTRUCTIONS
-5 sutures were placed  -Keep wound dry for 24 hours   You may then wash with soap and water and dry well  -Keep wound covered  -Use tylenol/motrin as directed for pain  -Have sutures removed in 7-10 days- you can return here or go to PCP    Go to ER with worsening symptoms, signs of infection (redness, fever, drainage, increased pain, etc ), or any new concerns

## 2019-09-25 ENCOUNTER — TELEPHONE (OUTPATIENT)
Dept: FAMILY MEDICINE CLINIC | Facility: CLINIC | Age: 69
End: 2019-09-25

## 2019-09-25 NOTE — TELEPHONE ENCOUNTER
Patient wife calling requesting nature throid be sent to the [facility] [place]

## 2019-09-25 NOTE — TELEPHONE ENCOUNTER
naturethroid no longer on his list, what dose is he at now or or we starting from nothing? Starting dose is usually about 30 or 32 5

## 2019-09-26 DIAGNOSIS — E03.9 ACQUIRED HYPOTHYROIDISM: Primary | ICD-10-CM

## 2019-11-19 ENCOUNTER — OFFICE VISIT (OUTPATIENT)
Dept: FAMILY MEDICINE CLINIC | Facility: CLINIC | Age: 69
End: 2019-11-19
Payer: COMMERCIAL

## 2019-11-19 VITALS
OXYGEN SATURATION: 96 % | BODY MASS INDEX: 27.02 KG/M2 | RESPIRATION RATE: 16 BRPM | SYSTOLIC BLOOD PRESSURE: 122 MMHG | HEIGHT: 71 IN | TEMPERATURE: 98.1 F | DIASTOLIC BLOOD PRESSURE: 78 MMHG | HEART RATE: 92 BPM | WEIGHT: 193 LBS

## 2019-11-19 DIAGNOSIS — J02.9 ACUTE PHARYNGITIS, UNSPECIFIED ETIOLOGY: Primary | ICD-10-CM

## 2019-11-19 PROBLEM — C61 PROSTATE CANCER (HCC): Status: ACTIVE | Noted: 2019-11-19

## 2019-11-19 LAB — S PYO AG THROAT QL: NEGATIVE

## 2019-11-19 PROCEDURE — 1036F TOBACCO NON-USER: CPT | Performed by: PHYSICIAN ASSISTANT

## 2019-11-19 PROCEDURE — 87880 STREP A ASSAY W/OPTIC: CPT | Performed by: PHYSICIAN ASSISTANT

## 2019-11-19 PROCEDURE — 1160F RVW MEDS BY RX/DR IN RCRD: CPT | Performed by: PHYSICIAN ASSISTANT

## 2019-11-19 PROCEDURE — 87070 CULTURE OTHR SPECIMN AEROBIC: CPT | Performed by: PHYSICIAN ASSISTANT

## 2019-11-19 PROCEDURE — 99213 OFFICE O/P EST LOW 20 MIN: CPT | Performed by: PHYSICIAN ASSISTANT

## 2019-11-19 RX ORDER — AMOXICILLIN 500 MG/1
500 CAPSULE ORAL EVERY 8 HOURS SCHEDULED
Qty: 30 CAPSULE | Refills: 0 | Status: SHIPPED | OUTPATIENT
Start: 2019-11-19 | End: 2019-11-29

## 2019-11-19 NOTE — PROGRESS NOTES
Assessment/Plan:     Diagnoses and all orders for this visit:    Acute pharyngitis, unspecified etiology  Comments:  Rapid strep in office is negative  P o  Amoxicillin ordered  Throat culture sent to lab  Patient to rest increase fluids follow up if persists or worsens  Orders:  -     Throat culture; Future  -     POCT rapid strepA  -     amoxicillin (AMOXIL) 500 mg capsule; Take 1 capsule (500 mg total) by mouth every 8 (eight) hours for 10 days          Subjective:      Patient ID: Clovis Cook is a 71 y o  male  Patient presents with upper respiratory symptoms for several days  Left ears block sore throat no fever head is congestion  Patient also has sinus pressure and sneezing  Patient has been taking over-the-counter Mucinex  Patient has a dry cough  Of note patient's wife is sick with bronchitis  Patient is also under care for prostate cancer he is currently undergoing radiation and also has had Lupron injection      The following portions of the patient's history were reviewed and updated as appropriate:   He   Patient Active Problem List    Diagnosis Date Noted    Prostate cancer ([facility] [address] ) 11/19/2019    Primary osteoarthritis of left knee 05/02/2018    Pain in left knee 03/16/2018    Hypothyroidism 05/03/2017    Elevated blood sugar level 11/02/2016    Glenohumeral arthritis, right 02/17/2016    Elevated prostate specific antigen (PSA) 05/31/2013     Current Outpatient Medications   Medication Sig Dispense Refill    Omega-3 Fatty Acids (FISH OIL) 1,000 mg Take 1,000 mg by mouth daily      Saw [place] 1000 MG CAPS Take by mouth      thyroid (ARMOUR) 32 5 MG tablet Take 1 tablet (32 5 mg total) by mouth daily 90 tablet 1    amoxicillin (AMOXIL) 500 mg capsule Take 1 capsule (500 mg total) by mouth every 8 (eight) hours for 10 days 30 capsule 0     No current facility-administered medications for this visit        Current Outpatient Medications on File Prior to Visit   Medication Sig    Omega-3 Fatty Acids (FISH OIL) 1,000 mg Take 1,000 mg by mouth daily    Saw [place] 1000 MG CAPS Take by mouth    thyroid (ARMOUR) 32 5 MG tablet Take 1 tablet (32 5 mg total) by mouth daily     No current facility-administered medications on file prior to visit  He has No Known Allergies       Review of Systems   Constitutional: Positive for fatigue  Negative for activity change, appetite change, chills and fever  HENT: Positive for congestion, ear pain, sinus pain, sneezing and sore throat  Negative for postnasal drip, rhinorrhea and sinus pressure  Respiratory: Positive for cough  Objective:        Physical Exam   Constitutional: He is oriented to person, place, and time  He appears well-developed and well-nourished  No distress  HENT:   Head: Normocephalic and atraumatic  Right Ear: Tympanic membrane, external ear and ear canal normal    Left Ear: External ear and ear canal normal    Nose: Nose normal  No rhinorrhea  Right sinus exhibits no maxillary sinus tenderness and no frontal sinus tenderness  Left sinus exhibits no maxillary sinus tenderness and no frontal sinus tenderness  Mouth/Throat: Posterior oropharyngeal erythema present  No oropharyngeal exudate  Left TM not physical   Patient has cerumen impaction  Patient to use over-the-counter Debrox  Eyes: Pupils are equal, round, and reactive to light  Conjunctivae are normal    Neck: Normal range of motion  Neck supple  Cardiovascular: Normal rate, regular rhythm and normal heart sounds  No murmur heard  Pulmonary/Chest: Effort normal and breath sounds normal  No respiratory distress  He has no wheezes  He has no rales  Musculoskeletal: Normal range of motion  He exhibits no edema  Lymphadenopathy:     He has cervical adenopathy  Neurological: He is alert and oriented to person, place, and time  Skin: Skin is warm  No rash noted  He is not diaphoretic  No erythema  Psychiatric: He has a normal mood and affect  His behavior is normal  Judgment and thought content normal    Nursing note and vitals reviewed

## 2019-11-22 LAB — BACTERIA THROAT CULT: NORMAL

## 2019-12-11 ENCOUNTER — OFFICE VISIT (OUTPATIENT)
Dept: FAMILY MEDICINE CLINIC | Facility: CLINIC | Age: 69
End: 2019-12-11
Payer: COMMERCIAL

## 2019-12-11 VITALS
HEART RATE: 78 BPM | OXYGEN SATURATION: 97 % | TEMPERATURE: 97.2 F | WEIGHT: 191 LBS | DIASTOLIC BLOOD PRESSURE: 80 MMHG | HEIGHT: 71 IN | SYSTOLIC BLOOD PRESSURE: 122 MMHG | BODY MASS INDEX: 26.74 KG/M2

## 2019-12-11 DIAGNOSIS — H61.23 BILATERAL IMPACTED CERUMEN: ICD-10-CM

## 2019-12-11 DIAGNOSIS — R05.8 COUGH PRESENT FOR GREATER THAN 3 WEEKS: ICD-10-CM

## 2019-12-11 DIAGNOSIS — F41.8 SITUATIONAL ANXIETY: Primary | ICD-10-CM

## 2019-12-11 PROCEDURE — 1160F RVW MEDS BY RX/DR IN RCRD: CPT | Performed by: FAMILY MEDICINE

## 2019-12-11 PROCEDURE — 1036F TOBACCO NON-USER: CPT | Performed by: FAMILY MEDICINE

## 2019-12-11 PROCEDURE — 99213 OFFICE O/P EST LOW 20 MIN: CPT | Performed by: FAMILY MEDICINE

## 2019-12-11 PROCEDURE — 3008F BODY MASS INDEX DOCD: CPT | Performed by: FAMILY MEDICINE

## 2019-12-11 RX ORDER — DIAZEPAM 5 MG/1
TABLET ORAL
Qty: 2 TABLET | Refills: 0 | Status: SHIPPED | OUTPATIENT
Start: 2019-12-11 | End: 2020-05-12

## 2019-12-11 NOTE — PROGRESS NOTES
BMI Counseling: Body mass index is 26 64 kg/m²  The BMI is above normal  Nutrition recommendations include decreasing portion sizes, decreasing fast food intake, consuming healthier snacks, limiting drinks that contain sugar and moderation in carbohydrate intake  Exercise recommendations include moderate physical activity 150 minutes/week, exercising 3-5 times per week and strength training exercises  No pharmacotherapy was ordered  Patient referred to PCP due to patient being overweight  Assessment/Plan:    1  Situational anxiety  -     diazepam (VALIUM) 5 mg tablet; Take 5mg one hour prior to procedure  2  Bilateral impacted cerumen  Comments:  consult ENT is not improving with debrox and warm water    3  Cough present for greater than 3 weeks          There are no Patient Instructions on file for this visit  No follow-ups on file  Subjective:      Patient ID: Nicholette Severance is a 71 y o  male  Chief Complaint   Patient presents with    Cough       Was treated for bronchitis few weeks ago, wants to make sure his residual cough is not turning into pneumonia  The following portions of the patient's history were reviewed and updated as appropriate:  past social history    Review of Systems   Constitutional: Negative for fatigue  HENT: Negative for congestion  Respiratory: Positive for cough  Negative for shortness of breath and wheezing  Current Outpatient Medications   Medication Sig Dispense Refill    Omega-3 Fatty Acids (FISH OIL) 1,000 mg Take 1,000 mg by mouth daily      Saw [place] 1000 MG CAPS Take by mouth      thyroid (ARMOUR) 32 5 MG tablet Take 1 tablet (32 5 mg total) by mouth daily 90 tablet 1    diazepam (VALIUM) 5 mg tablet Take 5mg one hour prior to procedure  2 tablet 0     No current facility-administered medications for this visit          Objective:    /80 (BP Location: Right arm, Patient Position: Sitting, Cuff Size: Standard)   Pulse 78 Temp (!) 97 2 °F (36 2 °C)   Ht 5' 11" (1 803 m)   Wt 86 6 kg (191 lb)   SpO2 97%   BMI 26 64 kg/m²      Physical Exam   Constitutional: He is oriented to person, place, and time  He appears well-developed and well-nourished  Cardiovascular: Normal rate, regular rhythm and normal heart sounds  Pulmonary/Chest: Effort normal and breath sounds normal    Neurological: He is alert and oriented to person, place, and time  Skin: Skin is warm  Psychiatric: He has a normal mood and affect  His behavior is normal  Judgment and thought content normal    Vitals reviewed            Enrique Burrows MD

## 2019-12-24 ENCOUNTER — OFFICE VISIT (OUTPATIENT)
Dept: URGENT CARE | Facility: CLINIC | Age: 69
End: 2019-12-24
Payer: COMMERCIAL

## 2019-12-24 ENCOUNTER — APPOINTMENT (OUTPATIENT)
Dept: RADIOLOGY | Facility: CLINIC | Age: 69
End: 2019-12-24
Payer: COMMERCIAL

## 2019-12-24 VITALS
OXYGEN SATURATION: 96 % | TEMPERATURE: 97.7 F | DIASTOLIC BLOOD PRESSURE: 71 MMHG | RESPIRATION RATE: 18 BRPM | HEART RATE: 94 BPM | SYSTOLIC BLOOD PRESSURE: 132 MMHG | BODY MASS INDEX: 26.61 KG/M2 | WEIGHT: 190.8 LBS

## 2019-12-24 DIAGNOSIS — R35.0 URINE FREQUENCY: ICD-10-CM

## 2019-12-24 DIAGNOSIS — R05.9 COUGH: Primary | ICD-10-CM

## 2019-12-24 DIAGNOSIS — R05.9 COUGH: ICD-10-CM

## 2019-12-24 LAB
SL AMB  POCT GLUCOSE, UA: NEGATIVE
SL AMB LEUKOCYTE ESTERASE,UA: NEGATIVE
SL AMB POCT BILIRUBIN,UA: NEGATIVE
SL AMB POCT BLOOD,UA: NEGATIVE
SL AMB POCT CLARITY,UA: CLEAR
SL AMB POCT COLOR,UA: YELLOW
SL AMB POCT KETONES,UA: NEGATIVE
SL AMB POCT NITRITE,UA: NEGATIVE
SL AMB POCT PH,UA: 5
SL AMB POCT SPECIFIC GRAVITY,UA: 1.01
SL AMB POCT URINE PROTEIN: NEGATIVE
SL AMB POCT UROBILINOGEN: 0.2

## 2019-12-24 PROCEDURE — 99214 OFFICE O/P EST MOD 30 MIN: CPT | Performed by: PHYSICIAN ASSISTANT

## 2019-12-24 PROCEDURE — 71046 X-RAY EXAM CHEST 2 VIEWS: CPT

## 2019-12-24 PROCEDURE — S9083 URGENT CARE CENTER GLOBAL: HCPCS | Performed by: PHYSICIAN ASSISTANT

## 2019-12-24 PROCEDURE — 81002 URINALYSIS NONAUTO W/O SCOPE: CPT | Performed by: PHYSICIAN ASSISTANT

## 2019-12-24 PROCEDURE — 87086 URINE CULTURE/COLONY COUNT: CPT | Performed by: PHYSICIAN ASSISTANT

## 2019-12-24 RX ORDER — AZITHROMYCIN 250 MG/1
TABLET, FILM COATED ORAL
Qty: 6 TABLET | Refills: 0 | Status: SHIPPED | OUTPATIENT
Start: 2019-12-24 | End: 2019-12-28

## 2019-12-24 RX ORDER — GUAIFENESIN AND CODEINE PHOSPHATE 100; 10 MG/5ML; MG/5ML
5 SOLUTION ORAL 3 TIMES DAILY PRN
Qty: 120 ML | Refills: 0 | Status: SHIPPED | OUTPATIENT
Start: 2019-12-24 | End: 2020-05-12

## 2019-12-24 RX ORDER — LANOLIN ALCOHOL/MO/W.PET/CERES
1 CREAM (GRAM) TOPICAL
COMMUNITY
End: 2021-11-02

## 2019-12-24 RX ORDER — PNV NO.95/FERROUS FUM/FOLIC AC 28MG-0.8MG
2 TABLET ORAL DAILY
COMMUNITY

## 2019-12-24 NOTE — PROGRESS NOTES
[facility]        NAME: Katie Ibarra is a 71 y o  male  : 1950    MRN: [id number]  DATE: 2019  TIME: 3:01 PM    Assessment and Plan   Cough [R05]  1  Cough  XR chest pa & lateral    azithromycin (ZITHROMAX) 250 mg tablet    guaifenesin-codeine (GUAIFENESIN AC) 100-10 MG/5ML liquid   2  Urine frequency  Urine culture    POCT urine dip         Patient Instructions   Patient Instructions   Urine dip is clear  We will send a culture  Follow up with Urology  Chest x-ray is clear  Lungs are clear on exam however he has had cough for several weeks  Will treat with azithromycin and cough suppression  Follow up with PCP in 3-5 days  Proceed to  ER if symptoms worsen  Chief Complaint     Chief Complaint   Patient presents with    Cough     c/o of cough and chest congestion for 4 and a half weeks   Urinary Frequency         History of Present Illness       40-year-old male with history of prostate cancer reports urinary frequency for the last 2-3 days  He feels like after he urinates he has to go soon again  He also complains of dry tickle cough for last 4 weeks  He was seen 4 weeks ago and treated with amoxicillin for strep throat  He is undergoing radiation  Review of Systems   Review of Systems   Constitutional: Negative for chills and fever  HENT: Positive for congestion and postnasal drip  Negative for ear pain, rhinorrhea, sinus pressure, sinus pain, sneezing and sore throat  Eyes: Negative for pain, redness and visual disturbance  Respiratory: Positive for cough  Negative for shortness of breath and wheezing  Cardiovascular: Negative for chest pain and palpitations  Gastrointestinal: Negative for abdominal pain, diarrhea, nausea and vomiting  Genitourinary: Positive for frequency  Negative for difficulty urinating, dysuria, enuresis, flank pain and hematuria  Neurological: Negative for dizziness and headaches           Current Medications Current Outpatient Medications:     glucosamine-chondroitin 500-400 MG tablet, Take 1 tablet by mouth, Disp: , Rfl:     Omega-3 Fatty Acids (FISH OIL OMEGA-3) 1000 MG CAPS, Take 2 g by mouth daily, Disp: , Rfl:     Saw [place] 1000 MG CAPS, Take by mouth, Disp: , Rfl:     thyroid (ARMOUR) 32 5 MG tablet, Take 1 tablet (32 5 mg total) by mouth daily, Disp: 90 tablet, Rfl: 1    azithromycin (ZITHROMAX) 250 mg tablet, 2 tabs on day 1, then 1 tab daily for 4 days, Disp: 6 tablet, Rfl: 0    diazepam (VALIUM) 5 mg tablet, Take 5mg one hour prior to procedure  (Patient not taking: Reported on 12/24/2019), Disp: 2 tablet, Rfl: 0    guaifenesin-codeine (GUAIFENESIN AC) 100-10 MG/5ML liquid, Take 5 mL by mouth 3 (three) times a day as needed for cough, Disp: 120 mL, Rfl: 0    Current Allergies     Allergies as of 12/24/2019    (No Known Allergies)            The following portions of the patient's history were reviewed and updated as appropriate: allergies, current medications, past family history, past medical history, past social history, past surgical history and problem list      Past Medical History:   Diagnosis Date    Disease of thyroid gland     last assessed 10/13/2016    History of radiation therapy     Impression 02/18/2016, of thymus as an infant    Primary osteoarthritis of left knee     last assessed 04/13/2016       Past Surgical History:   Procedure Laterality Date    REPLACEMENT TOTAL KNEE Left        Family History   Problem Relation Age of Onset    Heart disease Father     Heart failure Father     Dementia Mother          Medications have been verified  Objective   /71   Pulse 94   Temp 97 7 °F (36 5 °C) (Temporal)   Resp 18   Wt 86 5 kg (190 lb 12 8 oz)   SpO2 96%   BMI 26 61 kg/m²        Physical Exam     Physical Exam   Constitutional: He is oriented to person, place, and time  He appears well-developed and well-nourished  No distress     HENT:   Head: Normocephalic and atraumatic  Right Ear: Tympanic membrane, external ear and ear canal normal  Tympanic membrane is not erythematous, not retracted and not bulging  No middle ear effusion  Left Ear: Tympanic membrane, external ear and ear canal normal  Tympanic membrane is not erythematous, not retracted and not bulging  No middle ear effusion  Nose: Nose normal  No mucosal edema or rhinorrhea  Right sinus exhibits no maxillary sinus tenderness and no frontal sinus tenderness  Left sinus exhibits no maxillary sinus tenderness and no frontal sinus tenderness  Mouth/Throat: Oropharynx is clear and moist and mucous membranes are normal  No posterior oropharyngeal erythema  Eyes: Pupils are equal, round, and reactive to light  Conjunctivae and EOM are normal  Right eye exhibits no chemosis and no discharge  Left eye exhibits no chemosis and no discharge  Right conjunctiva is not injected  Left conjunctiva is not injected  Neck: Normal range of motion  Neck supple  Cardiovascular: Normal rate, regular rhythm and normal heart sounds  Pulmonary/Chest: Effort normal and breath sounds normal  No respiratory distress  He has no wheezes  He has no rales  Lymphadenopathy:     He has no cervical adenopathy  Right cervical: No superficial cervical adenopathy present  Left cervical: No superficial cervical adenopathy present  Neurological: He is alert and oriented to person, place, and time  No cranial nerve deficit  Skin: Skin is warm and dry  No rash noted

## 2019-12-24 NOTE — PATIENT INSTRUCTIONS
Urine dip is clear  We will send a culture  Follow up with Urology  Chest x-ray is clear  Lungs are clear on exam however he has had cough for several weeks  Will treat with azithromycin and cough suppression

## 2019-12-25 LAB — BACTERIA UR CULT: NORMAL

## 2020-02-26 DIAGNOSIS — E03.9 ACQUIRED HYPOTHYROIDISM: ICD-10-CM

## 2020-02-26 RX ORDER — THYROID,PORK 32.5 MG
TABLET ORAL
Qty: 90 TABLET | Refills: 1 | Status: SHIPPED | OUTPATIENT
Start: 2020-02-26 | End: 2021-02-11 | Stop reason: HOSPADM

## 2020-03-12 ENCOUNTER — TELEPHONE (OUTPATIENT)
Dept: FAMILY MEDICINE CLINIC | Facility: CLINIC | Age: 70
End: 2020-03-12

## 2020-05-12 ENCOUNTER — OFFICE VISIT (OUTPATIENT)
Dept: URGENT CARE | Facility: CLINIC | Age: 70
End: 2020-05-12
Payer: COMMERCIAL

## 2020-05-12 ENCOUNTER — APPOINTMENT (OUTPATIENT)
Dept: RADIOLOGY | Facility: CLINIC | Age: 70
End: 2020-05-12
Payer: COMMERCIAL

## 2020-05-12 VITALS
HEART RATE: 102 BPM | HEIGHT: 71 IN | SYSTOLIC BLOOD PRESSURE: 130 MMHG | BODY MASS INDEX: 26.6 KG/M2 | WEIGHT: 190 LBS | RESPIRATION RATE: 18 BRPM | DIASTOLIC BLOOD PRESSURE: 80 MMHG | TEMPERATURE: 97.1 F | OXYGEN SATURATION: 95 %

## 2020-05-12 DIAGNOSIS — S81.012A LACERATION OF SKIN OF LEFT KNEE, INITIAL ENCOUNTER: ICD-10-CM

## 2020-05-12 DIAGNOSIS — S81.012A LACERATION OF SKIN OF LEFT KNEE, INITIAL ENCOUNTER: Primary | ICD-10-CM

## 2020-05-12 PROCEDURE — 73564 X-RAY EXAM KNEE 4 OR MORE: CPT

## 2020-05-12 PROCEDURE — 99213 OFFICE O/P EST LOW 20 MIN: CPT | Performed by: PREVENTIVE MEDICINE

## 2020-05-12 PROCEDURE — S9083 URGENT CARE CENTER GLOBAL: HCPCS | Performed by: PREVENTIVE MEDICINE

## 2020-05-12 PROCEDURE — 12002 RPR S/N/AX/GEN/TRNK2.6-7.5CM: CPT | Performed by: PREVENTIVE MEDICINE

## 2020-05-12 RX ORDER — CEPHALEXIN 500 MG/1
500 CAPSULE ORAL EVERY 6 HOURS SCHEDULED
Qty: 20 CAPSULE | Refills: 0 | Status: SHIPPED | OUTPATIENT
Start: 2020-05-12 | End: 2020-05-17

## 2020-07-08 ENCOUNTER — TELEPHONE (OUTPATIENT)
Dept: FAMILY MEDICINE CLINIC | Facility: CLINIC | Age: 70
End: 2020-07-08

## 2020-07-08 DIAGNOSIS — R73.9 ELEVATED BLOOD SUGAR LEVEL: ICD-10-CM

## 2020-07-08 DIAGNOSIS — E03.9 ACQUIRED HYPOTHYROIDISM: Primary | ICD-10-CM

## 2020-07-08 NOTE — TELEPHONE ENCOUNTER
Pt's AWV scheduled virtually in error    Pt would like to know if you can order annual lab work so he can have it done before r/s AWV

## 2020-07-20 ENCOUNTER — APPOINTMENT (OUTPATIENT)
Dept: LAB | Facility: MEDICAL CENTER | Age: 70
End: 2020-07-20
Payer: COMMERCIAL

## 2020-07-20 ENCOUNTER — TRANSCRIBE ORDERS (OUTPATIENT)
Dept: ADMINISTRATIVE | Facility: HOSPITAL | Age: 70
End: 2020-07-20

## 2020-07-20 DIAGNOSIS — R73.9 ELEVATED BLOOD SUGAR LEVEL: ICD-10-CM

## 2020-07-20 DIAGNOSIS — C61 ADENOCARCINOMA OF PROSTATE (HCC): ICD-10-CM

## 2020-07-20 DIAGNOSIS — E03.9 ACQUIRED HYPOTHYROIDISM: ICD-10-CM

## 2020-07-20 DIAGNOSIS — C61 ADENOCARCINOMA OF PROSTATE (HCC): Primary | ICD-10-CM

## 2020-07-20 LAB
ALBUMIN SERPL BCP-MCNC: 3.7 G/DL (ref 3.5–5)
ALP SERPL-CCNC: 55 U/L (ref 46–116)
ALT SERPL W P-5'-P-CCNC: 31 U/L (ref 12–78)
ANION GAP SERPL CALCULATED.3IONS-SCNC: 4 MMOL/L (ref 4–13)
AST SERPL W P-5'-P-CCNC: 18 U/L (ref 5–45)
BASOPHILS # BLD AUTO: 0.01 THOUSANDS/ΜL (ref 0–0.1)
BASOPHILS NFR BLD AUTO: 0 % (ref 0–1)
BILIRUB SERPL-MCNC: 0.6 MG/DL (ref 0.2–1)
BUN SERPL-MCNC: 18 MG/DL (ref 5–25)
CALCIUM SERPL-MCNC: 9.3 MG/DL (ref 8.3–10.1)
CHLORIDE SERPL-SCNC: 105 MMOL/L (ref 100–108)
CHOLEST SERPL-MCNC: 262 MG/DL (ref 50–200)
CO2 SERPL-SCNC: 29 MMOL/L (ref 21–32)
CREAT SERPL-MCNC: 0.95 MG/DL (ref 0.6–1.3)
EOSINOPHIL # BLD AUTO: 0.2 THOUSAND/ΜL (ref 0–0.61)
EOSINOPHIL NFR BLD AUTO: 4 % (ref 0–6)
ERYTHROCYTE [DISTWIDTH] IN BLOOD BY AUTOMATED COUNT: 12.6 % (ref 11.6–15.1)
EST. AVERAGE GLUCOSE BLD GHB EST-MCNC: 105 MG/DL
GFR SERPL CREATININE-BSD FRML MDRD: 81 ML/MIN/1.73SQ M
GLUCOSE P FAST SERPL-MCNC: 99 MG/DL (ref 65–99)
HBA1C MFR BLD: 5.3 %
HCT VFR BLD AUTO: 41.7 % (ref 36.5–49.3)
HDLC SERPL-MCNC: 85 MG/DL
HGB BLD-MCNC: 14.4 G/DL (ref 12–17)
IMM GRANULOCYTES # BLD AUTO: 0.01 THOUSAND/UL (ref 0–0.2)
IMM GRANULOCYTES NFR BLD AUTO: 0 % (ref 0–2)
LDLC SERPL CALC-MCNC: 161 MG/DL (ref 0–100)
LYMPHOCYTES # BLD AUTO: 0.95 THOUSANDS/ΜL (ref 0.6–4.47)
LYMPHOCYTES NFR BLD AUTO: 19 % (ref 14–44)
MCH RBC QN AUTO: 34.4 PG (ref 26.8–34.3)
MCHC RBC AUTO-ENTMCNC: 34.5 G/DL (ref 31.4–37.4)
MCV RBC AUTO: 100 FL (ref 82–98)
MONOCYTES # BLD AUTO: 0.42 THOUSAND/ΜL (ref 0.17–1.22)
MONOCYTES NFR BLD AUTO: 9 % (ref 4–12)
NEUTROPHILS # BLD AUTO: 3.36 THOUSANDS/ΜL (ref 1.85–7.62)
NEUTS SEG NFR BLD AUTO: 68 % (ref 43–75)
NONHDLC SERPL-MCNC: 177 MG/DL
NRBC BLD AUTO-RTO: 0 /100 WBCS
PLATELET # BLD AUTO: 129 THOUSANDS/UL (ref 149–390)
PMV BLD AUTO: 10.7 FL (ref 8.9–12.7)
POTASSIUM SERPL-SCNC: 4.1 MMOL/L (ref 3.5–5.3)
PROT SERPL-MCNC: 7.2 G/DL (ref 6.4–8.2)
PSA SERPL-MCNC: <0.1 NG/ML (ref 0–4)
RBC # BLD AUTO: 4.19 MILLION/UL (ref 3.88–5.62)
SODIUM SERPL-SCNC: 138 MMOL/L (ref 136–145)
TESTOST SERPL-MCNC: 41 NG/DL (ref 95–948)
TRIGL SERPL-MCNC: 78 MG/DL
TSH SERPL DL<=0.05 MIU/L-ACNC: 3.61 UIU/ML (ref 0.36–3.74)
WBC # BLD AUTO: 4.95 THOUSAND/UL (ref 4.31–10.16)

## 2020-07-20 PROCEDURE — 80053 COMPREHEN METABOLIC PANEL: CPT

## 2020-07-20 PROCEDURE — G0103 PSA SCREENING: HCPCS | Performed by: UROLOGY

## 2020-07-20 PROCEDURE — 84403 ASSAY OF TOTAL TESTOSTERONE: CPT

## 2020-07-20 PROCEDURE — 84443 ASSAY THYROID STIM HORMONE: CPT

## 2020-07-20 PROCEDURE — 83036 HEMOGLOBIN GLYCOSYLATED A1C: CPT

## 2020-07-20 PROCEDURE — 80061 LIPID PANEL: CPT

## 2020-07-20 PROCEDURE — 36415 COLL VENOUS BLD VENIPUNCTURE: CPT | Performed by: UROLOGY

## 2020-07-20 PROCEDURE — 85025 COMPLETE CBC W/AUTO DIFF WBC: CPT

## 2020-07-30 ENCOUNTER — TELEMEDICINE (OUTPATIENT)
Dept: FAMILY MEDICINE CLINIC | Facility: CLINIC | Age: 70
End: 2020-07-30
Payer: COMMERCIAL

## 2020-07-30 DIAGNOSIS — D69.6 THROMBOCYTOPENIA (HCC): ICD-10-CM

## 2020-07-30 DIAGNOSIS — M17.12 PRIMARY OSTEOARTHRITIS OF LEFT KNEE: ICD-10-CM

## 2020-07-30 DIAGNOSIS — C61 PROSTATE CANCER (HCC): Primary | ICD-10-CM

## 2020-07-30 DIAGNOSIS — E03.9 ACQUIRED HYPOTHYROIDISM: ICD-10-CM

## 2020-07-30 PROCEDURE — 1160F RVW MEDS BY RX/DR IN RCRD: CPT | Performed by: FAMILY MEDICINE

## 2020-07-30 PROCEDURE — 1036F TOBACCO NON-USER: CPT | Performed by: FAMILY MEDICINE

## 2020-07-30 PROCEDURE — 99214 OFFICE O/P EST MOD 30 MIN: CPT | Performed by: FAMILY MEDICINE

## 2020-07-30 PROCEDURE — 4040F PNEUMOC VAC/ADMIN/RCVD: CPT | Performed by: FAMILY MEDICINE

## 2020-07-30 NOTE — PROGRESS NOTES
Virtual Regular Visit      Assessment/Plan:    Problem List Items Addressed This Visit        Endocrine    Hypothyroidism       Musculoskeletal and Integument    Primary osteoarthritis of left knee       Genitourinary    Prostate cancer ([facility] [address] ) - Primary      Other Visit Diagnoses     Thrombocytopenia ([facility] [address] )        no sx, will follow, related to lupron? Reason for visit is   Chief Complaint   Patient presents with    Virtual Regular Visit        Encounter provider Sung Dumont MD    Provider located at [address] [place] [id number] [phone]      Recent Visits  No visits were found meeting these conditions  Showing recent visits within past 7 days and meeting all other requirements     Today's Visits  Date Type Provider Dept   07/30/20 Telemedicine Sung Dumont MD [facility]   Showing today's visits and meeting all other requirements     Future Appointments  No visits were found meeting these conditions  Showing future appointments within next 150 days and meeting all other requirements        The patient was identified by name and date of birth  Jennie Barba was informed that this is a telemedicine visit and that the visit is being conducted through [address] and patient was informed that this is not a secure, HIPAA-complaint platform  He agrees to proceed     My office door was closed  No one else was in the room  He acknowledged consent and understanding of privacy and security of the video platform  The patient has agreed to participate and understands they can discontinue the visit at any time  Patient is aware this is a billable service  Subjective  Jennie Barba is a 79 y o  male  Doing well  Had lupron shot in October  F/u PSA was low/undetectable  Follows with Dr Bridger Watters in [facility]  Has f/u appt with him in 2 weeks  Pt labs reviewed   LDL is up, pt denies MI or CVA in the family, but dad did have CAD in his 66's  Pt with much different lifestyle he states  Testosterone is notably down, to 41, but pt states he's feels great and is not interested in replacement at this time  Platelets are down to 126 but pt denies bleeding issues  Knee feeling ebtter since surgery  Taking only 1/2 tab of naturethroid, TSH stable  Past Medical History:   Diagnosis Date    Disease of thyroid gland     last assessed 10/13/2016    History of radiation therapy     Impression 02/18/2016, of thymus as an infant    Primary osteoarthritis of left knee     last assessed 04/13/2016       Past Surgical History:   Procedure Laterality Date    REPLACEMENT TOTAL KNEE Left     TOTAL SHOULDER REPLACEMENT Right        Current Outpatient Medications   Medication Sig Dispense Refill    glucosamine-chondroitin 500-400 MG tablet Take 1 tablet by mouth      NATURE-THROID 32 5 MG tablet TAKE 1 TABLET (32 5 MG TOTAL) BY MOUTH DAILY 90 tablet 1    Omega-3 Fatty Acids (FISH OIL OMEGA-3) 1000 MG CAPS Take 2 g by mouth daily       No current facility-administered medications for this visit  No Known Allergies    Review of Systems   Constitutional: Negative for fatigue and fever  HENT: Negative for congestion  Eyes: Negative for visual disturbance  Respiratory: Negative for chest tightness and shortness of breath  Cardiovascular: Negative for chest pain and palpitations  Gastrointestinal: Negative for abdominal pain  Genitourinary: Negative for difficulty urinating  Musculoskeletal: Negative for arthralgias  Neurological: Negative for headaches  Hematological: Does not bruise/bleed easily  Video Exam    There were no vitals filed for this visit  Physical Exam   Constitutional: He is oriented to person, place, and time  He appears well-developed and well-nourished  126/76  71"  190#   Pulmonary/Chest: Effort normal    Neurological: He is alert and oriented to person, place, and time     Psychiatric: He has a normal mood and affect  His behavior is normal  Judgment and thought content normal    Vitals reviewed  I spent 10 minutes directly with the patient during this visit      VIRTUAL VISIT [facility] acknowledges that he has consented to an online visit or consultation  He understands that the online visit is based solely on information provided by him, and that, in the absence of a face-to-face physical evaluation by the physician, the diagnosis he receives is both limited and provisional in terms of accuracy and completeness  This is not intended to replace a full medical face-to-face evaluation by the physician  Rebekah Handley understands and accepts these terms

## 2020-08-07 ENCOUNTER — TRANSCRIBE ORDERS (OUTPATIENT)
Dept: ADMINISTRATIVE | Facility: HOSPITAL | Age: 70
End: 2020-08-07

## 2020-08-07 ENCOUNTER — APPOINTMENT (OUTPATIENT)
Dept: LAB | Facility: MEDICAL CENTER | Age: 70
End: 2020-08-07
Payer: COMMERCIAL
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
## 2020-08-07 LAB
25(OH)D3 SERPL-MCNC: 69.2 NG/ML (ref 30–100)
ERYTHROCYTE [SEDIMENTATION RATE] IN BLOOD: 14 MM/HOUR (ref 0–19)

## 2020-08-07 PROCEDURE — 86430 RHEUMATOID FACTOR TEST QUAL: CPT

## 2020-08-07 PROCEDURE — 86618 LYME DISEASE ANTIBODY: CPT | Performed by: PHYSICAL MEDICINE & REHABILITATION

## 2020-08-07 PROCEDURE — 85652 RBC SED RATE AUTOMATED: CPT | Performed by: PHYSICAL MEDICINE & REHABILITATION

## 2020-08-07 PROCEDURE — 36415 COLL VENOUS BLD VENIPUNCTURE: CPT | Performed by: PHYSICAL MEDICINE & REHABILITATION

## 2020-08-07 PROCEDURE — 86431 RHEUMATOID FACTOR QUANT: CPT

## 2020-08-07 PROCEDURE — 86038 ANTINUCLEAR ANTIBODIES: CPT | Performed by: PHYSICAL MEDICINE & REHABILITATION

## 2020-08-07 PROCEDURE — 82306 VITAMIN D 25 HYDROXY: CPT | Performed by: PHYSICAL MEDICINE & REHABILITATION

## 2020-08-08 LAB — B BURGDOR IGG+IGM SER-ACNC: <0.91 ISR (ref 0–0.9)

## 2020-08-10 LAB
CRYOGLOB RF SER-ACNC: ABNORMAL [IU]/ML
RHEUMATOID FACT SER QL LA: POSITIVE
RYE IGE QN: NEGATIVE

## 2020-08-11 ENCOUNTER — OFFICE VISIT (OUTPATIENT)
Dept: FAMILY MEDICINE CLINIC | Facility: CLINIC | Age: 70
End: 2020-08-11
Payer: COMMERCIAL

## 2020-08-11 VITALS
SYSTOLIC BLOOD PRESSURE: 120 MMHG | OXYGEN SATURATION: 97 % | HEIGHT: 71 IN | BODY MASS INDEX: 27.77 KG/M2 | WEIGHT: 198.4 LBS | DIASTOLIC BLOOD PRESSURE: 80 MMHG | TEMPERATURE: 97.3 F | RESPIRATION RATE: 16 BRPM | HEART RATE: 105 BPM

## 2020-08-11 DIAGNOSIS — M05.79 RHEUMATOID ARTHRITIS INVOLVING MULTIPLE SITES WITH POSITIVE RHEUMATOID FACTOR (HCC): Primary | ICD-10-CM

## 2020-08-11 PROBLEM — M05.9 RHEUMATOID ARTHRITIS WITH POSITIVE RHEUMATOID FACTOR (HCC): Status: ACTIVE | Noted: 2020-08-11

## 2020-08-11 PROCEDURE — 3725F SCREEN DEPRESSION PERFORMED: CPT | Performed by: PHYSICIAN ASSISTANT

## 2020-08-11 PROCEDURE — 4040F PNEUMOC VAC/ADMIN/RCVD: CPT | Performed by: PHYSICIAN ASSISTANT

## 2020-08-11 PROCEDURE — 1160F RVW MEDS BY RX/DR IN RCRD: CPT | Performed by: PHYSICIAN ASSISTANT

## 2020-08-11 PROCEDURE — 99214 OFFICE O/P EST MOD 30 MIN: CPT | Performed by: PHYSICIAN ASSISTANT

## 2020-08-11 PROCEDURE — 3008F BODY MASS INDEX DOCD: CPT | Performed by: PHYSICIAN ASSISTANT

## 2020-08-11 PROCEDURE — 1036F TOBACCO NON-USER: CPT | Performed by: PHYSICIAN ASSISTANT

## 2020-08-11 NOTE — PROGRESS NOTES
Assessment/Plan:     Diagnoses and all orders for this visit:    Rheumatoid arthritis involving multiple sites with positive rheumatoid factor ([facility] )  Comments:  Referred to rheumatology  Orders:  -     Ambulatory referral to Rheumatology; Future          Subjective:      Patient ID: Georgeana Holstein is a 79 y o  male  Patient presents in the office for follow-up insect bite right anterior shin region  Patient states the area was red and warm it is now improved  Labs reviewed with patient reveal a negative Lyme titer sed rate DANIELLE  His rheumatoid factor is positive with a titer of 10  Patient has known diffuse osteoarthritis status post right shoulder replacement and a knee replacement  Patient has some carpal changes of the right hand  No rheumatoid nodules observed  Patient does not complain of any joint swelling  We will refer to rheumatology        The following portions of the patient's history were reviewed and updated as appropriate:   He   Patient Active Problem List    Diagnosis Date Noted    Rheumatoid arthritis with positive rheumatoid factor ([facility] ) 08/11/2020    Prostate cancer ([facility] ) 11/19/2019    Primary osteoarthritis of left knee 05/02/2018    Pain in left knee 03/16/2018    Hypothyroidism 05/03/2017    Elevated blood sugar level 11/02/2016    Elevated prostate specific antigen (PSA) 05/31/2013     Current Outpatient Medications   Medication Sig Dispense Refill    glucosamine-chondroitin 500-400 MG tablet Take 1 tablet by mouth      NATURE-THROID 32 5 MG tablet TAKE 1 TABLET (32 5 MG TOTAL) BY MOUTH DAILY 90 tablet 1    Omega-3 Fatty Acids (FISH OIL OMEGA-3) 1000 MG CAPS Take 2 g by mouth daily       No current facility-administered medications for this visit        Current Outpatient Medications on File Prior to Visit   Medication Sig    glucosamine-chondroitin 500-400 MG tablet Take 1 tablet by mouth    NATURE-THROID 32 5 MG tablet TAKE 1 TABLET (32 5 MG TOTAL) BY MOUTH DAILY    Omega-3 Fatty Acids (FISH OIL OMEGA-3) 1000 MG CAPS Take 2 g by mouth daily     No current facility-administered medications on file prior to visit  He has No Known Allergies       Review of Systems   Skin: Negative for rash  Objective:        Physical Exam  Vitals signs and nursing note reviewed  Constitutional:       Appearance: Normal appearance  He is normal weight  HENT:      Head: Normocephalic and atraumatic  Right Ear: External ear normal       Left Ear: External ear normal    Eyes:      Conjunctiva/sclera: Conjunctivae normal    Cardiovascular:      Rate and Rhythm: Normal rate and regular rhythm  Heart sounds: No murmur  Pulmonary:      Effort: Pulmonary effort is normal       Breath sounds: Normal breath sounds  Musculoskeletal:      Right lower leg: No edema  Left lower leg: No edema  Skin:     Comments: Right anterior mid shin some erythema  There is no increased heat area P orders to be healing well   Neurological:      General: No focal deficit present  Mental Status: He is alert and oriented to person, place, and time  Psychiatric:         Mood and Affect: Mood normal          Behavior: Behavior normal          Thought Content:  Thought content normal          Judgment: Judgment normal

## 2020-09-01 ENCOUNTER — TELEPHONE (OUTPATIENT)
Dept: FAMILY MEDICINE CLINIC | Facility: CLINIC | Age: 70
End: 2020-09-01

## 2020-09-01 NOTE — TELEPHONE ENCOUNTER
Patient called in bout his lab- rheumatoid factor  Normal level is 15 and his was low at 10  His shoulder Dr said low was ok- he doesn't understand why he has to see a rheumatologist if it's ok if it's low- why did the lab flag it? Please explain to patient to patient       [phone]

## 2020-09-02 NOTE — TELEPHONE ENCOUNTER
Spoke with patient  He had a positive rheumatoid factor with a titer positive at 10  According to patient and his orthopedic anything under 15 is considered negative  Patient questioning his need to go to the rheumatologist   Given the patient the option he can follow-up with Rheumatology is sent asked them the questions for I do not know the answers  He cannot go the rheumatologist and we will repeat the test in 6 months to year and recheck the titers    At this time the patient states he has an appoint with Rheumatology and will keep such appointment

## 2020-09-10 ENCOUNTER — TRANSCRIBE ORDERS (OUTPATIENT)
Dept: ADMINISTRATIVE | Facility: HOSPITAL | Age: 70
End: 2020-09-10

## 2020-09-10 ENCOUNTER — APPOINTMENT (OUTPATIENT)
Dept: LAB | Facility: MEDICAL CENTER | Age: 70
End: 2020-09-10
Payer: COMMERCIAL

## 2020-09-10 DIAGNOSIS — M05.80 POLYARTHRITIS WITH POSITIVE RHEUMATOID FACTOR (HCC): Primary | ICD-10-CM

## 2020-09-10 DIAGNOSIS — M05.80 POLYARTHRITIS WITH POSITIVE RHEUMATOID FACTOR (HCC): ICD-10-CM

## 2020-09-10 LAB
CK SERPL-CCNC: 77 U/L (ref 39–308)
HBV SURFACE AG SER QL: NORMAL
HCV AB SER QL: NORMAL
IGA SERPL-MCNC: 106 MG/DL (ref 70–400)
IGG SERPL-MCNC: 781 MG/DL (ref 700–1600)
IGM SERPL-MCNC: 252 MG/DL (ref 40–230)

## 2020-09-10 PROCEDURE — 84165 PROTEIN E-PHORESIS SERUM: CPT

## 2020-09-10 PROCEDURE — 86255 FLUORESCENT ANTIBODY SCREEN: CPT

## 2020-09-10 PROCEDURE — 84165 PROTEIN E-PHORESIS SERUM: CPT | Performed by: PATHOLOGY

## 2020-09-10 PROCEDURE — 86800 THYROGLOBULIN ANTIBODY: CPT

## 2020-09-10 PROCEDURE — 86334 IMMUNOFIX E-PHORESIS SERUM: CPT

## 2020-09-10 PROCEDURE — 86235 NUCLEAR ANTIGEN ANTIBODY: CPT

## 2020-09-10 PROCEDURE — 84432 ASSAY OF THYROGLOBULIN: CPT

## 2020-09-10 PROCEDURE — 36415 COLL VENOUS BLD VENIPUNCTURE: CPT | Performed by: PHYSICIAN ASSISTANT

## 2020-09-10 PROCEDURE — 86803 HEPATITIS C AB TEST: CPT

## 2020-09-10 PROCEDURE — 86200 CCP ANTIBODY: CPT

## 2020-09-10 PROCEDURE — 82550 ASSAY OF CK (CPK): CPT | Performed by: PHYSICIAN ASSISTANT

## 2020-09-10 PROCEDURE — 87340 HEPATITIS B SURFACE AG IA: CPT

## 2020-09-10 PROCEDURE — 86334 IMMUNOFIX E-PHORESIS SERUM: CPT | Performed by: PATHOLOGY

## 2020-09-10 PROCEDURE — 82784 ASSAY IGA/IGD/IGG/IGM EACH: CPT

## 2020-09-11 LAB
ENA SS-A AB SER-ACNC: <0.2 AI (ref 0–0.9)
ENA SS-B AB SER-ACNC: <0.2 AI (ref 0–0.9)
THYROGLOB AB SERPL-ACNC: <1 IU/ML (ref 0–0.9)
THYROGLOB SERPL-MCNC: 18.3 NG/ML (ref 1.4–29.2)

## 2020-09-12 LAB — CCP IGA+IGG SERPL IA-ACNC: 7 UNITS (ref 0–19)

## 2020-09-14 LAB
ALBUMIN SERPL ELPH-MCNC: 4.48 G/DL (ref 3.5–5)
ALBUMIN SERPL ELPH-MCNC: 64 % (ref 52–65)
ALPHA1 GLOB SERPL ELPH-MCNC: 0.3 G/DL (ref 0.1–0.4)
ALPHA1 GLOB SERPL ELPH-MCNC: 4.3 % (ref 2.5–5)
ALPHA2 GLOB SERPL ELPH-MCNC: 0.68 G/DL (ref 0.4–1.2)
ALPHA2 GLOB SERPL ELPH-MCNC: 9.7 % (ref 7–13)
BETA GLOB ABNORMAL SERPL ELPH-MCNC: 0.39 G/DL (ref 0.4–0.8)
BETA1 GLOB SERPL ELPH-MCNC: 5.6 % (ref 5–13)
BETA2 GLOB SERPL ELPH-MCNC: 4.2 % (ref 2–8)
BETA2+GAMMA GLOB SERPL ELPH-MCNC: 0.29 G/DL (ref 0.2–0.5)
GAMMA GLOB ABNORMAL SERPL ELPH-MCNC: 0.85 G/DL (ref 0.5–1.6)
GAMMA GLOB SERPL ELPH-MCNC: 12.2 % (ref 12–22)
IGG/ALB SER: 1.78 {RATIO} (ref 1.1–1.8)
INTERPRETATION UR IFE-IMP: NORMAL
M PEAK ID 2: 3.9 %
M PROTEIN 1 MFR SERPL ELPH: 2.6 %
M PROTEIN 1 SERPL ELPH-MCNC: 0.18 G/DL
M PROTEIN 2 SERPL ELPH-MCNC: 0.27 G/DL
PROT PATTERN SERPL ELPH-IMP: ABNORMAL
PROT SERPL-MCNC: 7 G/DL (ref 6.4–8.2)

## 2020-09-15 LAB
C-ANCA TITR SER IF: NORMAL TITER
MYELOPEROXIDASE AB SER IA-ACNC: <9 U/ML (ref 0–9)
P-ANCA ATYPICAL TITR SER IF: NORMAL TITER
P-ANCA TITR SER IF: NORMAL TITER
PROTEINASE3 AB SER IA-ACNC: <3.5 U/ML (ref 0–3.5)

## 2020-12-14 DIAGNOSIS — E03.9 ACQUIRED HYPOTHYROIDISM: ICD-10-CM

## 2020-12-14 NOTE — TELEPHONE ENCOUNTER
Pt's wife was notified by pharmacy that there has been a recall on his [employer] Throid 32 5 mg  Pt would like to know if there is anything natural they can replace it with  Audrene Stamp stated they were cutting pills in half & taking 1/2 tablet daily

## 2020-12-16 ENCOUNTER — TELEPHONE (OUTPATIENT)
Dept: FAMILY MEDICINE CLINIC | Facility: CLINIC | Age: 70
End: 2020-12-16

## 2020-12-17 RX ORDER — THYROID,PORK 32.5 MG
TABLET ORAL
Qty: 90 TABLET | Refills: 1 | OUTPATIENT
Start: 2020-12-17

## 2020-12-21 NOTE — TELEPHONE ENCOUNTER
I called & confirmed w/ pharmacist at [address] [place] [address] [place]  He stated they can get either levothyroxine or armour thyroid  Which ever you'd rather prescribe, they have available

## 2020-12-22 DIAGNOSIS — E03.9 ACQUIRED HYPOTHYROIDISM: Primary | ICD-10-CM

## 2020-12-23 NOTE — TELEPHONE ENCOUNTER
Actually, they wanted to get the labs done again and see if they even need it so I will wait until I get new labs back

## 2021-01-20 ENCOUNTER — TELEPHONE (OUTPATIENT)
Dept: FAMILY MEDICINE CLINIC | Facility: CLINIC | Age: 71
End: 2021-01-20

## 2021-01-20 DIAGNOSIS — R73.9 ELEVATED BLOOD SUGAR LEVEL: ICD-10-CM

## 2021-01-20 DIAGNOSIS — E03.9 ACQUIRED HYPOTHYROIDISM: Primary | ICD-10-CM

## 2021-01-20 DIAGNOSIS — D69.6 THROMBOCYTOPENIA (HCC): ICD-10-CM

## 2021-01-20 NOTE — TELEPHONE ENCOUNTER
Patient's wife called in asking about labs for his AWV  Please advise on what should be ordered   note- call asap as he's planning on going tomorrow  He uses [facility]   If he doesn't answer, leave message with details

## 2021-01-21 ENCOUNTER — LAB (OUTPATIENT)
Dept: LAB | Facility: MEDICAL CENTER | Age: 71
End: 2021-01-21
Payer: COMMERCIAL

## 2021-01-21 DIAGNOSIS — D69.6 THROMBOCYTOPENIA (HCC): ICD-10-CM

## 2021-01-21 DIAGNOSIS — E03.9 ACQUIRED HYPOTHYROIDISM: ICD-10-CM

## 2021-01-21 DIAGNOSIS — R73.9 ELEVATED BLOOD SUGAR LEVEL: ICD-10-CM

## 2021-01-21 LAB
ALBUMIN SERPL BCP-MCNC: 3.8 G/DL (ref 3.5–5)
ALP SERPL-CCNC: 56 U/L (ref 46–116)
ALT SERPL W P-5'-P-CCNC: 30 U/L (ref 12–78)
ANION GAP SERPL CALCULATED.3IONS-SCNC: 4 MMOL/L (ref 4–13)
AST SERPL W P-5'-P-CCNC: 21 U/L (ref 5–45)
BASOPHILS # BLD AUTO: 0.03 THOUSANDS/ΜL (ref 0–0.1)
BASOPHILS NFR BLD AUTO: 1 % (ref 0–1)
BILIRUB SERPL-MCNC: 0.55 MG/DL (ref 0.2–1)
BUN SERPL-MCNC: 18 MG/DL (ref 5–25)
CALCIUM SERPL-MCNC: 9.2 MG/DL (ref 8.3–10.1)
CHLORIDE SERPL-SCNC: 107 MMOL/L (ref 100–108)
CHOLEST SERPL-MCNC: 206 MG/DL (ref 50–200)
CO2 SERPL-SCNC: 28 MMOL/L (ref 21–32)
CREAT SERPL-MCNC: 0.76 MG/DL (ref 0.6–1.3)
EOSINOPHIL # BLD AUTO: 0.25 THOUSAND/ΜL (ref 0–0.61)
EOSINOPHIL NFR BLD AUTO: 6 % (ref 0–6)
ERYTHROCYTE [DISTWIDTH] IN BLOOD BY AUTOMATED COUNT: 13.1 % (ref 11.6–15.1)
EST. AVERAGE GLUCOSE BLD GHB EST-MCNC: 97 MG/DL
GFR SERPL CREATININE-BSD FRML MDRD: 92 ML/MIN/1.73SQ M
GLUCOSE P FAST SERPL-MCNC: 107 MG/DL (ref 65–99)
HBA1C MFR BLD: 5 %
HCT VFR BLD AUTO: 41.4 % (ref 36.5–49.3)
HDLC SERPL-MCNC: 80 MG/DL
HGB BLD-MCNC: 14.1 G/DL (ref 12–17)
IMM GRANULOCYTES # BLD AUTO: 0.01 THOUSAND/UL (ref 0–0.2)
IMM GRANULOCYTES NFR BLD AUTO: 0 % (ref 0–2)
LDLC SERPL CALC-MCNC: 106 MG/DL (ref 0–100)
LYMPHOCYTES # BLD AUTO: 1.01 THOUSANDS/ΜL (ref 0.6–4.47)
LYMPHOCYTES NFR BLD AUTO: 24 % (ref 14–44)
MCH RBC QN AUTO: 33.3 PG (ref 26.8–34.3)
MCHC RBC AUTO-ENTMCNC: 34.1 G/DL (ref 31.4–37.4)
MCV RBC AUTO: 98 FL (ref 82–98)
MONOCYTES # BLD AUTO: 0.43 THOUSAND/ΜL (ref 0.17–1.22)
MONOCYTES NFR BLD AUTO: 10 % (ref 4–12)
NEUTROPHILS # BLD AUTO: 2.51 THOUSANDS/ΜL (ref 1.85–7.62)
NEUTS SEG NFR BLD AUTO: 59 % (ref 43–75)
NONHDLC SERPL-MCNC: 126 MG/DL
NRBC BLD AUTO-RTO: 0 /100 WBCS
PLATELET # BLD AUTO: 149 THOUSANDS/UL (ref 149–390)
PMV BLD AUTO: 10.9 FL (ref 8.9–12.7)
POTASSIUM SERPL-SCNC: 4.3 MMOL/L (ref 3.5–5.3)
PROT SERPL-MCNC: 6.8 G/DL (ref 6.4–8.2)
RBC # BLD AUTO: 4.24 MILLION/UL (ref 3.88–5.62)
SODIUM SERPL-SCNC: 139 MMOL/L (ref 136–145)
TRIGL SERPL-MCNC: 100 MG/DL
TSH SERPL DL<=0.05 MIU/L-ACNC: 3.77 UIU/ML (ref 0.36–3.74)
WBC # BLD AUTO: 4.24 THOUSAND/UL (ref 4.31–10.16)

## 2021-01-21 PROCEDURE — 36415 COLL VENOUS BLD VENIPUNCTURE: CPT

## 2021-01-21 PROCEDURE — 80053 COMPREHEN METABOLIC PANEL: CPT

## 2021-01-21 PROCEDURE — 85025 COMPLETE CBC W/AUTO DIFF WBC: CPT

## 2021-01-21 PROCEDURE — 80061 LIPID PANEL: CPT

## 2021-01-21 PROCEDURE — 83036 HEMOGLOBIN GLYCOSYLATED A1C: CPT

## 2021-01-21 PROCEDURE — 84443 ASSAY THYROID STIM HORMONE: CPT

## 2021-02-11 ENCOUNTER — TELEMEDICINE (OUTPATIENT)
Dept: FAMILY MEDICINE CLINIC | Facility: CLINIC | Age: 71
End: 2021-02-11
Payer: COMMERCIAL

## 2021-02-11 DIAGNOSIS — C61 PROSTATE CANCER (HCC): ICD-10-CM

## 2021-02-11 DIAGNOSIS — R22.32 MASS OF LEFT WRIST: ICD-10-CM

## 2021-02-11 DIAGNOSIS — R73.9 ELEVATED BLOOD SUGAR LEVEL: ICD-10-CM

## 2021-02-11 DIAGNOSIS — Z00.00 MEDICARE ANNUAL WELLNESS VISIT, SUBSEQUENT: Primary | ICD-10-CM

## 2021-02-11 DIAGNOSIS — E03.9 ACQUIRED HYPOTHYROIDISM: ICD-10-CM

## 2021-02-11 PROCEDURE — 1125F AMNT PAIN NOTED PAIN PRSNT: CPT | Performed by: FAMILY MEDICINE

## 2021-02-11 PROCEDURE — 1170F FXNL STATUS ASSESSED: CPT | Performed by: FAMILY MEDICINE

## 2021-02-11 PROCEDURE — G0439 PPPS, SUBSEQ VISIT: HCPCS | Performed by: FAMILY MEDICINE

## 2021-02-11 PROCEDURE — 99214 OFFICE O/P EST MOD 30 MIN: CPT | Performed by: FAMILY MEDICINE

## 2021-02-11 PROCEDURE — 3288F FALL RISK ASSESSMENT DOCD: CPT | Performed by: FAMILY MEDICINE

## 2021-02-11 NOTE — PROGRESS NOTES
Virtual AWV Consent    Reason for visit is awv    Encounter provider Slim Pena MD    Provider located at [address] [place] [id number] [phone]      Recent Visits  No visits were found meeting these conditions  Showing recent visits within past 7 days and meeting all other requirements     Today's Visits  Date Type Provider Dept   02/11/21 Telemedicine MD Jim Mcbride   Showing today's visits and meeting all other requirements     Future Appointments  No visits were found meeting these conditions  Showing future appointments within next 150 days and meeting all other requirements        After connecting through [employer], the patient was identified by name and date of birth  Clovis Cook was informed that this is a telemedicine visit and that the visit is being conducted through [employer] and patient was informed that this is not a secure, HIPAA-compliant platform  He agrees to proceed  My office door was closed  No one else was in the room  He acknowledged consent and understanding of privacy and security of the video platform  The patient has agreed to participate and understands they can discontinue the visit at any time    Patient is aware this is a billable service  Assessment and Plan:     Problem List Items Addressed This Visit        Genitourinary    Prostate cancer [facility])       Other    Mass of left wrist      Other Visit Diagnoses     Medicare annual wellness visit, subsequent    -  Primary           Preventive health issues were discussed with patient, and age appropriate screening tests were ordered as noted in patient's After Visit Summary  Personalized health advice and appropriate referrals for health education or preventive services given if needed, as noted in patient's After Visit Summary       History of Present Illness:     Patient presents for Medicare Annual Wellness visit    Patient Care Team:  Cathy Patel MD as PCP - General  Gilberto Palmer PA-C     Problem List:     Patient Active Problem List   Diagnosis    Pain in left knee    Primary osteoarthritis of left knee    Elevated blood sugar level    Elevated prostate specific antigen (PSA)    Hypothyroidism    Prostate cancer ([address] )    Rheumatoid arthritis with positive rheumatoid factor (HCC)    Mass of left wrist      Past Medical and Surgical History:     Past Medical History:   Diagnosis Date    Disease of thyroid gland     last assessed 10/13/2016    History of radiation therapy     Impression 02/18/2016, of thymus as an infant    Primary osteoarthritis of left knee     last assessed 04/13/2016     Past Surgical History:   Procedure Laterality Date    REPLACEMENT TOTAL KNEE Left     TOTAL SHOULDER REPLACEMENT Right       Family History:     Family History   Problem Relation Age of Onset    Heart disease Father     Heart failure Father     Dementia Mother       Social History:        Social History     Socioeconomic History    Marital status: /Civil Union     Spouse name: Not on file    Number of children: Not on file    Years of education: Not on file    Highest education level: Not on file   Occupational History    Not on file   Social Needs    Financial resource strain: Not on file    Food insecurity     Worry: Not on file     Inability: Not on file   [employer] needs     Medical: Not on file     Non-medical: Not on file   Tobacco Use    Smoking status: Never Smoker    Smokeless tobacco: Never Used   Substance and Sexual Activity    Alcohol use: Yes     Comment: occasional    Drug use: Not on file    Sexual activity: Not on file   Lifestyle    Physical activity     Days per week: Not on file     Minutes per session: Not on file    Stress: Not on file   Relationships    Social connections     Talks on phone: Not on file     Gets together: Not on file     Attends [employer] service: Not on file     Active member of club or organization: Not on file     Attends meetings of clubs or organizations: Not on file     Relationship status: Not on file    Intimate partner violence     Fear of current or ex partner: Not on file     Emotionally abused: Not on file     Physically abused: Not on file     Forced sexual activity: Not on file   Other Topics Concern    Not on file   Social History Narrative    Not on file      Medications and Allergies:     Current Outpatient Medications   Medication Sig Dispense Refill    glucosamine-chondroitin 500-400 MG tablet Take 1 tablet by mouth      minoxidil (ROGAINE) 2 % external solution Apply topically      Omega-3 Fatty Acids (FISH OIL OMEGA-3) 1000 MG CAPS Take 2 g by mouth daily       No current facility-administered medications for this visit  No Known Allergies   Immunizations:     Immunization History   Administered Date(s) Administered    INFLUENZA 11/04/2010, 10/03/2011, 10/20/2012, 10/03/2013, 10/15/2014, 09/19/2015, 10/13/2016, 09/20/2017, 09/16/2018    Influenza Split High Dose Preservative Free IM 09/19/2015, 10/13/2016    Pneumococcal Conjugate 13-Valent 07/29/2016, 09/01/2017    Pneumococcal Polysaccharide PPV23 07/13/2016, 09/24/2017    Tdap 04/20/2011, 09/17/2019    Zoster 01/20/2011    Zoster Vaccine Recombinant 05/14/2019, 08/18/2019    influenza, trivalent, adjuvanted 09/12/2019      Health Maintenance:         Topic Date Due    Colorectal Cancer Screening  04/06/2027    Hepatitis C Screening  Completed         Topic Date Due    Influenza Vaccine (1) 09/01/2020      Medicare Health Risk Assessment:     There were no vitals taken for this visit  Yunier Floyd is here for his Subsequent Wellness visit  Last Medicare Wellness visit information reviewed, patient interviewed and updates made to the record today  Health Risk Assessment:   Patient rates overall health as good   Patient feels that their physical health rating is slightly better  Eyesight was rated as same  Hearing was rated as same  Patient feels that their emotional and mental health rating is same  Pain experienced in the last 7 days has been some  Patient's pain rating has been 6/10  Patient states that he has experienced no weight loss or gain in last 6 months  Hip pain, CHS, MRI  ganglion cyst    Fall Risk Screening: In the past year, patient has experienced: no history of falling in past year      Home Safety:  Patient does not have trouble with stairs inside or outside of their home  Patient has working smoke alarms and has working carbon monoxide detector  Home safety hazards include: none  Nutrition:   Current diet is Regular  Medications:   Patient is currently taking over-the-counter supplements  OTC medications include: see medication list  Patient is able to manage medications  Activities of Daily Living (ADLs)/Instrumental Activities of Daily Living (IADLs):   Walk and transfer into and out of bed and chair?: Yes  Dress and groom yourself?: Yes    Bathe or shower yourself?: Yes    Feed yourself? Yes  Do your laundry/housekeeping?: Yes  Manage your money, pay your bills and track your expenses?: Yes  Make your own meals?: Yes    Do your own shopping?: Yes    Previous Hospitalizations:   Any hospitalizations or ED visits within the last 12 months?: Yes      Hospitalization Comments: Shoulder replacement R side, surgery went well    Advance Care Planning:   Living will: Yes    Durable POA for healthcare:  Yes    Advanced directive: Yes      Cognitive Screening:   Provider or family/friend/caregiver concerned regarding cognition?: No    PREVENTIVE SCREENINGS      Cardiovascular Screening:    General: Screening Current      Diabetes Screening:     General: Screening Current      Colorectal Cancer Screening:     General: Screening Current      Prostate Cancer Screening:    General: History Prostate Cancer      Osteoporosis Screening:    General: Screening Not Indicated      Abdominal Aortic Aneurysm (AAA) Screening:    Risk factors include: age between 73-67 yo        General: Screening Not Indicated      Lung Cancer Screening:     General: Screening Not Indicated      Hepatitis C Screening:    General: Screening Current      Rocio Andrade MD

## 2021-02-11 NOTE — PROGRESS NOTES
Assessment/Plan:    1  Medicare annual wellness visit, subsequent        Patient Instructions       Medicare Preventive Visit Patient Instructions  Thank you for completing your Welcome to Medicare Visit or Medicare Annual Wellness Visit today  Your next wellness visit will be due in one year (2/11/2022)  The screening/preventive services that you may require over the next 5-10 years are detailed below  Some tests may not apply to you based off risk factors and/or age  Screening tests ordered at today's visit but not completed yet may show as past due  Also, please note that scanned in results may not display below  Preventive Screenings:  Service Recommendations Previous Testing/Comments   Colorectal Cancer Screening  · Colonoscopy    · Fecal Occult Blood Test (FOBT)/Fecal Immunochemical Test (FIT)  · Fecal DNA/Cologuard Test  · Flexible Sigmoidoscopy Age: 54-65 years old   Colonoscopy: every 10 years (May be performed more frequently if at higher risk)  OR  FOBT/FIT: every 1 year  OR  Cologuard: every 3 years  OR  Sigmoidoscopy: every 5 years  Screening may be recommended earlier than age 48 if at higher risk for colorectal cancer  Also, an individualized decision between you and your healthcare provider will decide whether screening between the ages of 74-80 would be appropriate   Colonoscopy: 04/06/2017  FOBT/FIT: Not on file  Cologuard: Not on file  Sigmoidoscopy: Not on file         Prostate Cancer Screening Individualized decision between patient and health care provider in men between ages of 53-78   Medicare will cover every 12 months beginning on the day after your 50th birthday PSA: <0 1 ng/mL          Hepatitis C Screening Once for adults born between 1945 and 1965  More frequently in patients at high risk for Hepatitis C Hep C Antibody: 09/10/2020       Diabetes Screening 1-2 times per year if you're at risk for diabetes or have pre-diabetes Fasting glucose: 107 mg/dL   A1C: 5 0 %       Cholesterol Screening Once every 5 years if you don't have a lipid disorder  May order more often based on risk factors  Lipid panel: 01/21/2021          Other Preventive Screenings Covered by Medicare:  1  Abdominal Aortic Aneurysm (AAA) Screening: covered once if your at risk  You're considered to be at risk if you have a family history of AAA or a male between the age of 73-68 who smoking at least 100 cigarettes in your lifetime  2  Lung Cancer Screening: covers low dose CT scan once per year if you meet all of the following conditions: (1) Age 50-69; (2) No signs or symptoms of lung cancer; (3) Current smoker or have quit smoking within the last 15 years; (4) You have a tobacco smoking history of at least 30 pack years (packs per day x number of years you smoked); (5) You get a written order from a healthcare provider  3  Glaucoma Screening: covered annually if you're considered high risk: (1) You have diabetes OR (2) Family history of glaucoma OR (3)  aged 48 and older OR (3)  American aged 72 and older  3  Osteoporosis Screening: covered every 2 years if you meet one of the following conditions: (1) Have a vertebral abnormality; (2) On glucocorticoid therapy for more than 3 months; (3) Have primary hyperparathyroidism; (4) On osteoporosis medications and need to assess response to drug therapy  5  HIV Screening: covered annually if you're between the age of 12-76  Also covered annually if you are younger than 13 and older than 72 with risk factors for HIV infection  For pregnant patients, it is covered up to 3 times per pregnancy      Immunizations:  Immunization Recommendations   Influenza Vaccine Annual influenza vaccination during flu season is recommended for all persons aged >= 6 months who do not have contraindications   Pneumococcal Vaccine (Prevnar and Pneumovax)  * Prevnar = PCV13  * Pneumovax = PPSV23 Adults 25-60 years old: 1-3 doses may be recommended based on certain risk factors  Adults 72 years old: Prevnar (PCV13) vaccine recommended followed by Pneumovax (PPSV23) vaccine  If already received PPSV23 since turning 65, then PCV13 recommended at least one year after PPSV23 dose  Hepatitis B Vaccine 3 dose series if at intermediate or high risk (ex: diabetes, end stage renal disease, liver disease)   Tetanus (Td) Vaccine - COST NOT COVERED BY MEDICARE PART B Following completion of primary series, a booster dose should be given every 10 years to maintain immunity against tetanus  Td may also be given as tetanus wound prophylaxis  Tdap Vaccine - COST NOT COVERED BY MEDICARE PART B Recommended at least once for all adults  For pregnant patients, recommended with each pregnancy  Shingles Vaccine (Shingrix) - COST NOT COVERED BY MEDICARE PART B  2 shot series recommended in those aged 48 and above     Health Maintenance Due:      Topic Date Due    Colorectal Cancer Screening  04/06/2027    Hepatitis C Screening  Completed     Immunizations Due:      Topic Date Due    Influenza Vaccine (1) 09/01/2020     Advance Directives   What are advance directives? Advance directives are legal documents that state your wishes and plans for medical care  These plans are made ahead of time in case you lose your ability to make decisions for yourself  Advance directives can apply to any medical decision, such as the treatments you want, and if you want to donate organs  What are the types of advance directives? There are many types of advance directives, and each state has rules about how to use them  You may choose a combination of any of the following:  · Living will: This is a written record of the treatment you want  You can also choose which treatments you do not want, which to limit, and which to stop at a certain time  This includes surgery, medicine, IV fluid, and tube feedings  · Durable power of  for healthcare [facility]):   This is a written record that states who you want to make healthcare choices for you when you are unable to make them for yourself  This person, called a proxy, is usually a family member or a friend  You may choose more than 1 proxy  · Do not resuscitate (DNR) order:  A DNR order is used in case your heart stops beating or you stop breathing  It is a request not to have certain forms of treatment, such as CPR  A DNR order may be included in other types of advance directives  · Medical directive: This covers the care that you want if you are in a coma, near death, or unable to make decisions for yourself  You can list the treatments you want for each condition  Treatment may include pain medicine, surgery, blood transfusions, dialysis, IV or tube feedings, and a ventilator (breathing machine)  · Values history: This document has questions about your views, beliefs, and how you feel and think about life  This information can help others choose the care that you would choose  Why are advance directives important? An advance directive helps you control your care  Although spoken wishes may be used, it is better to have your wishes written down  Spoken wishes can be misunderstood, or not followed  Treatments may be given even if you do not want them  An advance directive may make it easier for your family to make difficult choices about your care  Weight Management   Why it is important to manage your weight:  Being overweight increases your risk of health conditions such as heart disease, high blood pressure, type 2 diabetes, and certain types of cancer  It can also increase your risk for osteoarthritis, sleep apnea, and other respiratory problems  Aim for a slow, steady weight loss  Even a small amount of weight loss can lower your risk of health problems  How to lose weight safely:  A safe and healthy way to lose weight is to eat fewer calories and get regular exercise   You can lose up about 1 pound a week by decreasing the number of calories you eat by 500 calories each day  Healthy meal plan for weight management:  A healthy meal plan includes a variety of foods, contains fewer calories, and helps you stay healthy  A healthy meal plan includes the following:  · Eat whole-grain foods more often  A healthy meal plan should contain fiber  Fiber is the part of grains, fruits, and vegetables that is not broken down by your body  Whole-grain foods are healthy and provide extra fiber in your diet  Some examples of whole-grain foods are whole-wheat breads and pastas, oatmeal, brown rice, and bulgur  · Eat a variety of vegetables every day  Include dark, leafy greens such as spinach, kale, ella greens, and mustard greens  Eat yellow and orange vegetables such as carrots, sweet potatoes, and winter squash  · Eat a variety of fruits every day  Choose fresh or canned fruit (canned in its own juice or light syrup) instead of juice  Fruit juice has very little or no fiber  · Eat low-fat dairy foods  Drink fat-free (skim) milk or 1% milk  Eat fat-free yogurt and low-fat cottage cheese  Try low-fat cheeses such as mozzarella and other reduced-fat cheeses  · Choose meat and other protein foods that are low in fat  Choose beans or other legumes such as split peas or lentils  Choose fish, skinless poultry (chicken or turkey), or lean cuts of red meat (beef or pork)  Before you cook meat or poultry, cut off any visible fat  · Use less fat and oil  Try baking foods instead of frying them  Add less fat, such as margarine, sour cream, regular salad dressing and mayonnaise to foods  Eat fewer high-fat foods  Some examples of high-fat foods include french fries, doughnuts, ice cream, and cakes  · Eat fewer sweets  Limit foods and drinks that are high in sugar  This includes candy, cookies, regular soda, and sweetened drinks  Exercise:  Exercise at least 30 minutes per day on most days of the week   Some examples of exercise include walking, biking, dancing, and swimming  You can also fit in more physical activity by taking the stairs instead of the elevator or parking farther away from stores  Ask your healthcare provider about the best exercise plan for you  © Copyright [employer] 2018 Information is for End User's use only and may not be sold, redistributed or otherwise used for commercial purposes  All illustrations and images included in CareNotes® are the copyrighted property of A D A M , Inc  or [employer]       No follow-ups on file  Subjective:      Patient ID: Cristina Chapa is a 79 y o  male  Chief Complaint   Patient presents with   Neva MAYFIELD    The following portions of the patient's history were reviewed and updated as appropriate: allergies, current medications, past family history, past medical history, past social history, past surgical history and problem list     Review of Systems      Current Outpatient Medications   Medication Sig Dispense Refill    glucosamine-chondroitin 500-400 MG tablet Take 1 tablet by mouth      NATURE-THROID 32 5 MG tablet TAKE 1 TABLET (32 5 MG TOTAL) BY MOUTH DAILY 90 tablet 1    Omega-3 Fatty Acids (FISH OIL OMEGA-3) 1000 MG CAPS Take 2 g by mouth daily       No current facility-administered medications for this visit  Objective: There were no vitals taken for this visit         Physical Exam           Jesica Mccarty MD

## 2021-02-11 NOTE — PATIENT INSTRUCTIONS

## 2021-02-11 NOTE — PROGRESS NOTES
Virtual Regular Visit      Assessment/Plan:    Problem List Items Addressed This Visit        Endocrine    Hypothyroidism    Relevant Orders    CBC and differential    Comprehensive metabolic panel    TSH, 3rd generation    Lipid panel       Genitourinary    Prostate cancer ([address] )       Other    Elevated blood sugar level    Relevant Orders    Hemoglobin A1C    Mass of left wrist      Other Visit Diagnoses     Medicare annual wellness visit, subsequent    -  Primary               Reason for visit is   Chief Complaint   Patient presents with    Virtual Awv    Virtual Regular Visit        Encounter provider Fabian Mauro MD    Provider located at [address] [place] [id number] [phone]      Recent Visits  No visits were found meeting these conditions  Showing recent visits within past 7 days and meeting all other requirements     Today's Visits  Date Type Provider Dept   02/11/21 Telemedicine MD Jim Thomason   Showing today's visits and meeting all other requirements     Future Appointments  No visits were found meeting these conditions  Showing future appointments within next 150 days and meeting all other requirements        The patient was identified by name and date of birth  Lola Hair was informed that this is a telemedicine visit and that the visit is being conducted through [address] [place] [address] and patient was informed that this is not a secure, HIPAA-compliant platform  He agrees to proceed     My office door was closed  No one else was in the room  He acknowledged consent and understanding of privacy and security of the video platform  The patient has agreed to participate and understands they can discontinue the visit at any time  Patient is aware this is a billable service  Subjective  Lola Hair is a 79 y o  male f/u   Here for f/u  Pt c/o b/l hip pain, working with ortho at [facility]   Also saw physiatry, unsure why there is a limp, thought to have a bone spur  No surgery suggested yet  May be having surgery for ganglion cyst on the L wrist, following with urology for prostate CA, s/p radiation treatment, PSA q 6months  Non smoker  Had his first COID vaccine on 2/4, second one 3/4, flu shot [facility] 8/2020  Past Medical History:   Diagnosis Date    Disease of thyroid gland     last assessed 10/13/2016    History of radiation therapy     Impression 02/18/2016, of thymus as an infant    Primary osteoarthritis of left knee     last assessed 04/13/2016       Past Surgical History:   Procedure Laterality Date    REPLACEMENT TOTAL KNEE Left     TOTAL SHOULDER REPLACEMENT Right        Current Outpatient Medications   Medication Sig Dispense Refill    glucosamine-chondroitin 500-400 MG tablet Take 1 tablet by mouth      minoxidil (ROGAINE) 2 % external solution Apply topically      Omega-3 Fatty Acids (FISH OIL OMEGA-3) 1000 MG CAPS Take 2 g by mouth daily       No current facility-administered medications for this visit  No Known Allergies    Review of Systems   Constitutional: Negative for fatigue and fever  HENT: Negative for congestion  Eyes: Negative for visual disturbance  Respiratory: Negative for chest tightness and shortness of breath  Cardiovascular: Negative for chest pain and palpitations  Gastrointestinal: Negative for abdominal pain  Genitourinary: Negative for difficulty urinating  Musculoskeletal: Positive for arthralgias  Neurological: Negative for headaches  Hematological: Does not bruise/bleed easily  Video Exam    There were no vitals filed for this visit  Physical Exam  Vitals signs reviewed  Constitutional:       Appearance: Normal appearance  Comments: 869/07  198#   HENT:      Head: Normocephalic and atraumatic  Neurological:      General: No focal deficit present  Mental Status: He is alert     Psychiatric:         Mood and Affect: Mood normal          Behavior: Behavior normal          Thought Content: Thought content normal          Judgment: Judgment normal           I spent 12 minutes directly with the patient during this visit      VIRTUAL VISIT [address] [facility] acknowledges that he has consented to an online visit or consultation  He understands that the online visit is based solely on information provided by him, and that, in the absence of a face-to-face physical evaluation by the physician, the diagnosis he receives is both limited and provisional in terms of accuracy and completeness  This is not intended to replace a full medical face-to-face evaluation by the physician  Jeremias Zacarias understands and accepts these terms

## 2021-02-17 DIAGNOSIS — E03.9 ACQUIRED HYPOTHYROIDISM: Primary | ICD-10-CM

## 2021-04-06 ENCOUNTER — APPOINTMENT (OUTPATIENT)
Dept: LAB | Facility: MEDICAL CENTER | Age: 71
End: 2021-04-06
Payer: COMMERCIAL

## 2021-04-06 DIAGNOSIS — E03.9 ACQUIRED HYPOTHYROIDISM: ICD-10-CM

## 2021-04-06 LAB
T3FREE SERPL-MCNC: 2.49 PG/ML (ref 2.3–4.2)
T4 FREE SERPL-MCNC: 0.77 NG/DL (ref 0.76–1.46)
T4 SERPL-MCNC: 4.8 UG/DL (ref 4.7–13.3)
TSH SERPL DL<=0.05 MIU/L-ACNC: 5.29 UIU/ML (ref 0.36–3.74)

## 2021-04-06 PROCEDURE — 84439 ASSAY OF FREE THYROXINE: CPT

## 2021-04-06 PROCEDURE — 84443 ASSAY THYROID STIM HORMONE: CPT

## 2021-04-06 PROCEDURE — 36415 COLL VENOUS BLD VENIPUNCTURE: CPT

## 2021-04-06 PROCEDURE — 84481 FREE ASSAY (FT-3): CPT

## 2021-08-25 ENCOUNTER — APPOINTMENT (OUTPATIENT)
Dept: LAB | Facility: MEDICAL CENTER | Age: 71
End: 2021-08-25
Payer: COMMERCIAL

## 2021-08-25 DIAGNOSIS — R73.9 ELEVATED BLOOD SUGAR LEVEL: ICD-10-CM

## 2021-08-25 DIAGNOSIS — E03.9 ACQUIRED HYPOTHYROIDISM: ICD-10-CM

## 2021-08-25 LAB
ALBUMIN SERPL BCP-MCNC: 3.9 G/DL (ref 3.5–5)
ALP SERPL-CCNC: 56 U/L (ref 46–116)
ALT SERPL W P-5'-P-CCNC: 39 U/L (ref 12–78)
ANION GAP SERPL CALCULATED.3IONS-SCNC: 4 MMOL/L (ref 4–13)
AST SERPL W P-5'-P-CCNC: 22 U/L (ref 5–45)
BASOPHILS # BLD AUTO: 0.03 THOUSANDS/ΜL (ref 0–0.1)
BASOPHILS NFR BLD AUTO: 1 % (ref 0–1)
BILIRUB SERPL-MCNC: 0.74 MG/DL (ref 0.2–1)
BUN SERPL-MCNC: 20 MG/DL (ref 5–25)
CALCIUM SERPL-MCNC: 8.9 MG/DL (ref 8.3–10.1)
CHLORIDE SERPL-SCNC: 107 MMOL/L (ref 100–108)
CHOLEST SERPL-MCNC: 216 MG/DL (ref 50–200)
CO2 SERPL-SCNC: 26 MMOL/L (ref 21–32)
CREAT SERPL-MCNC: 0.9 MG/DL (ref 0.6–1.3)
EOSINOPHIL # BLD AUTO: 0.19 THOUSAND/ΜL (ref 0–0.61)
EOSINOPHIL NFR BLD AUTO: 4 % (ref 0–6)
ERYTHROCYTE [DISTWIDTH] IN BLOOD BY AUTOMATED COUNT: 12.7 % (ref 11.6–15.1)
EST. AVERAGE GLUCOSE BLD GHB EST-MCNC: 97 MG/DL
GFR SERPL CREATININE-BSD FRML MDRD: 86 ML/MIN/1.73SQ M
GLUCOSE P FAST SERPL-MCNC: 99 MG/DL (ref 65–99)
HBA1C MFR BLD: 5 %
HCT VFR BLD AUTO: 43.3 % (ref 36.5–49.3)
HDLC SERPL-MCNC: 73 MG/DL
HGB BLD-MCNC: 14.9 G/DL (ref 12–17)
IMM GRANULOCYTES # BLD AUTO: 0.02 THOUSAND/UL (ref 0–0.2)
IMM GRANULOCYTES NFR BLD AUTO: 0 % (ref 0–2)
LDLC SERPL CALC-MCNC: 123 MG/DL (ref 0–100)
LYMPHOCYTES # BLD AUTO: 1.08 THOUSANDS/ΜL (ref 0.6–4.47)
LYMPHOCYTES NFR BLD AUTO: 24 % (ref 14–44)
MCH RBC QN AUTO: 33.9 PG (ref 26.8–34.3)
MCHC RBC AUTO-ENTMCNC: 34.4 G/DL (ref 31.4–37.4)
MCV RBC AUTO: 98 FL (ref 82–98)
MONOCYTES # BLD AUTO: 0.44 THOUSAND/ΜL (ref 0.17–1.22)
MONOCYTES NFR BLD AUTO: 10 % (ref 4–12)
NEUTROPHILS # BLD AUTO: 2.8 THOUSANDS/ΜL (ref 1.85–7.62)
NEUTS SEG NFR BLD AUTO: 61 % (ref 43–75)
NONHDLC SERPL-MCNC: 143 MG/DL
NRBC BLD AUTO-RTO: 0 /100 WBCS
PLATELET # BLD AUTO: 152 THOUSANDS/UL (ref 149–390)
PMV BLD AUTO: 10.8 FL (ref 8.9–12.7)
POTASSIUM SERPL-SCNC: 4.9 MMOL/L (ref 3.5–5.3)
PROT SERPL-MCNC: 7.3 G/DL (ref 6.4–8.2)
RBC # BLD AUTO: 4.4 MILLION/UL (ref 3.88–5.62)
SODIUM SERPL-SCNC: 137 MMOL/L (ref 136–145)
TRIGL SERPL-MCNC: 102 MG/DL
TSH SERPL DL<=0.05 MIU/L-ACNC: 3.35 UIU/ML (ref 0.36–3.74)
WBC # BLD AUTO: 4.56 THOUSAND/UL (ref 4.31–10.16)

## 2021-08-25 PROCEDURE — 84443 ASSAY THYROID STIM HORMONE: CPT

## 2021-08-25 PROCEDURE — 85025 COMPLETE CBC W/AUTO DIFF WBC: CPT

## 2021-08-25 PROCEDURE — 83036 HEMOGLOBIN GLYCOSYLATED A1C: CPT

## 2021-08-25 PROCEDURE — 80053 COMPREHEN METABOLIC PANEL: CPT

## 2021-08-25 PROCEDURE — 80061 LIPID PANEL: CPT

## 2021-08-25 PROCEDURE — 36415 COLL VENOUS BLD VENIPUNCTURE: CPT

## 2021-10-06 ENCOUNTER — TELEPHONE (OUTPATIENT)
Dept: UROLOGY | Facility: MEDICAL CENTER | Age: 71
End: 2021-10-06

## 2021-11-02 ENCOUNTER — OFFICE VISIT (OUTPATIENT)
Dept: FAMILY MEDICINE CLINIC | Facility: CLINIC | Age: 71
End: 2021-11-02
Payer: COMMERCIAL

## 2021-11-02 VITALS
BODY MASS INDEX: 28.81 KG/M2 | WEIGHT: 205.8 LBS | TEMPERATURE: 97.1 F | DIASTOLIC BLOOD PRESSURE: 74 MMHG | SYSTOLIC BLOOD PRESSURE: 122 MMHG | HEART RATE: 76 BPM | HEIGHT: 71 IN | OXYGEN SATURATION: 98 %

## 2021-11-02 DIAGNOSIS — E78.5 HYPERLIPIDEMIA, UNSPECIFIED HYPERLIPIDEMIA TYPE: ICD-10-CM

## 2021-11-02 DIAGNOSIS — E03.9 ACQUIRED HYPOTHYROIDISM: Primary | ICD-10-CM

## 2021-11-02 DIAGNOSIS — C61 PROSTATE CANCER (HCC): ICD-10-CM

## 2021-11-02 PROBLEM — M05.9 RHEUMATOID ARTHRITIS WITH POSITIVE RHEUMATOID FACTOR (HCC): Status: RESOLVED | Noted: 2020-08-11 | Resolved: 2021-11-02

## 2021-11-02 PROBLEM — M25.562 PAIN IN LEFT KNEE: Status: RESOLVED | Noted: 2018-03-16 | Resolved: 2021-11-02

## 2021-11-02 PROBLEM — Z96.652 STATUS POST TOTAL LEFT KNEE REPLACEMENT: Status: ACTIVE | Noted: 2018-05-02

## 2021-11-02 PROCEDURE — 3008F BODY MASS INDEX DOCD: CPT | Performed by: PHYSICIAN ASSISTANT

## 2021-11-02 PROCEDURE — 1036F TOBACCO NON-USER: CPT | Performed by: PHYSICIAN ASSISTANT

## 2021-11-02 PROCEDURE — 99214 OFFICE O/P EST MOD 30 MIN: CPT | Performed by: PHYSICIAN ASSISTANT

## 2021-11-02 PROCEDURE — 1160F RVW MEDS BY RX/DR IN RCRD: CPT | Performed by: PHYSICIAN ASSISTANT

## 2021-11-02 RX ORDER — NAPROXEN 500 MG/1
TABLET ORAL
COMMUNITY
Start: 2021-10-18

## 2021-12-10 ENCOUNTER — OFFICE VISIT (OUTPATIENT)
Dept: UROLOGY | Facility: MEDICAL CENTER | Age: 71
End: 2021-12-10
Payer: COMMERCIAL

## 2021-12-10 VITALS
BODY MASS INDEX: 28 KG/M2 | SYSTOLIC BLOOD PRESSURE: 128 MMHG | DIASTOLIC BLOOD PRESSURE: 74 MMHG | HEIGHT: 71 IN | WEIGHT: 200 LBS

## 2021-12-10 DIAGNOSIS — Z85.46 HISTORY OF PROSTATE CANCER: Primary | ICD-10-CM

## 2021-12-10 LAB
SL AMB  POCT GLUCOSE, UA: ABNORMAL
SL AMB LEUKOCYTE ESTERASE,UA: ABNORMAL
SL AMB POCT BILIRUBIN,UA: ABNORMAL
SL AMB POCT BLOOD,UA: ABNORMAL
SL AMB POCT CLARITY,UA: CLEAR
SL AMB POCT COLOR,UA: YELLOW
SL AMB POCT KETONES,UA: ABNORMAL
SL AMB POCT NITRITE,UA: ABNORMAL
SL AMB POCT PH,UA: 5
SL AMB POCT SPECIFIC GRAVITY,UA: 1.03
SL AMB POCT URINE PROTEIN: ABNORMAL
SL AMB POCT UROBILINOGEN: 0.2

## 2021-12-10 PROCEDURE — 99214 OFFICE O/P EST MOD 30 MIN: CPT | Performed by: UROLOGY

## 2021-12-10 PROCEDURE — 81003 URINALYSIS AUTO W/O SCOPE: CPT | Performed by: UROLOGY

## 2021-12-10 PROCEDURE — 3008F BODY MASS INDEX DOCD: CPT | Performed by: UROLOGY

## 2021-12-10 PROCEDURE — 1160F RVW MEDS BY RX/DR IN RCRD: CPT | Performed by: UROLOGY

## 2021-12-10 PROCEDURE — 1036F TOBACCO NON-USER: CPT | Performed by: UROLOGY

## 2021-12-10 RX ORDER — ACETAMINOPHEN 500 MG
500 TABLET ORAL EVERY 6 HOURS PRN
COMMUNITY

## 2021-12-29 ENCOUNTER — APPOINTMENT (OUTPATIENT)
Dept: LAB | Facility: MEDICAL CENTER | Age: 71
End: 2021-12-29
Payer: COMMERCIAL

## 2021-12-29 DIAGNOSIS — Z85.46 HISTORY OF PROSTATE CANCER: ICD-10-CM

## 2021-12-29 LAB — PSA SERPL-MCNC: 1.6 NG/ML (ref 0–4)

## 2021-12-29 PROCEDURE — 84153 ASSAY OF PSA TOTAL: CPT

## 2022-05-20 ENCOUNTER — TELEPHONE (OUTPATIENT)
Dept: FAMILY MEDICINE CLINIC | Facility: CLINIC | Age: 72
End: 2022-05-20

## 2022-05-20 NOTE — TELEPHONE ENCOUNTER
Patient scheduled his awv with you on Wednesday 5/25  He wants to know if he should complete any labs for you prior  If so, please add to his chart

## 2022-05-23 ENCOUNTER — RA CDI HCC (OUTPATIENT)
Dept: OTHER | Facility: HOSPITAL | Age: 72
End: 2022-05-23

## 2022-05-23 DIAGNOSIS — R73.9 ELEVATED BLOOD SUGAR LEVEL: ICD-10-CM

## 2022-05-23 DIAGNOSIS — E78.5 HYPERLIPIDEMIA, UNSPECIFIED HYPERLIPIDEMIA TYPE: ICD-10-CM

## 2022-05-23 DIAGNOSIS — E03.9 ACQUIRED HYPOTHYROIDISM: Primary | ICD-10-CM

## 2022-05-23 NOTE — PROGRESS NOTES
[facility]  coding opportunities       Chart reviewed, no opportunity found:   [address]        Patients Insurance     Medicare Insurance: [employer]

## 2022-05-24 ENCOUNTER — APPOINTMENT (OUTPATIENT)
Dept: LAB | Facility: MEDICAL CENTER | Age: 72
End: 2022-05-24
Payer: COMMERCIAL

## 2022-05-24 DIAGNOSIS — E03.9 ACQUIRED HYPOTHYROIDISM: ICD-10-CM

## 2022-05-24 DIAGNOSIS — E78.5 HYPERLIPIDEMIA, UNSPECIFIED HYPERLIPIDEMIA TYPE: ICD-10-CM

## 2022-05-24 DIAGNOSIS — R73.9 ELEVATED BLOOD SUGAR LEVEL: ICD-10-CM

## 2022-05-24 LAB
ALBUMIN SERPL BCP-MCNC: 4 G/DL (ref 3.5–5)
ALP SERPL-CCNC: 54 U/L (ref 46–116)
ALT SERPL W P-5'-P-CCNC: 31 U/L (ref 12–78)
ANION GAP SERPL CALCULATED.3IONS-SCNC: 10 MMOL/L (ref 4–13)
AST SERPL W P-5'-P-CCNC: 22 U/L (ref 5–45)
BASOPHILS # BLD AUTO: 0.02 THOUSANDS/ΜL (ref 0–0.1)
BASOPHILS NFR BLD AUTO: 0 % (ref 0–1)
BILIRUB SERPL-MCNC: 0.87 MG/DL (ref 0.2–1)
BUN SERPL-MCNC: 20 MG/DL (ref 5–25)
CALCIUM SERPL-MCNC: 9.1 MG/DL (ref 8.3–10.1)
CHLORIDE SERPL-SCNC: 105 MMOL/L (ref 100–108)
CHOLEST SERPL-MCNC: 212 MG/DL
CO2 SERPL-SCNC: 23 MMOL/L (ref 21–32)
CREAT SERPL-MCNC: 1.02 MG/DL (ref 0.6–1.3)
EOSINOPHIL # BLD AUTO: 0.2 THOUSAND/ΜL (ref 0–0.61)
EOSINOPHIL NFR BLD AUTO: 4 % (ref 0–6)
ERYTHROCYTE [DISTWIDTH] IN BLOOD BY AUTOMATED COUNT: 12.7 % (ref 11.6–15.1)
EST. AVERAGE GLUCOSE BLD GHB EST-MCNC: 100 MG/DL
GFR SERPL CREATININE-BSD FRML MDRD: 73 ML/MIN/1.73SQ M
GLUCOSE P FAST SERPL-MCNC: 116 MG/DL (ref 65–99)
HBA1C MFR BLD: 5.1 %
HCT VFR BLD AUTO: 45.4 % (ref 36.5–49.3)
HDLC SERPL-MCNC: 77 MG/DL
HGB BLD-MCNC: 15.6 G/DL (ref 12–17)
IMM GRANULOCYTES # BLD AUTO: 0.01 THOUSAND/UL (ref 0–0.2)
IMM GRANULOCYTES NFR BLD AUTO: 0 % (ref 0–2)
LDLC SERPL CALC-MCNC: 111 MG/DL (ref 0–100)
LYMPHOCYTES # BLD AUTO: 1.07 THOUSANDS/ΜL (ref 0.6–4.47)
LYMPHOCYTES NFR BLD AUTO: 22 % (ref 14–44)
MCH RBC QN AUTO: 33.3 PG (ref 26.8–34.3)
MCHC RBC AUTO-ENTMCNC: 34.4 G/DL (ref 31.4–37.4)
MCV RBC AUTO: 97 FL (ref 82–98)
MONOCYTES # BLD AUTO: 0.43 THOUSAND/ΜL (ref 0.17–1.22)
MONOCYTES NFR BLD AUTO: 9 % (ref 4–12)
NEUTROPHILS # BLD AUTO: 3.11 THOUSANDS/ΜL (ref 1.85–7.62)
NEUTS SEG NFR BLD AUTO: 65 % (ref 43–75)
NONHDLC SERPL-MCNC: 135 MG/DL
NRBC BLD AUTO-RTO: 0 /100 WBCS
PLATELET # BLD AUTO: 149 THOUSANDS/UL (ref 149–390)
PMV BLD AUTO: 11.3 FL (ref 8.9–12.7)
POTASSIUM SERPL-SCNC: 4.4 MMOL/L (ref 3.5–5.3)
PROT SERPL-MCNC: 7.7 G/DL (ref 6.4–8.2)
RBC # BLD AUTO: 4.68 MILLION/UL (ref 3.88–5.62)
SODIUM SERPL-SCNC: 138 MMOL/L (ref 136–145)
TRIGL SERPL-MCNC: 119 MG/DL
TSH SERPL DL<=0.05 MIU/L-ACNC: 2.83 UIU/ML (ref 0.45–4.5)
WBC # BLD AUTO: 4.84 THOUSAND/UL (ref 4.31–10.16)

## 2022-05-24 PROCEDURE — 36415 COLL VENOUS BLD VENIPUNCTURE: CPT

## 2022-05-24 PROCEDURE — 80061 LIPID PANEL: CPT

## 2022-05-24 PROCEDURE — 84443 ASSAY THYROID STIM HORMONE: CPT

## 2022-05-24 PROCEDURE — 85025 COMPLETE CBC W/AUTO DIFF WBC: CPT

## 2022-05-24 PROCEDURE — 80053 COMPREHEN METABOLIC PANEL: CPT

## 2022-05-24 PROCEDURE — 83036 HEMOGLOBIN GLYCOSYLATED A1C: CPT

## 2022-05-25 ENCOUNTER — OFFICE VISIT (OUTPATIENT)
Dept: FAMILY MEDICINE CLINIC | Facility: CLINIC | Age: 72
End: 2022-05-25
Payer: COMMERCIAL

## 2022-05-25 VITALS
WEIGHT: 194.6 LBS | DIASTOLIC BLOOD PRESSURE: 78 MMHG | BODY MASS INDEX: 29.49 KG/M2 | HEART RATE: 80 BPM | OXYGEN SATURATION: 98 % | HEIGHT: 68 IN | TEMPERATURE: 97 F | SYSTOLIC BLOOD PRESSURE: 130 MMHG

## 2022-05-25 DIAGNOSIS — C61 PROSTATE CANCER (HCC): ICD-10-CM

## 2022-05-25 DIAGNOSIS — Z00.00 MEDICARE ANNUAL WELLNESS VISIT, SUBSEQUENT: Primary | ICD-10-CM

## 2022-05-25 DIAGNOSIS — E03.9 ACQUIRED HYPOTHYROIDISM: ICD-10-CM

## 2022-05-25 DIAGNOSIS — R73.9 ELEVATED BLOOD SUGAR LEVEL: ICD-10-CM

## 2022-05-25 PROCEDURE — 1003F LEVEL OF ACTIVITY ASSESS: CPT | Performed by: FAMILY MEDICINE

## 2022-05-25 PROCEDURE — 1170F FXNL STATUS ASSESSED: CPT | Performed by: FAMILY MEDICINE

## 2022-05-25 PROCEDURE — 3008F BODY MASS INDEX DOCD: CPT | Performed by: FAMILY MEDICINE

## 2022-05-25 PROCEDURE — G0439 PPPS, SUBSEQ VISIT: HCPCS | Performed by: FAMILY MEDICINE

## 2022-05-25 PROCEDURE — 3288F FALL RISK ASSESSMENT DOCD: CPT | Performed by: FAMILY MEDICINE

## 2022-05-25 PROCEDURE — 99214 OFFICE O/P EST MOD 30 MIN: CPT | Performed by: FAMILY MEDICINE

## 2022-05-25 PROCEDURE — 1160F RVW MEDS BY RX/DR IN RCRD: CPT | Performed by: FAMILY MEDICINE

## 2022-05-25 PROCEDURE — 1125F AMNT PAIN NOTED PAIN PRSNT: CPT | Performed by: FAMILY MEDICINE

## 2022-05-25 PROCEDURE — 3725F SCREEN DEPRESSION PERFORMED: CPT | Performed by: FAMILY MEDICINE

## 2022-05-25 NOTE — PROGRESS NOTES
Assessment and Plan:     Problem List Items Addressed This Visit        Endocrine    Hypothyroidism      Other Visit Diagnoses     Medicare annual wellness visit, subsequent    -  Primary    Mixed hyperlipidemia            BMI Counseling: Body mass index is 30 03 kg/m²  The BMI is above normal  Nutrition recommendations include decreasing portion sizes, encouraging healthy choices of fruits and vegetables, decreasing fast food intake, consuming healthier snacks, limiting drinks that contain sugar and moderation in carbohydrate intake  Exercise recommendations include moderate physical activity 150 minutes/week, exercising 3-5 times per week and strength training exercises  No pharmacotherapy was ordered  Patient referred to PCP  Rationale for BMI follow-up plan is due to patient being overweight or obese  Depression Screening and Follow-up Plan: Patient was screened for depression during today's encounter  They screened negative with a PHQ-2 score of 1  Preventive health issues were discussed with patient, and age appropriate screening tests were ordered as noted in patient's After Visit Summary  Personalized health advice and appropriate referrals for health education or preventive services given if needed, as noted in patient's After Visit Summary       History of Present Illness:     Patient presents for Medicare Annual Wellness visit    Patient Care Team:  Dereck Anaya MD as PCP - General Eva Alfaro PASHEELA     Problem List:     Patient Active Problem List   Diagnosis    Status post total left knee replacement    Elevated prostate specific antigen (PSA)    Hypothyroidism    Prostate cancer ([address] )    Mass of left wrist    History of prostate cancer      Past Medical and Surgical History:     Past Medical History:   Diagnosis Date    Disease of thyroid gland     last assessed 10/13/2016    History of radiation therapy     Impression 02/18/2016, of thymus as an infant    Primary osteoarthritis of left knee     last assessed 04/13/2016    Prostate cancer [facility])      Past Surgical History:   Procedure Laterality Date    COLONOSCOPY      PROSTATE BIOPSY      REPLACEMENT TOTAL KNEE Left     TOTAL SHOULDER REPLACEMENT Right       Family History:     Family History   Problem Relation Age of Onset    Heart disease Father     Heart failure Father     Dementia Mother       Social History:     Social History     Socioeconomic History    Marital status: /Civil Union     Spouse name: None    Number of children: None    Years of education: None    Highest education level: None   Occupational History    None   Tobacco Use    Smoking status: Never Smoker    Smokeless tobacco: Never Used   Substance and Sexual Activity    Alcohol use: Yes     Comment: occasional    Drug use: None    Sexual activity: None   Other Topics Concern    None   Social History Narrative    None     Social Determinants of Health     Financial Resource Strain: Not on file   Food Insecurity: Not on file   Transportation Needs: Not on file   Physical Activity: Not on file   Stress: Not on file   Social Connections: Not on file   Intimate Partner Violence: Not on file   Housing Stability: Not on file      Medications and Allergies:     Current Outpatient Medications   Medication Sig Dispense Refill    acetaminophen (TYLENOL) 500 mg tablet Take 500 mg by mouth every 6 (six) hours as needed for mild pain      minoxidil (ROGAINE) 2 % external solution Apply topically      Misc Natural Products (GLUCOSAMINE CHONDROITIN ADV PO) Take 1 tablet by mouth daily      naproxen (NAPROSYN) 500 mg tablet       Omega-3 Fatty Acids (FISH OIL OMEGA-3) 1000 MG CAPS Take 2 g by mouth daily       No current facility-administered medications for this visit       No Known Allergies   Immunizations:     Immunization History   Administered Date(s) Administered    COVID-19 MODERNA VACC 0 5 ML IM 02/04/2021, 03/03/2021    INFLUENZA 11/04/2010, 10/03/2011, 10/20/2012, 10/03/2013, 10/15/2014, 09/19/2015, 10/13/2016, 09/20/2017, 09/16/2018, 08/29/2021    Influenza Split High Dose Preservative Free IM 09/19/2015, 10/13/2016, 08/20/2020    Influenza, high dose seasonal 0 7 mL 08/20/2020    Pneumococcal Conjugate 13-Valent 07/29/2016, 09/01/2017    Pneumococcal Polysaccharide PPV23 07/13/2016, 09/24/2017    Tdap 04/20/2011, 09/17/2019    Zoster 01/20/2011    Zoster Vaccine Recombinant 05/14/2019, 08/18/2019    influenza, trivalent, adjuvanted 09/12/2019      Health Maintenance:         Topic Date Due    Colorectal Cancer Screening  04/06/2027    Hepatitis C Screening  Completed         Topic Date Due    COVID-19 Vaccine (3 - Booster for Moderna series) 08/03/2021      Medicare Health Risk Assessment:     /78 (BP Location: Right arm, Patient Position: Sitting, Cuff Size: Standard)   Pulse 80   Temp (!) 97 °F (36 1 °C)   Ht 5' 7 5" (1 715 m)   Wt 88 3 kg (194 lb 9 6 oz)   SpO2 98%   BMI 30 03 kg/m²      Marcio Mehta is here for his Subsequent Wellness visit  Last Medicare Wellness visit information reviewed, patient interviewed, no change since last AWV  Health Risk Assessment:   Patient rates overall health as good  Patient feels that their physical health rating is same  Patient is satisfied with their life  Eyesight was rated as same  Hearing was rated as same  Patient feels that their emotional and mental health rating is same  Patients states they are never, rarely angry  Patient states they are never, rarely unusually tired/fatigued  Pain experienced in the last 7 days has been none  Patient states that he has experienced no weight loss or gain in last 6 months  Depression Screening:   PHQ-2 Score: 1      Fall Risk Screening: In the past year, patient has experienced: no history of falling in past year      Home Safety:  Patient does not have trouble with stairs inside or outside of their home   Patient has working smoke alarms and has working carbon monoxide detector  Home safety hazards include: none  Nutrition:   Current diet is Regular  Medications:   Patient is currently taking over-the-counter supplements  OTC medications include: see medication list  Patient is able to manage medications  Activities of Daily Living (ADLs)/Instrumental Activities of Daily Living (IADLs):   Walk and transfer into and out of bed and chair?: Yes  Dress and groom yourself?: Yes    Bathe or shower yourself?: Yes    Feed yourself? Yes  Do your laundry/housekeeping?: Yes  Manage your money, pay your bills and track your expenses?: Yes  Make your own meals?: Yes    Do your own shopping?: Yes    Previous Hospitalizations:   Any hospitalizations or ED visits within the last 12 months?: No      Advance Care Planning:   Living will: Yes    Advanced directive: Yes      Cognitive Screening:   Provider or family/friend/caregiver concerned regarding cognition?: No    PREVENTIVE SCREENINGS      Cardiovascular Screening:    General: Screening Not Indicated and History Lipid Disorder      Diabetes Screening:     General: Screening Current      Colorectal Cancer Screening:     General: Screening Current      Prostate Cancer Screening:    General: History Prostate Cancer      Osteoporosis Screening:    General: Screening Not Indicated      Abdominal Aortic Aneurysm (AAA) Screening:    Risk factors include: age between 73-67 yo        General: Screening Not Indicated      Lung Cancer Screening:     General: Screening Not Indicated      Hepatitis C Screening:    General: Screening Current    Screening, Brief Intervention, and Referral to Treatment (SBIRT)    Screening  Typical number of drinks in a day: 1  Typical number of drinks in a week: 7  Interpretation: Low risk drinking behavior      Single Item Drug Screening:  How often have you used an illegal drug (including marijuana) or a prescription medication for non-medical reasons in the past year? never    Single Item Drug Screen Score: 0  Interpretation: Negative screen for possible drug use disorder      Lynette Garcia MD

## 2022-05-25 NOTE — PROGRESS NOTES
Assessment/Plan:    1  Medicare annual wellness visit, subsequent    2  Acquired hypothyroidism  Comments:  resolved without meds  Orders:  -     CBC and differential; Future; Expected date: 11/21/2022  -     Comprehensive metabolic panel; Future; Expected date: 11/21/2022  -     TSH, 3rd generation; Future; Expected date: 11/21/2022  -     Lipid panel; Future; Expected date: 11/21/2022    3  Prostate cancer ([facility] )    4  Elevated blood sugar level  -     CBC and differential; Future; Expected date: 11/21/2022  -     Comprehensive metabolic panel; Future; Expected date: 11/21/2022  -     TSH, 3rd generation; Future; Expected date: 11/21/2022  -     Lipid panel; Future; Expected date: 11/21/2022  -     Hemoglobin A1C; Future; Expected date: 11/21/2022        There are no Patient Instructions on file for this visit  Return in about 1 year (around 5/25/2023)  Subjective:      Patient ID: Daniel Mcgraw is a 70 y o  male  Chief Complaint   Patient presents with   [facility] Wellness Visit       Here for f/u  Labs reviewed  TSH normal  Pt has been off the naturethroid for 2 years mainly due to not being available  Lipids at goal, HDL high  Pt seeing urology for prostate CA, s/p radiation, had one lupron shot, watching PSA every 6 months  A1C stable at 5 1 despite elevated fasting glucose  Having issues with L4-L5, seeing physiatry  He mentioned nerve ablation, pt is hesitant  Doctor is retiring soon and pt was referred on to his partners  Pt would otherwise be continuing with epidural inujections and nerve blocks  Seeing chiro with minimal relief  The following portions of the patient's history were reviewed and updated as appropriate: allergies, current medications, past family history, past medical history, past social history, past surgical history and problem list     Review of Systems   Constitutional: Negative for fatigue and fever  HENT: Negative for congestion      Eyes: Negative for visual disturbance  Respiratory: Negative for chest tightness and shortness of breath  Cardiovascular: Negative for chest pain and palpitations  Gastrointestinal: Negative for abdominal pain  Genitourinary: Negative for difficulty urinating  Musculoskeletal: Negative for arthralgias  Neurological: Negative for headaches  Hematological: Does not bruise/bleed easily  Current Outpatient Medications   Medication Sig Dispense Refill    acetaminophen (TYLENOL) 500 mg tablet Take 500 mg by mouth every 6 (six) hours as needed for mild pain      minoxidil (ROGAINE) 2 % external solution Apply topically      Misc Natural Products (GLUCOSAMINE CHONDROITIN ADV PO) Take 1 tablet by mouth daily      naproxen (NAPROSYN) 500 mg tablet       Omega-3 Fatty Acids (FISH OIL OMEGA-3) 1000 MG CAPS Take 2 g by mouth daily       No current facility-administered medications for this visit  Objective:    /78 (BP Location: Right arm, Patient Position: Sitting, Cuff Size: Standard)   Pulse 80   Temp (!) 97 °F (36 1 °C)   Ht 5' 7 5" (1 715 m)   Wt 88 3 kg (194 lb 9 6 oz)   SpO2 98%   BMI 30 03 kg/m²        Physical Exam  Vitals reviewed  Constitutional:       Appearance: Normal appearance  He is well-developed  Cardiovascular:      Rate and Rhythm: Normal rate and regular rhythm  Heart sounds: Normal heart sounds  Pulmonary:      Effort: Pulmonary effort is normal       Breath sounds: Normal breath sounds  Skin:     General: Skin is warm  Neurological:      Mental Status: He is alert and oriented to person, place, and time  Psychiatric:         Mood and Affect: Mood normal          Behavior: Behavior normal          Thought Content:  Thought content normal          Judgment: Judgment normal                 Lynette Garcia MD

## 2022-06-29 ENCOUNTER — APPOINTMENT (OUTPATIENT)
Dept: LAB | Facility: MEDICAL CENTER | Age: 72
End: 2022-06-29
Payer: COMMERCIAL

## 2022-07-07 ENCOUNTER — TELEPHONE (OUTPATIENT)
Dept: UROLOGY | Facility: MEDICAL CENTER | Age: 72
End: 2022-07-07

## 2022-07-07 NOTE — TELEPHONE ENCOUNTER
Please verify coverage/ authorization for 6 month Lupron  Patient is schedule with Dr Ana Cabrera on 7/14/22

## 2022-07-14 ENCOUNTER — OFFICE VISIT (OUTPATIENT)
Dept: UROLOGY | Facility: MEDICAL CENTER | Age: 72
End: 2022-07-14
Payer: COMMERCIAL

## 2022-07-14 VITALS
SYSTOLIC BLOOD PRESSURE: 130 MMHG | HEIGHT: 71 IN | OXYGEN SATURATION: 97 % | HEART RATE: 85 BPM | BODY MASS INDEX: 27.16 KG/M2 | DIASTOLIC BLOOD PRESSURE: 82 MMHG | WEIGHT: 194 LBS

## 2022-07-14 DIAGNOSIS — C61 PROSTATE CANCER (HCC): Primary | ICD-10-CM

## 2022-07-14 PROCEDURE — 99214 OFFICE O/P EST MOD 30 MIN: CPT | Performed by: UROLOGY

## 2022-07-14 PROCEDURE — 1160F RVW MEDS BY RX/DR IN RCRD: CPT | Performed by: UROLOGY

## 2022-07-14 NOTE — PROGRESS NOTES
HISTORY:    Follow-up prostate cancer     Prostate biopsy done around September 2019 at [facility] ([facility])  I cannot find those path results in the [address]  everywhere epic folder  PSA was 9 4 in May 2019 before the biopsy  Likewise, I cannot find any documentation of imaging before that prostate cancer treatment  Again, likely done a coordinated, and results are not in the [address] [place] [address] folder    Patient underwent radiation therapy finishing in early 2020 through [facility]  Radiation went very well, no long-term side effects in terms of rectum or bladder      Received one  shot of Lupron, presumably a six month dose, administered in late 2019  After his treatment, PSA was less than 0 1 in June 2020  After that, his appointments were delayed at [address] [place] [address], he is not certain why  He first saw me in December 2021  At that appointment, his PSA was 1 6  So, he went 1 5 years without a PSA between June 2020, and December 2021        PSA is rising further:  PSA 3 6 in June 2022   1 6 in December 2021   Less than 0 1 in June 2020, from that one dose Lupron     9 4 in May 2019   6 8 in May 2018          ASSESSMENT / PLAN:    Patient wife are understandably very upset at the possibility that he could have recurrence of the cancer this soon after treatment  1  Patient's wife will try to get the initial pathology report through [address] [place] [address] medical records  At this point, the Cora score is academic, but still important info  2   long discussion regarding possibility that this PSA rising means there persistent prostate cancer:   Either a failure of treatment of the primary cancer in the prostate, or a metastatic focus outside the prostate  Repeat PSA in a few weeks    3  PMSA PET scan will be done    4  Depending on the results of that scan,  he may need a prostate biopsy to see if there is persistent cancer in the prostate gland  And, we may need a MRI to determine what type of biopsy      5  If the PET scan suggests there is cancer outside the prostate, we will discuss further imaging of that and treatment options  The following portions of the patient's history were reviewed and updated as appropriate: allergies, current medications, past family history, past medical history, past social history, past surgical history and problem list     Review of Systems   All other systems reviewed and are negative  Objective:     Physical Exam  Constitutional:       Appearance: Normal appearance  Genitourinary:     Comments: Genitalia normal   Prostate minimally enlarged no nodule  Neurological:      Mental Status: He is alert  0   Lab Value Date/Time    PSA 3 6 06/29/2022 1039    PSA 1 6 12/29/2021 1221    PSA <0 1 07/20/2020 0838    PSA 9 4 (H) 05/29/2019 0858    PSA 6 8 (H) 05/03/2018 0808   ]  BUN   Date Value Ref Range Status   05/24/2022 20 5 - 25 mg/dL Final     Creatinine   Date Value Ref Range Status   05/24/2022 1 02 0 60 - 1 30 mg/dL Final     Comment:     Standardized to IDMS reference method     No components found for: CBC      Patient Active Problem List   Diagnosis    Status post total left knee replacement    Elevated prostate specific antigen (PSA)    Hypothyroidism    Prostate cancer ([facility] )    Mass of left wrist    History of prostate cancer        Diagnoses and all orders for this visit:    Prostate cancer ([facility] [address] )  -     NM pet ct tumor imaging whole body; Future  -     PSA Total, Diagnostic; Future           Patient ID: Keegan Bartholomew is a 67 y o  male        Current Outpatient Medications:     minoxidil (ROGAINE) 2 % external solution, Apply topically, Disp: , Rfl:     Misc Natural Products (GLUCOSAMINE CHONDROITIN ADV PO), Take 1 tablet by mouth daily, Disp: , Rfl:     Omega-3 Fatty Acids (FISH OIL OMEGA-3) 1000 MG CAPS, Take 2 g by mouth daily, Disp: , Rfl:     acetaminophen (TYLENOL) 500 mg tablet, Take 500 mg by mouth every 6 (six) hours as needed for mild pain (Patient not taking: Reported on 7/14/2022), Disp: , Rfl:     naproxen (NAPROSYN) 500 mg tablet, , Disp: , Rfl:     Past Medical History:   Diagnosis Date    Disease of thyroid gland     last assessed 10/13/2016    History of radiation therapy     Impression 02/18/2016, of thymus as an infant    Primary osteoarthritis of left knee     last assessed 04/13/2016    Prostate cancer [facility])        Past Surgical History:   Procedure Laterality Date    COLONOSCOPY      PROSTATE BIOPSY      REPLACEMENT TOTAL KNEE Left     TOTAL SHOULDER REPLACEMENT Right        Social History

## 2022-07-14 NOTE — TELEPHONE ENCOUNTER
Lupron denied but Eligard approved  Office stock ok to use  Auth#[id number] and is good from 7/8/22-1/8/2023

## 2022-07-14 NOTE — PATIENT INSTRUCTIONS
CALL [facility], [phone], [phone]   Ask for medical records    Tell them you need the pathology report done in around September or October of 2019 through [address] [place] [address]

## 2022-07-22 ENCOUNTER — TELEPHONE (OUTPATIENT)
Dept: UROLOGY | Facility: MEDICAL CENTER | Age: 72
End: 2022-07-22

## 2022-07-22 NOTE — TELEPHONE ENCOUNTER
Patient of Dr Liyah Donahue at [facility]    Patient called stating he will be faxing over the biopsy report right now for Dr Liyah Donahue to review  Patient insisted that Dr Liyah Donahue take a look at it today if possible  He will be faxing it to the [facility] office   [phone]/      Patient can be reached at [phone]

## 2022-07-22 NOTE — TELEPHONE ENCOUNTER
Call placed to patient and spoke with him  Informed him that fax has no been received at this time  Once it is received, the office will scan this into the chart and MD will review  I did inform patient that Dr Tiffanie Camacho is out of the office for 2 weeks and another provider is covering in his absence  Once received, report will be reviewed and determine timeframe for follow up with Dr Tiffanie Camacho  Pt is aware and will await a call next week to review pathology report  ,

## 2022-07-26 NOTE — TELEPHONE ENCOUNTER
Initial biopsy report from 2019 received in reviewed  [place] 3+3=6 and [place] 3+4=7 disease made notation in chart  Treated with XRT and Lupron x1  Rising PSA  PET-CT is planned  Dr Helen Paredes will follow up with the PET imaging results next week

## 2022-07-26 NOTE — TELEPHONE ENCOUNTER
Call placed to patient  He did not answer  [facility] informing patient of the recommendations of the AP at this time  Advised patient that once PET scan has been completed and reviewed, Dr Veronica Vee will review results and discuss next steps with him  Advised him to contact the office to review any questions or concerns he should have at this time

## 2022-07-29 ENCOUNTER — HOSPITAL ENCOUNTER (OUTPATIENT)
Dept: RADIOLOGY | Age: 72
Discharge: HOME | End: 2022-07-29
Payer: COMMERCIAL

## 2022-07-29 DIAGNOSIS — C61 MALIGNANT NEOPLASM OF PROSTATE (HCC): ICD-10-CM

## 2022-07-29 PROCEDURE — A9595 HB PIFLUFOLASTAT F-18, DIAGNOSTIC, 1 MILLICURIE: HCPCS

## 2022-07-29 PROCEDURE — 78815 PET IMAGE W/CT SKULL-THIGH: CPT

## 2022-07-29 NOTE — LETTER
[address] [place] [address]  [address] [facility] [address]      August 3, 2022    MRN: [id number]     Phone: [phone]     Dear Mr Sanjiv Ryan recently had a(n) Nuclear Medicine performed on 7/29/2022 at  [address] [place] [address] that was requested by Garry Durant MD  The study was reviewed by a radiologist, which is a physician who specializes in medical imaging  The radiologist issued a report describing his or her findings  In that report there was a finding that the radiologist felt warranted further discussion with your health care provider and that discussion would be beneficial to you  The results were sent to Garry Durant MD on 07/29/2022  3:20 PM  We recommend that you contact Garry Durant MD at [phone] or set up an appointment to discuss the results of the imaging test  If you have already heard from Garry Durant MD regarding the results of your study, you can disregard this letter  This letter is not meant to alarm you, but intended to encourage you to follow-up on your results with the provider that sent you for the imaging study  In addition, we have enclosed answers to frequently asked questions by other patients who have also received a letter to review results with their health care provider (see page two)  Thank you for choosing [address] [place] [address] for your medical imaging needs  FREQUENTLY ASKED QUESTIONS    1  Why am I receiving this letter? [facility] requires us to notify patients who have findings on imaging exams that may require more testing or follow-up with a health professional within the next 3 months          2  How serious is the finding on the imaging test?  This letter is sent to all patients who may need follow-up or more testing within the next 3 months  Receiving this letter does not necessarily mean you have a life-threatening imaging finding or disease  Recommendations in the radiologists imaging report are general in nature and it is up to your healthcare provider to say whether those recommendations make sense for your situation  You are strongly encouraged to talk to your health care provider about the results and ask whether additional steps need to be taken  3  Where can I get a copy of the final report for my recent radiology exam?  To get a full copy of the report you can access your records online at [email or web address] or please contact [address] [place] [address] Records Department at [phone] Monday through Friday between 8 am and 6 pm          4  What do I need to do now? Please contact your health care provider who requested the imaging study to discuss what further actions (if any) are needed  You may have already heard from (your ordering provider) in regard to this test in which case you can disregard this letter  NOTICE IN ACCORDANCE WITH THE [facility] PATIENT TEST RESULT INFORMATION ACT OF 2018    You are receiving this notice as a result of a determination by your diagnostic imaging service that further discussions of your test results are warranted and would be beneficial to you  The complete results of your test or tests have been or will be sent to the health care practitioner that ordered the test or tests  It is recommended that you contact your health care practitioner to discuss your results as soon as possible

## 2022-07-31 NOTE — PROGRESS NOTES
Telephone conversation with patient 2022 at 1:15 p m     I reviewed his case with him including a discussion of his initial pathology Cora grade his rising PSA to a most recent level of 3 6 up from  in 2021  I discussed the PSMA PET scan results as indicating the presence of metabolically active prostate disease in the pelvic lymph nodes  I also discussed the potential for androgen deprivation therapy in the future  The patient indicated that when he obtained the PET scan he had mention to the PET scan technician that he had had a COVID-19 vaccination approximally 2 weeks prior  He indicated that the technician indicated that that can produce false positive readings on PET scanning  I indicated to the patient that I have not heard that and that the PSMA is quite specific for prostate cancer  In any event I suggested the followin  Consultation with Dr Eli Graff as to future treatment and or imaging  2  We discussed the possibility of a concomitantly multiparametric MRI to see whether there was cuong enlargement by that modality and also discussed a follow-up repeat PSMA PET scan in the next couple of months along with serial PSA testing to see whether there is progression of radiographic findings or regression of these findings as would be expected if COVID-19 vaccine had any impact  The patient will contact the office for an appointment to discuss this with Dr Eli Graff when he returns to the office

## 2022-08-01 ENCOUNTER — TELEPHONE (OUTPATIENT)
Dept: UROLOGY | Facility: MEDICAL CENTER | Age: 72
End: 2022-08-01

## 2022-08-01 NOTE — TELEPHONE ENCOUNTER
Patient called in after having a phone call from Dr Petros Perry yesterday 7/31/2022  Dr Petros Perry put in the notes       "1  Consultation with Dr Becca Muse as to future treatment and or imaging      2  We discussed the possibility of a concomitantly multiparametric MRI to see whether there was cuong enlargement by that modality and also discussed a follow-up repeat PSMA PET scan in the next couple of months along with serial PSA testing to see whether there is progression of radiographic findings or regression of these findings as would be expected if COVID-19 vaccine had any impact      The patient will contact the office for an appointment to discuss this with Dr Becca Muse when he returns to the office "      Patient would now like to see Dr Petros Perry  Please advise on when the patient should be seen and in what type of spot  Patient would also like to know if he should get an MRI done  Please advise on appropriate appt

## 2022-08-01 NOTE — TELEPHONE ENCOUNTER
----- Message from Amy Avery MD sent at 7/14/2022  4:29 PM EDT -----  Team, see my note today  PET scan is ordered today  I am on PT0 starting next Friday for two weeks and the scan will probably come back during that time  Patient and wife VERY upset about the possibility of persistent/recurrent cancer, after what was probably a routine case of early prostate cancer treated with radiation at [facility]  I will ask you all to be on the lookout for the PET scan results and have one of the docs discuss with patient and wife on the phone    Thanks

## 2022-08-02 ENCOUNTER — TELEPHONE (OUTPATIENT)
Dept: OTHER | Facility: OTHER | Age: 72
End: 2022-08-02

## 2022-08-02 DIAGNOSIS — C61 PROSTATE CANCER (HCC): Primary | ICD-10-CM

## 2022-08-02 NOTE — TELEPHONE ENCOUNTER
Called patient regarding call we received from [facility];  explained the LV MRI is not compatible to [facility] and need to have done at [facility] facility  They are very upset at 9/19 date they have for MRI at [facility]  They will call [facility]'s central scheduling to see if anything sooner at [place]  Wife requested I make doctor's aware of their situation and their displeasure of having to wait for MRI

## 2022-08-02 NOTE — TELEPHONE ENCOUNTER
Spoke to pt and advised that MRI order was placed in the chart  Number for central scheduling provided  Advised pt that Dr Ruth Isaac felt it would be better to be seen after results of MRI but that if pt wanted to be seen on 8/11/22 that would be fine

## 2022-08-02 NOTE — TELEPHONE ENCOUNTER
Spoke to pt and pt's wife s/p phone call by Dr Efrain Cogan about PET scan results  Pt stated he wants to follow up with Dr Efrain Cogan  He was given an appt for 8/11/22  Pt mentioned Dr Efrain Cogan discussed having MRI and want to have it scheduled  Pt is requesting to be seen sooner than this appt  There are no earlier appts available at this time    They will be placed on cancellation list

## 2022-08-02 NOTE — TELEPHONE ENCOUNTER
Geovani Paredes from [address] [place] [address]  called saying that she need more information on the patient MRI of his prostate and is asking if the office can fax over the script Fax Number [phone]

## 2022-08-10 ENCOUNTER — TELEPHONE (OUTPATIENT)
Dept: UROLOGY | Facility: MEDICAL CENTER | Age: 72
End: 2022-08-10

## 2022-08-10 NOTE — TELEPHONE ENCOUNTER
Patient dropped off a copy of his prostate biopsy report which was done at [facility] read by [facility] department of Pathology on September 2019  Summary: Three regions of the prostate were found to have cancer, all on left side  Two of these areas were [place] 3+4=7, one area was Cora six  Barbara Skipper

## 2022-08-11 ENCOUNTER — TELEPHONE (OUTPATIENT)
Dept: UROLOGY | Facility: MEDICAL CENTER | Age: 72
End: 2022-08-11

## 2022-08-11 ENCOUNTER — OFFICE VISIT (OUTPATIENT)
Dept: UROLOGY | Facility: MEDICAL CENTER | Age: 72
End: 2022-08-11
Payer: COMMERCIAL

## 2022-08-11 VITALS
WEIGHT: 192 LBS | HEIGHT: 71 IN | DIASTOLIC BLOOD PRESSURE: 88 MMHG | HEART RATE: 71 BPM | OXYGEN SATURATION: 98 % | BODY MASS INDEX: 26.88 KG/M2 | SYSTOLIC BLOOD PRESSURE: 138 MMHG

## 2022-08-11 DIAGNOSIS — C61 PROSTATE CANCER (HCC): Primary | ICD-10-CM

## 2022-08-11 DIAGNOSIS — C61 PROSTATE CANCER METASTATIC TO INTRAPELVIC LYMPH NODE (HCC): ICD-10-CM

## 2022-08-11 DIAGNOSIS — Z92.3 HISTORY OF RADIATION THERAPY: ICD-10-CM

## 2022-08-11 DIAGNOSIS — C77.5 PROSTATE CANCER METASTATIC TO INTRAPELVIC LYMPH NODE (HCC): ICD-10-CM

## 2022-08-11 PROCEDURE — 99215 OFFICE O/P EST HI 40 MIN: CPT | Performed by: UROLOGY

## 2022-08-11 PROCEDURE — 1160F RVW MEDS BY RX/DR IN RCRD: CPT | Performed by: UROLOGY

## 2022-08-11 NOTE — PROGRESS NOTES
100% counseling 1 hour  The patient and his wife and I discussed his metastatic prostate cancer  I reviewed CT PSMA PET scan which shows lymph node activity consistent with pelvic metastatic disease  We discussed options for treatment including early versus delayed androgen deprivation therapy  I did not recommend cryotherapy although was discussed  Additional radiation therapy to the pelvic lymph nodes will also be discussed with Radiation Oncology both at [address] [place] [address] and [facility] although it is my feeling that the treatment for metastatic pelvic disease with a doubling time of less than 10 months is androgen deprivation therapy  The patient is in agreement understanding risks of hot flashes osteoporosis and osteopenia  The patient will also consult with Hematology Oncology for further treatment with anti androgens  The patient is agreement will return in 3 months prior to which time PSA testosterone level comprehensive metabolic panel will take place

## 2022-08-11 NOTE — TELEPHONE ENCOUNTER
Eligard for office stock has been ordered for patient  Please claim for patient once received and ensure refrigerated once delivered

## 2022-08-11 NOTE — TELEPHONE ENCOUNTER
Pt here to see Dr Cross Current 8/11/22  Joan Ramirez [birth year]  # [id number]  Pt needs Eligard    Please order Yinka for this pt  we do not have office stock  we will call pt when it arrives in the  Office and call pt to come in  Pt understands this        Thank you,     Janie Meadows

## 2022-08-12 NOTE — TELEPHONE ENCOUNTER
Yinka received  Pt called and has appt for 8/17/22 in [facility] office with Nurse for injection

## 2022-08-15 ENCOUNTER — TELEPHONE (OUTPATIENT)
Dept: HEMATOLOGY ONCOLOGY | Facility: CLINIC | Age: 72
End: 2022-08-15

## 2022-08-15 NOTE — TELEPHONE ENCOUNTER
Made attempt to schedule a new patient appointment, patient stating he is waiting to hear back from another doctor and will call back to schedule a appointment if needed

## 2022-08-16 NOTE — TELEPHONE ENCOUNTER
Patient calling to reschedule Eligard injection that was scheduled on 8/17/22 in [facility] that he had to cancel because wanting to wait until after his oncology appointment  Patient requesting an appointment next Tuesday 8/23/22 around 2 pm if possible      He is requesting a call back at [phone]

## 2022-08-16 NOTE — TELEPHONE ENCOUNTER
Call placed to patient and spoke with him  Rescheduled Eligard injection to 8- at 2:30pm  Pt confirmed this appointment

## 2022-08-22 ENCOUNTER — TELEPHONE (OUTPATIENT)
Dept: UROLOGY | Facility: MEDICAL CENTER | Age: 72
End: 2022-08-22

## 2022-08-22 DIAGNOSIS — C61 PROSTATE CANCER (HCC): Primary | ICD-10-CM

## 2022-08-23 ENCOUNTER — PROCEDURE VISIT (OUTPATIENT)
Dept: UROLOGY | Facility: MEDICAL CENTER | Age: 72
End: 2022-08-23
Payer: COMMERCIAL

## 2022-08-23 VITALS
BODY MASS INDEX: 27.44 KG/M2 | SYSTOLIC BLOOD PRESSURE: 138 MMHG | HEIGHT: 71 IN | DIASTOLIC BLOOD PRESSURE: 90 MMHG | HEART RATE: 85 BPM | WEIGHT: 196 LBS

## 2022-08-23 DIAGNOSIS — C61 PROSTATE CANCER (HCC): Primary | ICD-10-CM

## 2022-08-23 PROCEDURE — 96402 CHEMO HORMON ANTINEOPL SQ/IM: CPT

## 2022-08-23 NOTE — PROGRESS NOTES
8/23/2022  Merlin Notice is a 67 y o  male  [id number]    Diagnosis:  Chief Complaint     Prostate Cancer          Patient presents for 6 month Eligard 45mg injection managed by Dr Dennis Leroy:  Follow up in 3 months with Claudine Saucedo scheduled for 11/15/22      Medication administration:    Left lower abdomen was cleaned with an alcohol wipe  Eligard 45mg was injected  Bandage applied to area  Patient tolerated well  Advised to call office with any issues       Administrations This Visit     leuprolide (ELIGARD 6 MONTH KIT) subcutaneous injection 45 mg     Admin Date  08/23/2022 Action  Given Dose  45 mg Route  Subcutaneous Administered By  Bella Chan                Vitals:    08/23/22 1413   BP: 138/90   BP Location: Left arm   Patient Position: Sitting   Cuff Size: Standard   Pulse: 85   Weight: 88 9 kg (196 lb)   Height: 5' 11" (1 803 m)         Bella Chan RN

## 2022-08-29 ENCOUNTER — TELEPHONE (OUTPATIENT)
Dept: HEMATOLOGY ONCOLOGY | Facility: CLINIC | Age: 72
End: 2022-08-29

## 2022-08-29 NOTE — TELEPHONE ENCOUNTER
Attempt to schedule new patient consult  Patient states he has already established care elsewhere and requests this referral be closed

## 2022-10-03 ENCOUNTER — TELEMEDICINE (OUTPATIENT)
Dept: FAMILY MEDICINE CLINIC | Facility: CLINIC | Age: 72
End: 2022-10-03
Payer: COMMERCIAL

## 2022-10-03 DIAGNOSIS — U07.1 COVID-19: Primary | ICD-10-CM

## 2022-10-03 PROCEDURE — 99214 OFFICE O/P EST MOD 30 MIN: CPT | Performed by: FAMILY MEDICINE

## 2022-10-03 RX ORDER — NIRMATRELVIR AND RITONAVIR 300-100 MG
3 KIT ORAL 2 TIMES DAILY
Qty: 30 TABLET | Refills: 0 | Status: SHIPPED | OUTPATIENT
Start: 2022-10-03 | End: 2022-10-08

## 2022-10-03 NOTE — ASSESSMENT & PLAN NOTE
Symptom onset: 10/1/22  Date of exposure: 9/29/22  Date of positive test: 10/1/22    Symptoms includes: chills, fatigue, malaise, nasal congestion, rhinorrhea, sore throat, cough and headache    Please maintain appropriate social distancing, wear a mask at all public spaces, wash hands frequently and clean surface after use  If you have fever greater than 104° that does not respond to Tylenol, difficulty eating or drinking, difficulty breathing please report to ER for evaluation    After discussion, will start pt on Paxlovid for treatment   Of note, patient has never taken [facility], state this medication was never prescribed

## 2022-10-03 NOTE — PROGRESS NOTES
COVID-19 Outpatient Progress Note    Assessment/Plan:    Problem List Items Addressed This Visit        Other    COVID-19 - Primary     Symptom onset: 10/1/22  Date of exposure: 9/29/22  Date of positive test: 10/1/22    Symptoms includes: chills, fatigue, malaise, nasal congestion, rhinorrhea, sore throat, cough and headache    Please maintain appropriate social distancing, wear a mask at all public spaces, wash hands frequently and clean surface after use  If you have fever greater than 104° that does not respond to Tylenol, difficulty eating or drinking, difficulty breathing please report to ER for evaluation    After discussion, will start pt on Paxlovid for treatment  Of note, patient has never taken Xtandi, state this medication was never prescribed         Relevant Medications    nirmatrelvir & ritonavir (Paxlovid, 300/100,) tablet therapy pack         Disposition:     I have spent 10 minutes directly with the patient  Encounter provider: Ana Rosa Rosenberg MD     Provider located at: [address] [place] [address] [id number] [phone]     Recent Visits  No visits were found meeting these conditions  Showing recent visits within past 7 days and meeting all other requirements  Today's Visits  Date Type Provider Dept   10/03/22 Telemedicine MD [facility]   Showing today's visits and meeting all other requirements  Future Appointments  No visits were found meeting these conditions  Showing future appointments within next 150 days and meeting all other requirements     This virtual check-in was done via [facility] and patient was informed that this is a secure, HIPAA-compliant platform  He agrees to proceed  Patient agrees to participate in a virtual check in via telephone or video visit instead of presenting to the office to address urgent/immediate medical needs  Patient is aware this is a billable service   He acknowledged consent and understanding of privacy and security of the video platform  The patient has agreed to participate and understands they can discontinue the visit at any time  After connecting through [facility], the patient was identified by name and date of birth  Bert Overton was informed that this was a telemedicine visit and that the exam was being conducted confidentially over secure lines  My office door was closed  No one else was in the room  Bert Overton acknowledged consent and understanding of privacy and security of the telemedicine visit  I informed the patient that I have reviewed his record in Epic and presented the opportunity for him to ask any questions regarding the visit today  The patient agreed to participate  Verification of patient location:  Patient is located in the following state in which I hold an active license: [place]    Subjective:   Bert Overton is a 67 y o  male who is concerned about COVID-19  Patient's symptoms include chills, fatigue, malaise, nasal congestion, rhinorrhea, sore throat, cough and headache  Patient denies fever, anosmia, loss of taste, shortness of breath, chest tightness, abdominal pain, nausea, vomiting, diarrhea and myalgias       - Date of symptom onset: 10/1/2022      COVID-19 vaccination status: Fully vaccinated (primary series)    Exposure:   Contact with a person who is under investigation (PUI) for or who is positive for COVID-19 within the last 14 days?: No    Hospitalized recently for fever and/or lower respiratory symptoms?: No      Currently a healthcare worker that is involved in direct patient care?: No      Works in a special setting where the risk of COVID-19 transmission may be high? (this may include long-term care, correctional and care home facilities; homeless shelters; assisted-living facilities and group homes ): No      Resident in a special setting where the risk of COVID-19 transmission may be high? (this may include long-term care, correctional and longterm facilities; homeless shelters; assisted-living facilities and group homes ): No      Thursday, contact with individual who ended up testing positive  Toribio chanel cancer appointment cancelled  Honey lemon lozenge and Mucinex helps with symptoms   Maintaining adequate oral intake and hydration  No results found for: Sivakuldeep Pedro, SARSCORONAVI, CORONAVIRUSR, SARSCOVAG, [facility]    Review of Systems   Constitutional: Positive for chills and fatigue  Negative for fever  HENT: Positive for congestion, rhinorrhea and sore throat  Respiratory: Positive for cough  Negative for chest tightness and shortness of breath  Gastrointestinal: Negative for abdominal pain, diarrhea, nausea and vomiting  Musculoskeletal: Negative for myalgias  Neurological: Positive for headaches  Current Outpatient Medications on File Prior to Visit   Medication Sig    acetaminophen (TYLENOL) 500 mg tablet Take 500 mg by mouth every 6 (six) hours as needed for mild pain    minoxidil (ROGAINE) 2 % external solution Apply topically    Misc Natural Products (GLUCOSAMINE CHONDROITIN ADV PO) Take 1 tablet by mouth daily    naproxen (NAPROSYN) 500 mg tablet     Omega-3 Fatty Acids (FISH OIL OMEGA-3) 1000 MG CAPS Take 2 g by mouth daily    [DISCONTINUED] enzalutamide (XTANDI) 40 mg capsule Take 160 mg by mouth daily (Patient not taking: Reported on 8/23/2022)       Objective: There were no vitals taken for this visit  Physical Exam  Pulmonary:      Effort: Pulmonary effort is normal    Neurological:      Mental Status: He is alert     Psychiatric:         Mood and Affect: Mood normal        Chandrika Winn MD

## 2022-10-03 NOTE — PATIENT INSTRUCTIONS
FACT SHEET FOR PATIENTS, PARENTS, AND CAREGIVERS   EMERGENCY USE AUTHORIZATION (EUA) OF PAXLOVID FOR CORONAVIRUS DISEASE 2019 (COVID-19)   You are being given this Fact Sheet because your healthcare provider believes it is necessary to provide you with PAXLOVID for the treatment of mild-to-moderate coronavirus disease (COVID-19) caused by the SARS-CoV-2 virus  This Fact Sheet contains information to help you understand the risks and benefits of taking the PAXLOVID you have received or may receive  This Fact Sheet also contains information about how to take PAXLOVID and how to report side effects or problems with the appearance or packaging of PAXLOVID  The [place]  Food and Drug Administration (FDA) has issued an Emergency Use Authorization (EUA) to make PAXLOVID available during the COVID-19 pandemic (for more details about an EUA please see "What is an Emergency Use Authorization?" at the end of this document)  Florinell Naegeli is not an FDA approved medicine in the [place]  Read this Fact Sheet for information about PAXLOVID  Talk to your healthcare provider about your options or if you have any questions  It is your choice to take PAXLOVID  What is COVID-19? COVID-19 is caused by a virus called a coronavirus  You can get COVID-19 through close contact with another person who has the virus  COVID-19 illnesses have ranged from very mild-to-severe, including illness resulting in death  While information so far suggests that most COVID-19 illness is mild, serious illness can happen and may cause some of your other medical conditions to become worse  Older people and people of all ages with severe, long lasting (chronic) medical conditions like heart disease, lung disease, and diabetes, for example seem to be at higher risk of being hospitalized for COVID-19  What is PAXLOVID?    Christell Naegeli is an investigational medicine used to treat mild-to-moderate COVID-19 in adults and children [15years of age and older weighing at least 88 pounds (40 kg)] with positive results of direct SARS-CoV-2 viral testing, and who are at high risk for progression to severe COVID-19, including hospitalization or death  PAXLOVID is investigational because it is still being studied  There is limited information about the safety and effectiveness of using PAXLOVID to treat people with mild-to-moderate COVID-19  The FDA has authorized the emergency use of PAXLOVID for the treatment of mild-to-moderate COVID-19 in adults and children [15years of age and older weighing at least 80 pounds (36 kg)] with a positive test for the virus that causes COVID-19, and who are at high risk for progression to severe COVID-19, including hospitalization or death, under an EUA  1   What should I tell my healthcare provider before I take PAXLOVID? Tell your healthcare provider if you:   ? Have any allergies ? Have liver or kidney disease ? Are pregnant or plan to become pregnant ? Are breastfeeding a child ? Have any serious illnesses   Some medicines may interact with PAXLOVID and may cause serious side effects  ? Tell your healthcare provider about all the medicines you take, including prescription and over-the-counter medicines, vitamins, and herbal supplements  ? Your healthcare provider can tell you if it is safe to take PAXLOVID with other medicines  ? You can ask your healthcare provider or pharmacist for a list of medicines that interact with PAXLOVID  ? Do not start taking a new medicine without telling your healthcare provider  Tell your healthcare provider if you are taking combined hormonal contraceptive  PAXLOVID may affect how your birth control pills work  Females who are able to become pregnant should use another effective alternative form of contraception or an additional barrier method of contraception  Talk to your healthcare provider if you have any questions about contraceptive methods that might be right for you     How do I take PAXLOVID? ? PAXLOVID consists of 2 medicines: nirmatrelvir tablets and ritonavir tablets  The 2 medicines are taken together 2 times each day for 5 days  Nirmatrelvir is an oval, pink tablet  Ritonavir is a white or off-white tablet  ? Leslie Rochaizzmarsha is available in 2 Dose Packs (see Figures A and B below)  Your healthcare provider will prescribe the PAXLOVID Dose Pack that is right for you    ? If you have kidney disease, your healthcare provider may prescribe a lower dose (see Figure B)  Talk to your healthcare provider to make sure you receive the correct Dose Pack  2   Figure A   If you have a PAXLOVID 300 mg; 100 mg Dose Pack: each dose contains 3 tablets  How to take PAXLOVID 300 mg; 100 mg Dose Pack   Morning Dose:   Take the 2 pink nirmatrelvir tablets and 1 white to off-white ritonavir tablet together at the same time each morning  Evening Dose:   Take the 2 pink nirmatrelvir tablets and 1 white to off-white ritonavir tablet together at the same time each evening  3   Figure B   If you have a PAXLOVID 150 mg; 100 mg Dose Pack: each dose contains 2 tablets  How to take PAXLOVID 150 mg; 100 mg Dose Pack   Morning Dose:   Take the 1 pink nirmatrelvir tablet and 1 white to off-white ritonavir tablet together at the same time each morning  Evening Dose:   Take the 1 pink nirmatrelvir tablet and 1 white to off-white ritonavir tablet together at the same time each evening  4   ? Do not remove your PAXLOVID tablets from the blister card before you are ready to take your dose  ? Take your first dose of PAXLOVID in the Morning or Evening, depending on when you  your prescription, or as recommended by your healthcare provider  ? Swallow the tablets whole  Do not chew, break, or crush the tablets  ? Take PAXLOVID with or without food  ? Do not stop taking PAXLOVID without talking to your healthcare provider, even if   you feel better     ? If you miss a dose of PAXLOVID within 8 hours of the time it is usually taken, take it as soon as you remember  If you miss a dose by more than 8 hours, skip the missed dose and take the next dose at your regular time  Do not take 2 doses of PAXLOVID at the same time  ? If you take too much PAXLOVID, call your healthcare provider or go to the nearest hospital emergency room right away  ? If you are taking a ritonavir-or cobicistat-containing medicine to treat hepatitis C or Human Immunodeficiency Virus (HIV), you should continue to take your medicine as prescribed by your healthcare provider  Talk to your healthcare provider if you do not feel better or if you feel worse after 5 days  Who should generally not take PAXLOVID? Do not take PAXLOVID if:   ? You are allergic to nirmatrelvir, ritonavir, or any of the ingredients in PAXLOVID  ? You are taking any of the following medicines:   alfuzosin o lomitapide o ranolazine   amiodarone o lovastatin o rifampin   apalutamide o lumacaftor/ivacaftor o [facility]  Marcelino's Wort   carbamazepine o lurasidone (hypericum perforatum)   colchicine o methylergonovine o sildenafil (Revatio®) for   dihydroergotamine o midazolam (oral) pulmonary arterial   dronedarone o naloxegol hypertension   eletriptan o phenobarbital o silodosin   eplerenone o phenytoin o simvastatin   ergotamine o pimozide o tolvaptan   finerenone o primidone o triazolam   flecainide o propafenone o ubrogepant   flibanserin o quinidine o voclosporin   ivabradine     Taking PAXLOVID with these medicines may cause serious or life-threatening side effects or affect how PAXLOVID works  These are not the only medicines that may cause serious side effects if taken with PAXLOVID  PAXLOVID may increase or decrease the levels of multiple other 5   medicines   It is very important to tell your healthcare provider about all of the medicines you are taking because additional laboratory tests or changes in the dose of your other medicines may be necessary while you are taking PAXLOVID  Your healthcare provider may also tell you about specific symptoms to watch out for that may indicate that you need to stop or decrease the dose of some of your other medicines  What are the important possible side effects of PAXLOVID? Possible side effects of PAXLOVID are:   ? Allergic Reactions  Allergic reactions, including severe allergic reactions (known as 'anaphylaxis'), can happen in people taking PAXLOVID, even after only 1 dose  Stop taking PAXLOVID and call your healthcare provider right away if you get any of the following symptoms of an allergic reaction:   hives   trouble swallowing or breathing   swelling of the mouth, lips, or face   throat tightness   hoarseness   skin rash     ? Liver Problems  Tell your healthcare provider right away if you have any of these signs and symptoms of liver problems: loss of appetite, yellowing of your skin and the whites of eyes (jaundice), dark-colored urine, pale colored stools and itchy skin, stomach area (abdominal) pain  ? Resistance to HIV Medicines  If you have untreated HIV infection, PAXLOVID may lead to some HIV medicines not working as well in the future  ? Other possible side effects include:   altered sense of taste   diarrhea   high blood pressure   muscle aches   abdominal pain   nausea   feeling generally unwell     These are not all the possible side effects of PAXLOVID  Not many people have taken PAXLOVID  Serious and unexpected side effects may happen  Denitaburton MobleyDonahue is still being studied, so it is possible that all of the risks are not known at this time  What other treatment choices are there? Veklury (remdesivir) is FDA-approved for the treatment of mild-to-moderate RYPRD-31 in certain adults and children  Talk with your healthcare provider to see if Anali Felder is appropriate for you  Like Kirti Liao may also allow for the emergency use of other medicines to treat people with COVID-19   Go to https://[employer]/ for 6   information on the emergency use of other medicines that are authorized by FDA to treat people with COVID-19  Your healthcare provider may talk with you about clinical trials for which you may be eligible  It is your choice to be treated or not to be treated with PAXLOVID  Should you decide not to receive it or for your child not to receive it, it will not change your standard medical care  What if I am pregnant or breastfeeding? There is no experience treating pregnant women or breastfeeding mothers with PAXLOVID  For a mother and unborn baby, the benefit of taking PAXLOVID may be greater than the risk from the treatment  If you are pregnant, discuss your options and specific situation with your healthcare provider  It is recommended that you use effective barrier contraception or do not have sexual activity while taking PAXLOVID  If you are breastfeeding, discuss your options and specific situation with your healthcare provider  How do I report side effects or problems with the appearance or packaging of PAXLOVID? Contact your healthcare provider if you have any side effects that bother you or do not go away  Report side effects or problems with the appearance or packaging of PAXLOVID (see Figures A and B above for examples of PAXLOVID Dose Packs) to FDA MedWatch at www fda gov/medwatch or call 8-786-XJN-3238 or you can report side effects to [employer]  at the contact information provided below  Website  Fax number  Telephone number    [employer]  [phone]

## 2022-11-01 ENCOUNTER — CLINICAL SUPPORT (OUTPATIENT)
Dept: FAMILY MEDICINE CLINIC | Facility: CLINIC | Age: 72
End: 2022-11-01

## 2022-11-01 DIAGNOSIS — Z01.818 PREOP EXAMINATION: Primary | ICD-10-CM

## 2022-11-18 ENCOUNTER — OFFICE VISIT (OUTPATIENT)
Dept: FAMILY MEDICINE CLINIC | Facility: CLINIC | Age: 72
End: 2022-11-18

## 2022-11-18 VITALS
HEIGHT: 71 IN | TEMPERATURE: 97.7 F | OXYGEN SATURATION: 97 % | SYSTOLIC BLOOD PRESSURE: 134 MMHG | BODY MASS INDEX: 28.03 KG/M2 | HEART RATE: 95 BPM | DIASTOLIC BLOOD PRESSURE: 86 MMHG | WEIGHT: 200.2 LBS

## 2022-11-18 DIAGNOSIS — M19.90 ARTHRITIS: ICD-10-CM

## 2022-11-18 DIAGNOSIS — Z91.89 RISK OF EXPOSURE TO LYME DISEASE: Primary | ICD-10-CM

## 2022-11-18 PROBLEM — U07.1 COVID-19: Status: RESOLVED | Noted: 2022-10-03 | Resolved: 2022-11-18

## 2022-11-18 RX ORDER — DOXYCYCLINE HYCLATE 100 MG
100 TABLET ORAL 2 TIMES DAILY
Qty: 20 TABLET | Refills: 0 | Status: SHIPPED | OUTPATIENT
Start: 2022-11-18 | End: 2022-11-28

## 2022-11-18 NOTE — PROGRESS NOTES
Assessment/Plan:     Diagnoses and all orders for this visit:    Risk of exposure to Lyme disease  Comments:  Start doxycycline p o  B i d  Times 10 days  Lyme titer in 30 days  Orders:  -     doxycycline hyclate (VIBRA-TABS) 100 mg tablet; Take 1 tablet (100 mg total) by mouth 2 (two) times a day for 10 days  -     Lyme Antibody Profile with reflex to WB; Future    Arthritis  Comments:  Patient wants to use topical Voltaren  Orders:  -     Diclofenac Sodium (VOLTAREN) 1 %; Apply 2 g topically 4 (four) times a day    Other orders  -     leuprolide (ELIGARD 6 MONTH KIT) 45 MG subcutaneous injection; Inject 45 mg under the skin every 6 (six) months          Subjective:      Patient ID: Jenae Lomeli is a 67 y o  male  Patient presents in the office with 2 tick bites  He has 1 on the left hip 1 on the right hip  Patient was out he are doing 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  Patient states he removed the ticks on Wednesday  Patient aware of signs and symptoms of Lyme disease  Prevention of Lyme disease  Will start doxycycline prophylactically  Patient will need to have a Lyme test in 30 days        The following portions of the patient's history were reviewed and updated as appropriate:   He   Patient Active Problem List    Diagnosis Date Noted   • History of prostate cancer 12/10/2021   • Mass of left wrist 09/01/2020   • Prostate cancer ([facility] [address] ) 11/19/2019   • Status post total left knee replacement 05/02/2018   • Hypothyroidism 05/03/2017   • Elevated prostate specific antigen (PSA) 05/31/2013     Current Outpatient Medications   Medication Sig Dispense Refill   • acetaminophen (TYLENOL) 500 mg tablet Take 500 mg by mouth every 6 (six) hours as needed for mild pain     • Diclofenac Sodium (VOLTAREN) 1 % Apply 2 g topically 4 (four) times a day 300 g 0   • doxycycline hyclate (VIBRA-TABS) 100 mg tablet Take 1 tablet (100 mg total) by mouth 2 (two) times a day for 10 days 20 tablet 0   • leuprolide (ELIGARD 6 MONTH KIT) 45 MG subcutaneous injection Inject 45 mg under the skin every 6 (six) months     • minoxidil (ROGAINE) 2 % external solution Apply topically     • Misc Natural Products (GLUCOSAMINE CHONDROITIN ADV PO) Take 1 tablet by mouth daily     • naproxen (NAPROSYN) 500 mg tablet      • Omega-3 Fatty Acids (FISH OIL OMEGA-3) 1000 MG CAPS Take 2 g by mouth daily       No current facility-administered medications for this visit  He has No Known Allergies       Review of Systems   Constitutional: Negative for fever  Cardiovascular: Negative for chest pain  Skin: Negative for rash  2   Redness  Sites  Where  I  Removed  Ticks  Objective:        Physical Exam  Vitals and nursing note reviewed  Constitutional:       Appearance: Normal appearance  HENT:      Head: Normocephalic and atraumatic  Right Ear: External ear normal       Left Ear: External ear normal    Pulmonary:      Effort: Pulmonary effort is normal    Skin:     Comments: Left  Hip  Red   Circular  Patch  No  Infection  Right back  Area  By   Right  Hip  Is a red  Circular  Lesion  No  infection   Neurological:      Mental Status: He is alert

## 2022-11-30 NOTE — PRE-PROCEDURE INSTRUCTIONS
Pre-Surgery Instructions:   Medication Instructions   • acetaminophen (TYLENOL) 500 mg tablet Uses PRN- OK to take day of surgery   • Diclofenac Sodium (VOLTAREN) 1 % Hold day of surgery  • leuprolide (ELIGARD 6 MONTH KIT) 45 MG subcutaneous injection Hold day of surgery  • minoxidil (ROGAINE) 2 % external solution Hold day of surgery  • Misc Natural Products (GLUCOSAMINE CHONDROITIN ADV PO) Stop taking 7 days prior to surgery  • Multiple Vitamins-Minerals (VITAMIN D3 COMPLETE PO) Stop taking 7 days prior to surgery  • naproxen (NAPROSYN) 500 mg tablet Stop taking 7 days prior to surgery  • Omega-3 Fatty Acids (FISH OIL OMEGA-3) 1000 MG CAPS Stop taking 7 days prior to surgery  Spoke with patient medication list reviewed  Npo after midnight  No alcohol 24hrs prior to surgery  Stop all vitamins and nsaids 1 week prior to surgery  patient understands shower instructions no shaving 24hrs prior to surgery or applying cream to face  Patient has been covid vaccinated and denies covid symptoms 1 vaccinated adult allowed with pt  Patient must have ride home after surgery, bring photo id and insurance card  No jewelry or contacts  [address] [place] [address] will call Friday with arrival time and directions

## 2022-12-04 ENCOUNTER — ANESTHESIA EVENT (OUTPATIENT)
Dept: PERIOP | Facility: HOSPITAL | Age: 72
End: 2022-12-04

## 2022-12-05 ENCOUNTER — HOSPITAL ENCOUNTER (OUTPATIENT)
Facility: HOSPITAL | Age: 72
Setting detail: OUTPATIENT SURGERY
Discharge: HOME | End: 2022-12-05
Attending: SURGERY | Admitting: SURGERY

## 2022-12-05 ENCOUNTER — ANESTHESIA (OUTPATIENT)
Dept: PERIOP | Facility: HOSPITAL | Age: 72
End: 2022-12-05

## 2022-12-05 VITALS
OXYGEN SATURATION: 96 % | SYSTOLIC BLOOD PRESSURE: 150 MMHG | HEIGHT: 69 IN | TEMPERATURE: 97 F | DIASTOLIC BLOOD PRESSURE: 85 MMHG | RESPIRATION RATE: 18 BRPM | HEART RATE: 69 BPM | WEIGHT: 195 LBS | BODY MASS INDEX: 28.88 KG/M2

## 2022-12-05 RX ORDER — DEXAMETHASONE SODIUM PHOSPHATE 10 MG/ML
INJECTION, SOLUTION INTRAMUSCULAR; INTRAVENOUS AS NEEDED
Status: DISCONTINUED | OUTPATIENT
Start: 2022-12-05 | End: 2022-12-05

## 2022-12-05 RX ORDER — CEFAZOLIN SODIUM 2 G/50ML
2000 SOLUTION INTRAVENOUS ONCE
Status: DISCONTINUED | OUTPATIENT
Start: 2022-12-05 | End: 2022-12-05 | Stop reason: HOSPADM

## 2022-12-05 RX ORDER — HYDROCODONE BITARTRATE AND ACETAMINOPHEN 5; 325 MG/1; MG/1
1 TABLET ORAL EVERY 4 HOURS PRN
Status: DISCONTINUED | OUTPATIENT
Start: 2022-12-05 | End: 2022-12-05 | Stop reason: HOSPADM

## 2022-12-05 RX ORDER — MAGNESIUM HYDROXIDE 1200 MG/15ML
LIQUID ORAL AS NEEDED
Status: DISCONTINUED | OUTPATIENT
Start: 2022-12-05 | End: 2022-12-05 | Stop reason: HOSPADM

## 2022-12-05 RX ORDER — ACETAMINOPHEN 325 MG/1
650 TABLET ORAL EVERY 6 HOURS PRN
Status: DISCONTINUED | OUTPATIENT
Start: 2022-12-05 | End: 2022-12-05 | Stop reason: HOSPADM

## 2022-12-05 RX ORDER — LIDOCAINE HYDROCHLORIDE 10 MG/ML
INJECTION, SOLUTION EPIDURAL; INFILTRATION; INTRACAUDAL; PERINEURAL AS NEEDED
Status: DISCONTINUED | OUTPATIENT
Start: 2022-12-05 | End: 2022-12-05

## 2022-12-05 RX ORDER — ONDANSETRON 2 MG/ML
4 INJECTION INTRAMUSCULAR; INTRAVENOUS ONCE AS NEEDED
Status: DISCONTINUED | OUTPATIENT
Start: 2022-12-05 | End: 2022-12-05 | Stop reason: HOSPADM

## 2022-12-05 RX ORDER — PROPOFOL 10 MG/ML
INJECTION, EMULSION INTRAVENOUS AS NEEDED
Status: DISCONTINUED | OUTPATIENT
Start: 2022-12-05 | End: 2022-12-05

## 2022-12-05 RX ORDER — SODIUM CHLORIDE, SODIUM LACTATE, POTASSIUM CHLORIDE, CALCIUM CHLORIDE 600; 310; 30; 20 MG/100ML; MG/100ML; MG/100ML; MG/100ML
75 INJECTION, SOLUTION INTRAVENOUS CONTINUOUS
Status: DISCONTINUED | OUTPATIENT
Start: 2022-12-05 | End: 2022-12-05 | Stop reason: HOSPADM

## 2022-12-05 RX ORDER — ROCURONIUM BROMIDE 10 MG/ML
INJECTION, SOLUTION INTRAVENOUS AS NEEDED
Status: DISCONTINUED | OUTPATIENT
Start: 2022-12-05 | End: 2022-12-05

## 2022-12-05 RX ORDER — SODIUM CHLORIDE, SODIUM LACTATE, POTASSIUM CHLORIDE, CALCIUM CHLORIDE 600; 310; 30; 20 MG/100ML; MG/100ML; MG/100ML; MG/100ML
20 INJECTION, SOLUTION INTRAVENOUS CONTINUOUS
Status: CANCELLED | OUTPATIENT
Start: 2022-12-05

## 2022-12-05 RX ORDER — SODIUM CHLORIDE, SODIUM LACTATE, POTASSIUM CHLORIDE, CALCIUM CHLORIDE 600; 310; 30; 20 MG/100ML; MG/100ML; MG/100ML; MG/100ML
INJECTION, SOLUTION INTRAVENOUS CONTINUOUS PRN
Status: DISCONTINUED | OUTPATIENT
Start: 2022-12-05 | End: 2022-12-05

## 2022-12-05 RX ORDER — CEFAZOLIN SODIUM 2 G/50ML
SOLUTION INTRAVENOUS AS NEEDED
Status: DISCONTINUED | OUTPATIENT
Start: 2022-12-05 | End: 2022-12-05

## 2022-12-05 RX ORDER — LIDOCAINE HYDROCHLORIDE AND EPINEPHRINE 10; 10 MG/ML; UG/ML
INJECTION, SOLUTION INFILTRATION; PERINEURAL AS NEEDED
Status: DISCONTINUED | OUTPATIENT
Start: 2022-12-05 | End: 2022-12-05 | Stop reason: HOSPADM

## 2022-12-05 RX ORDER — ONDANSETRON 2 MG/ML
INJECTION INTRAMUSCULAR; INTRAVENOUS AS NEEDED
Status: DISCONTINUED | OUTPATIENT
Start: 2022-12-05 | End: 2022-12-05

## 2022-12-05 RX ORDER — FENTANYL CITRATE 50 UG/ML
INJECTION, SOLUTION INTRAMUSCULAR; INTRAVENOUS AS NEEDED
Status: DISCONTINUED | OUTPATIENT
Start: 2022-12-05 | End: 2022-12-05

## 2022-12-05 RX ORDER — LABETALOL HYDROCHLORIDE 5 MG/ML
10 INJECTION, SOLUTION INTRAVENOUS
Status: DISCONTINUED | OUTPATIENT
Start: 2022-12-05 | End: 2022-12-05 | Stop reason: HOSPADM

## 2022-12-05 RX ADMIN — LIDOCAINE HYDROCHLORIDE 50 MG: 10 INJECTION, SOLUTION EPIDURAL; INFILTRATION; INTRACAUDAL; PERINEURAL at 09:17

## 2022-12-05 RX ADMIN — PROPOFOL 200 MG: 10 INJECTION, EMULSION INTRAVENOUS at 09:17

## 2022-12-05 RX ADMIN — DEXAMETHASONE SODIUM PHOSPHATE 10 MG: 10 INJECTION, SOLUTION INTRAMUSCULAR; INTRAVENOUS at 09:17

## 2022-12-05 RX ADMIN — ONDANSETRON 4 MG: 2 INJECTION INTRAMUSCULAR; INTRAVENOUS at 09:17

## 2022-12-05 RX ADMIN — FENTANYL CITRATE 50 MCG: 50 INJECTION, SOLUTION INTRAMUSCULAR; INTRAVENOUS at 09:17

## 2022-12-05 RX ADMIN — SODIUM CHLORIDE, SODIUM LACTATE, POTASSIUM CHLORIDE, AND CALCIUM CHLORIDE: .6; .31; .03; .02 INJECTION, SOLUTION INTRAVENOUS at 09:12

## 2022-12-05 RX ADMIN — ROCURONIUM BROMIDE 50 MG: 10 INJECTION, SOLUTION INTRAVENOUS at 09:17

## 2022-12-05 RX ADMIN — LABETALOL HYDROCHLORIDE 10 MG: 5 INJECTION, SOLUTION INTRAVENOUS at 10:33

## 2022-12-05 RX ADMIN — CEFAZOLIN SODIUM 2000 MG: 2 SOLUTION INTRAVENOUS at 09:12

## 2022-12-05 RX ADMIN — SUGAMMADEX 200 MG: 100 INJECTION, SOLUTION INTRAVENOUS at 10:00

## 2022-12-05 RX ADMIN — FENTANYL CITRATE 50 MCG: 50 INJECTION, SOLUTION INTRAMUSCULAR; INTRAVENOUS at 09:26

## 2022-12-05 NOTE — OP NOTE
OPERATIVE REPORT  PATIENT NAME: Merlin Notice    :  1950  MRN: [id number]  Pt Location:  OR ROOM 03    SURGERY DATE: 2022    Surgeon(s) and Role:     * Yehuda Verdugo MD - Primary    Preop Diagnosis:  Blepharochalasis right upper eyelid [H02 31]  Blepharochalasis left upper eyelid [H02 34]    Post-Op Diagnosis Codes: * Blepharochalasis right upper eyelid [H02 31]     * Blepharochalasis left upper eyelid [H02 34]    Procedure(s) (LRB):  BLEPHAROPLASTY UPPER (Bilateral)    Specimen(s):  * No specimens in log *    Estimated Blood Loss:   Minimal    Drains:  * No LDAs found *    Anesthesia Type:   General    Operative Indications:  Blepharochalasis right upper eyelid [H02 31]  Blepharochalasis left upper eyelid [H02 34]    Operative Findings:      Complications:   None    Procedure and Technique:  The patient was properly identified in the operating room and placed    in the supine position on the bed  She was prepped and draped in the    sterile surgical fashion  We first started by marking out the eyes with gentian violet and then    went ahead and incised the injected skin with 1% lidocaine with    epinephrine  At this point, we then went ahead and excised the skin    with a #15 blade and electrocautery  We obtained meticulous    hemostasis  We removed a strip of orbicularis oculi muscle along the    tarsal edge  We then went ahead and removed  fat from    the middle and medial fat pads of the upper eyes bilaterally  At this    point, we obtained meticulous hemostasis and closed the upper eyes    with deep 6-0 Vicryl and 6-0 Prolene suture  The    patient tolerated procedure well and was awakened from surgery,    dressed with antibiotic ointment and taken to the recovery room in    stable condition         I was present for the entire procedure    Patient Disposition:  PACU         SIGNATURE: Yehuda Verdugo MD  DATE: 2022  TIME: 10:11 AM

## 2022-12-05 NOTE — ANESTHESIA POSTPROCEDURE EVALUATION
Post-Op Assessment Note    CV Status:  Stable  Pain Score: 0    Pain management: adequate     Mental Status:  Alert and awake   Hydration Status:  Euvolemic   PONV Controlled:  Controlled   Airway Patency:  Patent  Airway: intubated      Post Op Vitals Reviewed: Yes      Staff: CRNA         No notable events documented      BP (!) 186/99 (12/05/22 1016)    Temp (!) 97 1 °F (36 2 °C) (12/05/22 1016)    Pulse 78 (12/05/22 1016)   Resp 16 (12/05/22 1016)    SpO2 96 % (12/05/22 1016)

## 2022-12-05 NOTE — DISCHARGE SUMMARY
PLASTIC, RECONSTRUCTIVE, & HAND SURGERY   Discharge Summary  Date of Admission:   12/5/2022  Date of Discharge:   12/05/22  Attending:  Dario Enciso MD  Principal/Final Diagnosis:   Blepharochalasis right upper eyelid [H02 31]  Blepharochalasis left upper eyelid [H02 34]  Principal Procedure:   BLEPHAROPLASTY UPPER (Bilateral: Eye)  Discharge Medications:  See after visit summary for reconciled discharge medications provided to patient and family  Reason for Admission:  Patsy Willis was electively admitted to undergo the above named procedure on 12/5/2022 as an outpatient  Hospital Course:  Patient underwent the above named procedure on the day of admission without complications  They were discharged home the same day  Disposition:  To home in care of self and family    Condition:  Good  Follow up:  Patient with follow up in the office with Dr Dario Enciso MD in 1 week(s) or as scheduled per his office  Dario Enciso MD  12/5/2022 10:15 AM

## 2022-12-05 NOTE — INTERVAL H&P NOTE
H&P reviewed  After examining the patient I find no changes in the patients condition since the H&P had been written      Vitals:    12/05/22 0706   BP: 140/83   Pulse: 70   Resp: 18   Temp: (!) 96 7 °F (35 9 °C)   SpO2: 97%

## 2022-12-05 NOTE — ANESTHESIA PREPROCEDURE EVALUATION
Procedure:  BLEPHAROPLASTY UPPER (Bilateral: Eye)    Relevant Problems   ENDO   (+) Hypothyroidism      /RENAL   (+) Prostate cancer ([facility] [address] )      Other   (+) Status post total left knee replacement        Physical Exam    Airway    Mallampati score: II  TM Distance: >3 FB  Neck ROM: full     Dental   No notable dental hx     Cardiovascular      Pulmonary      Other Findings        Anesthesia Plan  ASA Score- 2     Anesthesia Type- general with ASA Monitors  Additional Monitors:   Airway Plan: ETT  Plan Factors-Exercise tolerance (METS): >4 METS  Chart reviewed  Patient summary reviewed  Patient is not a current smoker  Patient did not smoke on day of surgery  Induction- intravenous  Postoperative Plan- Plan for postoperative opioid use  Planned trial extubation    Informed Consent- Anesthetic plan and risks discussed with patient  I personally reviewed this patient with the CRNA  Discussed and agreed on the Anesthesia Plan with the CRNA  Epi Hardy

## 2022-12-19 ENCOUNTER — APPOINTMENT (OUTPATIENT)
Dept: LAB | Facility: MEDICAL CENTER | Age: 72
End: 2022-12-19

## 2022-12-19 DIAGNOSIS — Z91.89 RISK OF EXPOSURE TO LYME DISEASE: ICD-10-CM

## 2022-12-20 LAB — B BURGDOR IGG+IGM SER-ACNC: 0.2 AI

## 2023-02-02 ENCOUNTER — TELEPHONE (OUTPATIENT)
Dept: UROLOGY | Facility: AMBULATORY SURGERY CENTER | Age: 73
End: 2023-02-02

## 2023-02-02 NOTE — TELEPHONE ENCOUNTER
Pt is a patient of Dr Aurelia Griggs and he had received a call regarding changing his appointment to 02/24/23 and he can not make that date  Please review and advice       Pt can be reached at [phone]

## 2023-02-03 ENCOUNTER — TELEPHONE (OUTPATIENT)
Dept: UROLOGY | Facility: MEDICAL CENTER | Age: 73
End: 2023-02-03

## 2023-02-08 NOTE — TELEPHONE ENCOUNTER
Spoke to pt to offer appt with Dr Abby Mendez for 6 mo fu and 6 mo Elijuliand  Pt stated he does not need an appt with Dr Abby Mendez at this time  He is being followed by his oncologist and will be getting his next Eligard there    Advised he contact this office for follow up as directed by his oncologist

## 2023-02-08 NOTE — TELEPHONE ENCOUNTER
Clinical spoke with pt on 2-8-23 (see encounter dated 2-3-23): pt does not need to r/s appt with dr Genaro Donnelly

## 2023-02-23 ENCOUNTER — LAB REQUISITION (OUTPATIENT)
Dept: LAB | Facility: HOSPITAL | Age: 73
End: 2023-02-23

## 2023-02-23 DIAGNOSIS — C61 MALIGNANT NEOPLASM OF PROSTATE (HCC): ICD-10-CM

## 2023-03-06 LAB — SCAN RESULT: NORMAL

## 2023-03-13 ENCOUNTER — OFFICE VISIT (OUTPATIENT)
Dept: FAMILY MEDICINE CLINIC | Facility: CLINIC | Age: 73
End: 2023-03-13

## 2023-03-13 VITALS
WEIGHT: 203.6 LBS | HEIGHT: 69 IN | SYSTOLIC BLOOD PRESSURE: 152 MMHG | HEART RATE: 70 BPM | BODY MASS INDEX: 30.16 KG/M2 | DIASTOLIC BLOOD PRESSURE: 77 MMHG | TEMPERATURE: 97.3 F | OXYGEN SATURATION: 95 %
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
## 2023-03-13 DIAGNOSIS — S30.861A TICK BITE OF ABDOMEN, INITIAL ENCOUNTER: ICD-10-CM

## 2023-03-13 DIAGNOSIS — R21 RASH: ICD-10-CM

## 2023-03-13 DIAGNOSIS — C61 PROSTATE CANCER (HCC): Primary | ICD-10-CM

## 2023-03-13 RX ORDER — DOXYCYCLINE HYCLATE 100 MG
100 TABLET ORAL 2 TIMES DAILY
Qty: 20 TABLET | Refills: 0 | Status: SHIPPED | OUTPATIENT
Start: 2023-03-13 | End: 2023-03-23

## 2023-03-13 NOTE — PATIENT INSTRUCTIONS
[facility] lab in few weeks, rx for doxy to hold onto in the meantime   If lyme test positive, consider treating for 21 days course

## 2023-03-13 NOTE — PROGRESS NOTES
Assessment/Plan:    1  Prostate cancer ([facility] [address] )    2  Tick bite of abdomen, initial encounter  -     Lyme Total Antibody Profile with reflex to WB; Future  -     doxycycline hyclate (VIBRA-TABS) 100 mg tablet; Take 1 tablet (100 mg total) by mouth 2 (two) times a day for 10 days    3  Rash  -     Lyme Total Antibody Profile with reflex to WB; Future        Patient Instructions   [facility] lab in few weeks, rx for doxy to hold onto in the meantime  If lyme test positive, consider treating for 21 days course      Return if symptoms worsen or fail to improve  Subjective:      Patient ID: Juan Pablo Sifuentes is a 67 y o  male  Chief Complaint   Patient presents with   • Tick Bite       Noticed tick bite on 3/10, likely got bit a week ago when he takes out the garbage  Undergoing treatment for prostate CA  No aches, no CP, SOB  Has gotten tick bnotes in the past, but has never been formally diagnosed with lyme  Dogs in the house    Tick Bite  Associated symptoms include a rash  Pertinent negatives include no abdominal pain, arthralgias, chest pain, congestion, fatigue, fever or headaches  The following portions of the patient's history were reviewed and updated as appropriate: allergies, current medications, past family history, past medical history, past social history, past surgical history and problem list     Review of Systems   Constitutional: Negative for fatigue and fever  HENT: Negative for congestion  Eyes: Negative for visual disturbance  Respiratory: Negative for chest tightness and shortness of breath  Cardiovascular: Negative for chest pain and palpitations  Gastrointestinal: Negative for abdominal pain  Genitourinary: Negative for difficulty urinating  Musculoskeletal: Negative for arthralgias  Skin: Positive for rash  Neurological: Negative for headaches  Hematological: Does not bruise/bleed easily           Current Outpatient Medications   Medication Sig Dispense Refill   • acetaminophen (TYLENOL) 500 mg tablet Take 500 mg by mouth every 6 (six) hours as needed for mild pain     • Cholecalciferol 25 MCG (1000 UT) capsule Take 1,000 Units by mouth daily     • Diclofenac Sodium (VOLTAREN) 1 % Apply 2 g topically 4 (four) times a day 300 g 0   • doxycycline hyclate (VIBRA-TABS) 100 mg tablet Take 1 tablet (100 mg total) by mouth 2 (two) times a day for 10 days 20 tablet 0   • leuprolide (ELIGARD 6 MONTH KIT) 45 MG subcutaneous injection Inject 45 mg under the skin every 6 (six) months     • minoxidil (ROGAINE) 2 % external solution Apply topically     • Misc Natural Products (GLUCOSAMINE CHONDROITIN ADV PO) Take 1 tablet by mouth daily     • Multiple Vitamins-Minerals (VITAMIN D3 COMPLETE PO) Take by mouth     • naproxen (NAPROSYN) 500 mg tablet as needed     • Omega-3 Fatty Acids (FISH OIL OMEGA-3) 1000 MG CAPS Take 2 g by mouth daily       No current facility-administered medications for this visit  Objective:    /77 (BP Location: Right arm, Patient Position: Sitting, Cuff Size: Standard)   Pulse 70   Temp (!) 97 3 °F (36 3 °C)   Ht 5' 9" (1 753 m)   Wt 92 4 kg (203 lb 9 6 oz)   SpO2 95%   BMI 30 07 kg/m²        Physical Exam  Vitals reviewed  Constitutional:       Appearance: Normal appearance  He is well-developed  Cardiovascular:      Rate and Rhythm: Normal rate and regular rhythm  Heart sounds: Normal heart sounds  Pulmonary:      Effort: Pulmonary effort is normal       Breath sounds: Normal breath sounds  Skin:     General: Skin is warm  Findings: Rash (local reaction to bite, R lower abdomen) present  Neurological:      Mental Status: He is alert and oriented to person, place, and time  Psychiatric:         Mood and Affect: Mood normal          Behavior: Behavior normal          Thought Content:  Thought content normal          Judgment: Judgment normal                 Venancio Noonan MD

## 2023-03-27 DIAGNOSIS — S30.861A TICK BITE OF ABDOMEN, INITIAL ENCOUNTER: Primary | ICD-10-CM
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
## 2023-03-27 RX ORDER — DOXYCYCLINE HYCLATE 100 MG
100 TABLET ORAL 2 TIMES DAILY
Qty: 28 TABLET | Refills: 0 | Status: SHIPPED | OUTPATIENT
Start: 2023-03-27 | End: 2023-04-10

## 2023-05-25 ENCOUNTER — APPOINTMENT (OUTPATIENT)
Dept: RADIOLOGY | Facility: CLINIC | Age: 73
End: 2023-05-25

## 2023-05-25 ENCOUNTER — OFFICE VISIT (OUTPATIENT)
Dept: URGENT CARE | Facility: CLINIC | Age: 73
End: 2023-05-25

## 2023-05-25 VITALS
RESPIRATION RATE: 18 BRPM | OXYGEN SATURATION: 96 % | DIASTOLIC BLOOD PRESSURE: 79 MMHG | TEMPERATURE: 98.8 F | SYSTOLIC BLOOD PRESSURE: 134 MMHG | HEART RATE: 103 BPM

## 2023-05-25 DIAGNOSIS — R11.0 NAUSEA: ICD-10-CM

## 2023-05-25 DIAGNOSIS — J20.9 ACUTE BRONCHITIS, UNSPECIFIED ORGANISM: ICD-10-CM

## 2023-05-25 DIAGNOSIS — R53.83 FATIGUE, UNSPECIFIED TYPE: ICD-10-CM

## 2023-05-25 DIAGNOSIS — J20.9 ACUTE BRONCHITIS, UNSPECIFIED ORGANISM: Primary | ICD-10-CM

## 2023-05-25 RX ORDER — ONDANSETRON 4 MG/1
4 TABLET, FILM COATED ORAL EVERY 6 HOURS PRN
Qty: 6 TABLET | Refills: 0 | Status: SHIPPED | OUTPATIENT
Start: 2023-05-25

## 2023-05-25 RX ORDER — BENZONATATE 200 MG/1
200 CAPSULE ORAL 3 TIMES DAILY PRN
Qty: 20 CAPSULE | Refills: 0 | Status: SHIPPED | OUTPATIENT
Start: 2023-05-25

## 2023-05-25 RX ORDER — VENLAFAXINE 37.5 MG/1
75 TABLET ORAL DAILY
COMMUNITY
Start: 2022-12-13 | End: 2023-12-08

## 2023-05-25 RX ORDER — ENZALUTAMIDE 40 MG/1
CAPSULE ORAL
COMMUNITY
Start: 2023-05-25

## 2023-05-25 RX ORDER — AZITHROMYCIN 250 MG/1
500 TABLET, FILM COATED ORAL ONCE
Status: COMPLETED | OUTPATIENT
Start: 2023-05-25 | End: 2023-05-25

## 2023-05-25 RX ORDER — AZITHROMYCIN 250 MG/1
TABLET, FILM COATED ORAL
Qty: 4 TABLET | Refills: 0 | Status: SHIPPED | OUTPATIENT
Start: 2023-05-26 | End: 2023-05-30

## 2023-05-25 RX ORDER — ONDANSETRON 4 MG/1
4 TABLET, ORALLY DISINTEGRATING ORAL ONCE
Status: COMPLETED | OUTPATIENT
Start: 2023-05-25 | End: 2023-05-25

## 2023-05-25 RX ORDER — DOXYCYCLINE HYCLATE 100 MG/1
CAPSULE ORAL
COMMUNITY
Start: 2023-03-18

## 2023-05-25 RX ADMIN — ONDANSETRON 4 MG: 4 TABLET, ORALLY DISINTEGRATING ORAL at 19:07

## 2023-05-25 RX ADMIN — AZITHROMYCIN 500 MG: 250 TABLET, FILM COATED ORAL at 19:06

## 2023-05-25 NOTE — PATIENT INSTRUCTIONS
1   Take meds as prescribed  2  Zofran half hour before meals or fluids  3  You had your dose of Zithromax today so take 1 pill a day for 4 more days  4  Encourage lots of liquids and rest  5  Follow-up with your PCP in 2 to 3 days  6  Check MyChart in 24 to 72 hours for COVID/results  7  Hot tea/honey  8  Warm salt water 3-4 times a day  9    If symptoms get worse proceed immediately to the emergency department

## 2023-05-25 NOTE — PROGRESS NOTES
"  [facility]        NAME: Andrea Linn is a 67 y o  male  : 1950    MRN: [id number]  DATE: May 25, 2023  TIME: 7:19 PM    Assessment and Plan   Acute bronchitis, unspecified organism [J20 [id number]]  1  Acute bronchitis, unspecified organism  XR chest pa & lateral    azithromycin (ZITHROMAX) tablet 500 mg    ondansetron (ZOFRAN-ODT) dispersible tablet 4 mg    azithromycin (ZITHROMAX) 250 mg tablet    benzonatate (TESSALON) 200 MG capsule      2  Nausea  ondansetron (ZOFRAN) 4 mg tablet      Chest x-ray: I do not appreciate any infiltrates or mass  Initially I thought there was some air under the right diaphragm however I reviewed x-rays from the past in December and it was read as linear scarring      Patient Instructions   Patient Instructions   1  Take meds as prescribed  2  Zofran half hour before meals or fluids  3  You had your dose of Zithromax today so take 1 pill a day for 4 more days  4  Encourage lots of liquids and rest  5  Follow-up with your PCP in 2 to 3 days  6  Check MyChart in 24 to 72 hours for COVID/results  7  Hot tea/honey  8  Warm salt water 3-4 times a day  9  If symptoms get worse proceed immediately to the emergency department        Follow up with PCP in 3-5 days  Proceed to  ER if symptoms worsen  Chief Complaint     Chief Complaint   Patient presents with   • Cough     C/o of increased fatigue and Runny nose for \"weeks\" and PNDConcerned it might be related to medication  States he is also \"queasy\" taking claritin, saline, and benadryl with some results  History of Present Illness       59-year-old male with a history of prostate CA presents with a chief complaint of cough, congestion, fatigue and postnasal drip, and nausea  Patient states symptoms started  afternoon in his yard  Patient is on a new medication for his prostate cancer and states his nose has been running since the new medication    Patient feels very tired but denies any fever or " chills  Patient states the cough is keeping him awake at night  Also notices irregular    Cough  Associated symptoms include postnasal drip, rhinorrhea and shortness of breath  Pertinent negatives include no chest pain, chills, fever, headaches, rash or wheezing  Review of Systems   Review of Systems   Constitutional: Positive for fatigue  Negative for chills and fever  HENT: Positive for postnasal drip and rhinorrhea  Negative for congestion  Eyes: Negative for discharge and visual disturbance  Respiratory: Positive for cough and shortness of breath  Negative for wheezing  Cardiovascular: Negative for chest pain and palpitations  Gastrointestinal: Positive for nausea  Negative for abdominal pain and vomiting  Endocrine: Negative for polydipsia and polyuria  Genitourinary: Negative for dysuria and hematuria  Musculoskeletal: Negative for arthralgias, gait problem and neck stiffness  Skin: Negative for rash and wound  Neurological: Negative for dizziness and headaches  Psychiatric/Behavioral: Negative for confusion and suicidal ideas           Current Medications       Current Outpatient Medications:   •  acetaminophen (TYLENOL) 500 mg tablet, Take 500 mg by mouth every 6 (six) hours as needed for mild pain, Disp: , Rfl:   •  [START ON 5/26/2023] azithromycin (ZITHROMAX) 250 mg tablet, 1 tablet daily x 4 days Do not start before May 26, 2023 , Disp: 4 tablet, Rfl: 0  •  benzonatate (TESSALON) 200 MG capsule, Take 1 capsule (200 mg total) by mouth 3 (three) times a day as needed for cough, Disp: 20 capsule, Rfl: 0  •  Cholecalciferol 25 MCG (1000 UT) capsule, Take 1,000 Units by mouth daily, Disp: , Rfl:   •  Diclofenac Sodium (VOLTAREN) 1 %, Apply 2 g topically 4 (four) times a day, Disp: 300 g, Rfl: 0  •  enzalutamide (XTANDI) 40 mg capsule, Take 160 mg by mouth daily, Disp: , Rfl:   •  leuprolide (ELIGARD 6 MONTH KIT) 45 MG subcutaneous injection, Inject 45 mg under the skin every 6 (six) months, Disp: , Rfl:   •  minoxidil (ROGAINE) 2 % external solution, Apply topically, Disp: , Rfl:   •  Misc Natural Products (GLUCOSAMINE CHONDROITIN ADV PO), Take 1 tablet by mouth daily, Disp: , Rfl:   •  naproxen (NAPROSYN) 500 mg tablet, as needed, Disp: , Rfl:   •  Omega-3 Fatty Acids (FISH OIL OMEGA-3) 1000 MG CAPS, Take 2 g by mouth daily, Disp: , Rfl:   •  ondansetron (ZOFRAN) 4 mg tablet, Take 1 tablet (4 mg total) by mouth every 6 (six) hours as needed for nausea or vomiting for up to 6 doses, Disp: 6 tablet, Rfl: 0  •  Xtandi 40 MG capsule, , Disp: , Rfl:   •  doxycycline hyclate (VIBRAMYCIN) 100 mg capsule, TAKE 1 CAPSULE BY MOUTH TWO TIMES A DAY FOR 10 DAYS (Patient not taking: Reported on 5/25/2023), Disp: , Rfl:   •  Multiple Vitamins-Minerals (VITAMIN D3 COMPLETE PO), Take by mouth (Patient not taking: Reported on 5/25/2023), Disp: , Rfl:   •  venlafaxine (EFFEXOR) 37 5 mg tablet, Take 75 mg by mouth daily (Patient not taking: Reported on 5/25/2023), Disp: , Rfl:   No current facility-administered medications for this visit      Current Allergies     Allergies as of 05/25/2023   • (No Known Allergies)            The following portions of the patient's history were reviewed and updated as appropriate: allergies, current medications, past family history, past medical history, past social history, past surgical history and problem list      Past Medical History:   Diagnosis Date   • Disease of thyroid gland     last assessed 10/13/2016   • History of radiation therapy     Impression 02/18/2016, of thymus as an infant   • Primary osteoarthritis of left knee     last assessed 04/13/2016   • Prostate cancer [facility])        Past Surgical History:   Procedure Laterality Date   • COLONOSCOPY     • COLONOSCOPY     • OR BLEPHAROPLASTY UPPER EYELID W/EXCESSIVE SKIN Bilateral 12/5/2022    Procedure: BLEPHAROPLASTY UPPER;  Surgeon: Rosa Elena Laws MD;  Location: [facility] MAIN OR;  Service: Plastics   • PROSTATE BIOPSY     • REPLACEMENT TOTAL KNEE Left    • TOTAL SHOULDER REPLACEMENT Right        Family History   Problem Relation Age of Onset   • Heart disease Father    • Heart failure Father    • Dementia Mother          Medications have been verified  Objective   /79 (BP Location: Right arm, Patient Position: Sitting, Cuff Size: Standard)   Pulse 103   Temp 98 8 °F (37 1 °C) (Temporal)   Resp 18   SpO2 96%        Physical Exam     Physical Exam  Vitals and nursing note reviewed  Constitutional:       General: He is not in acute distress  Appearance: Normal appearance  He is not ill-appearing, toxic-appearing or diaphoretic  Comments: 71-year-old male sitting next to his wife on the chair who appears ill and fatigued  Patient has a harsh cough  HENT:      Head: Normocephalic and atraumatic  Right Ear: Tympanic membrane normal       Left Ear: Tympanic membrane normal       Nose: Nose normal       Mouth/Throat:      Mouth: Mucous membranes are moist       Pharynx: Oropharynx is clear  No oropharyngeal exudate or posterior oropharyngeal erythema  Comments: Positive erythema of the posterior pharynx with some postnasal drip  Eyes:      Extraocular Movements: Extraocular movements intact  Pupils: Pupils are equal, round, and reactive to light  Neck:      Vascular: No carotid bruit  Cardiovascular:      Rate and Rhythm: Normal rate  Pulses: Normal pulses  Pulmonary:      Effort: Pulmonary effort is normal       Comments: There are Rales on the left lower lobe nursing home up the chest on auscultation  I do not appreciate any wheezing at this time right lung appears clear  Abdominal:      General: Abdomen is flat  Bowel sounds are normal  There is no distension  Palpations: Abdomen is soft  There is no mass  Tenderness: There is no abdominal tenderness  There is no right CVA tenderness, left CVA tenderness, guarding or rebound  Hernia: No hernia is present  Musculoskeletal:         General: No swelling, tenderness, deformity or signs of injury  Normal range of motion  Cervical back: Normal range of motion and neck supple  No rigidity  No muscular tenderness  Right lower leg: No edema  Left lower leg: No edema  Lymphadenopathy:      Cervical: No cervical adenopathy  Skin:     General: Skin is warm and dry  Coloration: Skin is not jaundiced or pale  Findings: No bruising, erythema, lesion or rash  Neurological:      General: No focal deficit present  Mental Status: He is alert and oriented to person, place, and time  Cranial Nerves: No cranial nerve deficit  Motor: No weakness     Psychiatric:         Mood and Affect: Mood normal

## 2023-05-26 LAB
FLUAV RNA RESP QL NAA+PROBE: NEGATIVE
FLUBV RNA RESP QL NAA+PROBE: NEGATIVE
SARS-COV-2 RNA RESP QL NAA+PROBE: NEGATIVE

## 2023-05-30 ENCOUNTER — RA CDI HCC (OUTPATIENT)
Dept: OTHER | Facility: HOSPITAL | Age: 73
End: 2023-05-30

## 2023-05-30 NOTE — PROGRESS NOTES
[facility]  coding opportunities       Chart reviewed, no opportunity found:   [address]        Patients Insurance     Medicare Insurance: [employer]

## 2023-05-31 ENCOUNTER — OFFICE VISIT (OUTPATIENT)
Dept: FAMILY MEDICINE CLINIC | Facility: CLINIC | Age: 73
End: 2023-05-31

## 2023-05-31 VITALS
RESPIRATION RATE: 16 BRPM | SYSTOLIC BLOOD PRESSURE: 114 MMHG | DIASTOLIC BLOOD PRESSURE: 76 MMHG | WEIGHT: 197 LBS | TEMPERATURE: 97.2 F | HEART RATE: 81 BPM | OXYGEN SATURATION: 98 % | BODY MASS INDEX: 29.18 KG/M2 | HEIGHT: 69 IN

## 2023-05-31 DIAGNOSIS — C61 PROSTATE CANCER METASTATIC TO INTRAPELVIC LYMPH NODE (HCC): ICD-10-CM

## 2023-05-31 DIAGNOSIS — J06.9 UPPER RESPIRATORY INFECTION WITH COUGH AND CONGESTION: ICD-10-CM

## 2023-05-31 DIAGNOSIS — C77.5 PROSTATE CANCER METASTATIC TO INTRAPELVIC LYMPH NODE (HCC): ICD-10-CM

## 2023-05-31 DIAGNOSIS — H61.22 IMPACTED CERUMEN OF LEFT EAR: Primary | ICD-10-CM

## 2023-05-31 RX ORDER — ALBUTEROL SULFATE 90 UG/1
2 AEROSOL, METERED RESPIRATORY (INHALATION) EVERY 6 HOURS PRN
Qty: 18 G | Refills: 1 | Status: SHIPPED | OUTPATIENT
Start: 2023-05-31

## 2023-05-31 RX ORDER — GUAIFENESIN 600 MG/1
1200 TABLET, EXTENDED RELEASE ORAL 2 TIMES DAILY
COMMUNITY

## 2023-05-31 RX ORDER — METHYLPREDNISOLONE 4 MG/1
TABLET ORAL
Qty: 21 EACH | Refills: 0 | Status: SHIPPED | OUTPATIENT
Start: 2023-05-31

## 2023-05-31 RX ORDER — LORATADINE 10 MG/1
10 TABLET ORAL DAILY
COMMUNITY

## 2023-05-31 NOTE — PROGRESS NOTES
BMI Counseling: Body mass index is 29 09 kg/m²  The BMI is above normal  Nutrition recommendations include decreasing portion sizes, decreasing fast food intake, consuming healthier snacks, limiting drinks that contain sugar and moderation in carbohydrate intake  Exercise recommendations include moderate physical activity 150 minutes/week, exercising 3-5 times per week and strength training exercises  No pharmacotherapy was ordered  Patient referred to PCP  Rationale for BMI follow-up plan is due to patient being overweight or obese  Depression Screening and Follow-up Plan: Patient was screened for depression during today's encounter  They screened negative with a PHQ-2 score of 0  Assessment/Plan:    1  Impacted cerumen of left ear    2  Upper respiratory infection with cough and congestion  -     albuterol (Ventolin HFA) 90 mcg/act inhaler; Inhale 2 puffs every 6 (six) hours as needed for wheezing  -     methylPREDNISolone 4 MG tablet therapy pack; Use as directed on package    3  Prostate cancer metastatic to intrapelvic lymph node [facility])        Patient Instructions   Pt instructed to use Debrox at home and follow-up in office for repeat cerumen removal    Check with oncologist regarding use of steroids  Return if symptoms worsen or fail to improve  Subjective:      Patient ID: Yenni Boyle is a 67 y o  male  Chief Complaint   Patient presents with   • Cough     Went to urgent care  Still coughing  Pt is a 68 y/o M presenting with cough, congestion, blocked ears, and fatigue that began about 10 days ago  Pt was seen in Urgent care on 5/25 and was given azithromycin  He completed this antibiotic course but does not think it made much of a difference in his symptoms  He continues to feel very fatigued and weak  Cough is worse at night and he does notice wheezing at nighttime as well  Becomes SOB with exertion, feels better when relaxing  His ears feel blocked today bilaterally   Pt notes that he has had a runny nose for the past few months, believed it was from testosterone injections  He powerwashed the house about 2 weeks ago and shortly after, began having symptoms  CXR was done in  on 5/25 which did not show any acute cardiopulmonary disease  He has been taking Mucinex DM BID and Claritin and benadryl daily as well  Wife is now sick at home  Cough  Associated symptoms include postnasal drip, shortness of breath (with exertion) and wheezing (at night)  Pertinent negatives include no chest pain, chills, fever, rhinorrhea or sore throat  The following portions of the patient's history were reviewed and updated as appropriate: allergies, current medications, past family history, past medical history, past social history, past surgical history and problem list     Review of Systems   Constitutional: Positive for fatigue  Negative for chills and fever  HENT: Positive for congestion, hearing loss (due to b/l effusion), postnasal drip and sinus pressure  Negative for rhinorrhea and sore throat  Respiratory: Positive for cough, shortness of breath (with exertion) and wheezing (at night)  Negative for chest tightness  Cardiovascular: Negative for chest pain and palpitations  Gastrointestinal: Negative for constipation, diarrhea, nausea and vomiting  Neurological: Positive for weakness  Negative for dizziness and light-headedness  Psychiatric/Behavioral: Positive for sleep disturbance           Current Outpatient Medications   Medication Sig Dispense Refill   • acetaminophen (TYLENOL) 500 mg tablet Take 500 mg by mouth every 6 (six) hours as needed for mild pain     • albuterol (Ventolin HFA) 90 mcg/act inhaler Inhale 2 puffs every 6 (six) hours as needed for wheezing 18 g 1   • benzonatate (TESSALON) 200 MG capsule Take 1 capsule (200 mg total) by mouth 3 (three) times a day as needed for cough 20 capsule 0   • Cholecalciferol 25 MCG (1000 UT) capsule Take 1,000 Units by "mouth daily     • Diclofenac Sodium (VOLTAREN) 1 % Apply 2 g topically 4 (four) times a day 300 g 0   • enzalutamide (XTANDI) 40 mg capsule Take 160 mg by mouth daily     • leuprolide (ELIGARD 6 MONTH KIT) 45 MG subcutaneous injection Inject 45 mg under the skin every 6 (six) months     • methylPREDNISolone 4 MG tablet therapy pack Use as directed on package 21 each 0   • minoxidil (ROGAINE) 2 % external solution Apply topically     • Misc Natural Products (GLUCOSAMINE CHONDROITIN ADV PO) Take 1 tablet by mouth daily     • naproxen (NAPROSYN) 500 mg tablet as needed     • Omega-3 Fatty Acids (FISH OIL OMEGA-3) 1000 MG CAPS Take 2 g by mouth daily     • ondansetron (ZOFRAN) 4 mg tablet Take 1 tablet (4 mg total) by mouth every 6 (six) hours as needed for nausea or vomiting for up to 6 doses 6 tablet 0   • SALINE NASAL SPRAY NA into each nostril     • doxycycline hyclate (VIBRAMYCIN) 100 mg capsule TAKE 1 CAPSULE BY MOUTH TWO TIMES A DAY FOR 10 DAYS (Patient not taking: Reported on 5/25/2023)     • guaiFENesin (MUCINEX) 600 mg 12 hr tablet Take 1,200 mg by mouth 2 (two) times a day     • loratadine (CLARITIN) 10 mg tablet Take 10 mg by mouth daily     • Multiple Vitamins-Minerals (VITAMIN D3 COMPLETE PO) Take by mouth (Patient not taking: Reported on 5/25/2023)     • venlafaxine (EFFEXOR) 37 5 mg tablet Take 75 mg by mouth daily (Patient not taking: Reported on 5/25/2023)     • Xtandi 40 MG capsule  (Patient not taking: Reported on 5/31/2023)       No current facility-administered medications for this visit  Objective:    /76 (BP Location: Left arm, Patient Position: Sitting, Cuff Size: Large)   Pulse 81   Temp (!) 97 2 °F (36 2 °C) (Temporal)   Resp 16   Ht 5' 9\" (1 753 m)   Wt 89 4 kg (197 lb)   SpO2 98%   BMI 29 09 kg/m²        Physical Exam  Vitals and nursing note reviewed  Constitutional:       General: He is not in acute distress  Appearance: Normal appearance     HENT:      Head: " Normocephalic and atraumatic  Right Ear: External ear normal       Left Ear: Ear canal and external ear normal  There is impacted cerumen  Ears:      Comments: R TM effusion      Nose: Congestion present  Mouth/Throat:      Mouth: Mucous membranes are moist       Pharynx: Oropharynx is clear  Cardiovascular:      Rate and Rhythm: Normal rate and regular rhythm  Pulses: Normal pulses  Heart sounds: Normal heart sounds  No murmur heard  Pulmonary:      Effort: Pulmonary effort is normal  No respiratory distress  Breath sounds: Normal breath sounds  No wheezing, rhonchi or rales  Musculoskeletal:      Cervical back: Neck supple  No tenderness  Lymphadenopathy:      Cervical: No cervical adenopathy  Skin:     General: Skin is warm and dry  Neurological:      General: No focal deficit present  Mental Status: He is alert and oriented to person, place, and time  Psychiatric:         Mood and Affect: Mood normal          Behavior: Behavior normal          Thought Content: Thought content normal          Judgment: Judgment normal             Ear cerumen removal    Date/Time: 5/31/2023 9:15 AM    Performed by: Kodak Feldman MD  Authorized by: Kodak Feldman MD  [place] Protocol:  Consent: Verbal consent obtained  Risks and benefits: risks, benefits and alternatives were discussed  Consent given by: patient  Patient understanding: patient states understanding of the procedure being performed      Patient location:  Clinic  Procedure details:     Local anesthetic:  None    Location:  L ear    Procedure type: irrigation only      Approach:  External  Post-procedure details:     Complication:  None    Hearing quality:  Improved    Patient tolerance of procedure:   Tolerated well, no immediate complications          Kodak Feldman MD

## 2023-05-31 NOTE — PATIENT INSTRUCTIONS
Pt instructed to use Debrox at home and follow-up in office for repeat cerumen removal    Check with oncologist regarding use of steroids

## 2023-06-07 ENCOUNTER — OFFICE VISIT (OUTPATIENT)
Dept: FAMILY MEDICINE CLINIC | Facility: CLINIC | Age: 73
End: 2023-06-07
Payer: COMMERCIAL

## 2023-06-07 VITALS
HEART RATE: 96 BPM | OXYGEN SATURATION: 97 % | DIASTOLIC BLOOD PRESSURE: 74 MMHG | WEIGHT: 196 LBS | HEIGHT: 69 IN | SYSTOLIC BLOOD PRESSURE: 130 MMHG | BODY MASS INDEX: 29.03 KG/M2 | TEMPERATURE: 97.6 F

## 2023-06-07 DIAGNOSIS — R23.2 HOT FLASH DUE TO MEDICATION: Primary | ICD-10-CM

## 2023-06-07 DIAGNOSIS — H61.22 IMPACTED CERUMEN OF LEFT EAR: ICD-10-CM

## 2023-06-07 DIAGNOSIS — T50.905A HOT FLASH DUE TO MEDICATION: Primary | ICD-10-CM

## 2023-06-07 PROCEDURE — 69209 REMOVE IMPACTED EAR WAX UNI: CPT | Performed by: FAMILY MEDICINE

## 2023-06-07 PROCEDURE — 99214 OFFICE O/P EST MOD 30 MIN: CPT | Performed by: FAMILY MEDICINE

## 2023-06-07 RX ORDER — VENLAFAXINE 25 MG/1
25 TABLET ORAL 2 TIMES DAILY
Qty: 30 TABLET | Refills: 1 | Status: SHIPPED | OUTPATIENT
Start: 2023-06-07 | End: 2023-06-15

## 2023-06-07 NOTE — PROGRESS NOTES
Assessment/Plan:    1  Hot flash due to medication  -     venlafaxine (EFFEXOR) 25 mg tablet; Take 1 tablet (25 mg total) by mouth 2 (two) times a day    2  Impacted cerumen of left ear  -     Ear cerumen removal        There are no Patient Instructions on file for this visit  No follow-ups on file  Subjective:      Patient ID: Ghassan Dunne is a 67 y o  male  Chief Complaint   Patient presents with   • ear clogged   • Cough       Pt here to remove wax from L ear, only used drops one time yesterday  Did not find debrox at the store  Asks about meds for hot flashes  Was afraid to try the med suggested by urology to help with s/e from prostate cancer treatment, but ready to try  effexor found in med history, will prescribe again, pt can try a half tab of a very low dose to start since he is hesitant, but hot flashes are interrupting quality of life  Cough        The following portions of the patient's history were reviewed and updated as appropriate: allergies, current medications, past family history, past medical history, past social history, past surgical history and problem list     Review of Systems   Respiratory: Positive for cough            Current Outpatient Medications   Medication Sig Dispense Refill   • acetaminophen (TYLENOL) 500 mg tablet Take 500 mg by mouth every 6 (six) hours as needed for mild pain     • albuterol (Ventolin HFA) 90 mcg/act inhaler Inhale 2 puffs every 6 (six) hours as needed for wheezing 18 g 1   • benzonatate (TESSALON) 200 MG capsule Take 1 capsule (200 mg total) by mouth 3 (three) times a day as needed for cough 20 capsule 0   • Cholecalciferol 25 MCG (1000 UT) capsule Take 1,000 Units by mouth daily     • Diclofenac Sodium (VOLTAREN) 1 % Apply 2 g topically 4 (four) times a day 300 g 0   • enzalutamide (XTANDI) 40 mg capsule Take 160 mg by mouth daily     • guaiFENesin (MUCINEX) 600 mg 12 hr tablet Take 1,200 mg by mouth 2 (two) times a day     • [facility]uprolide [facility]) 45 MG subcutaneous injection Inject 45 mg under the skin every 6 (six) months     • loratadine (CLARITIN) 10 mg tablet Take 10 mg by mouth daily     • methylPREDNISolone 4 MG tablet therapy pack Use as directed on package 21 each 0   • minoxidil (ROGAINE) 2 % external solution Apply topically     • Misc Natural Products (GLUCOSAMINE CHONDROITIN ADV PO) Take 1 tablet by mouth daily     • naproxen (NAPROSYN) 500 mg tablet as needed     • Omega-3 Fatty Acids (FISH OIL OMEGA-3) 1000 MG CAPS Take 2 g by mouth daily     • ondansetron (ZOFRAN) 4 mg tablet Take 1 tablet (4 mg total) by mouth every 6 (six) hours as needed for nausea or vomiting for up to 6 doses 6 tablet 0   • SALINE NASAL SPRAY NA into each nostril     • venlafaxine (EFFEXOR) 25 mg tablet Take 1 tablet (25 mg total) by mouth 2 (two) times a day 30 tablet 1   • doxycycline hyclate (VIBRAMYCIN) 100 mg capsule TAKE 1 CAPSULE BY MOUTH TWO TIMES A DAY FOR 10 DAYS (Patient not taking: Reported on 5/25/2023)     • Multiple Vitamins-Minerals (VITAMIN D3 COMPLETE PO) Take by mouth (Patient not taking: Reported on 5/25/2023)     • Xtandi 40 MG capsule  (Patient not taking: Reported on 5/31/2023)       No current facility-administered medications for this visit  Objective:    /74 (BP Location: Right arm, Patient Position: Sitting, Cuff Size: Standard)   Pulse 96   Temp 97 6 °F (36 4 °C)   Ht 5' 9\" (1 753 m)   Wt 88 9 kg (196 lb)   SpO2 97%   BMI 28 94 kg/m²        Physical Exam       Ear cerumen removal    Date/Time: 6/7/2023 10:30 AM    Performed by: Debi Velarde MD  Authorized by: Debi Velarde MD  Universal Protocol:  Consent: Verbal consent obtained    Consent given by: patient  Patient understanding: patient states understanding of the procedure being performed  Patient identity confirmed: verbally with patient      Patient location:  Clinic  Procedure details:     Local anesthetic:  None    Location:  L ear    " Procedure type: irrigation only      Approach:  External  Post-procedure details:     Complication:  None    Hearing quality:  Improved    Patient tolerance of procedure: Tolerated well, no immediate complications  Comments:      Pt only pretreated with OTC agent one time the day prior to this attempt  Partially successful cerumen removal with application of debrox drops in office and irrigation 25 minutes later  Pt was instructed to continue the use of drops for a few more days until he came back into office  Cerumen was too deep to retrieve with instruments            Esteban Humphrey MD

## 2023-06-14 ENCOUNTER — OFFICE VISIT (OUTPATIENT)
Dept: FAMILY MEDICINE CLINIC | Facility: CLINIC | Age: 73
End: 2023-06-14
Payer: COMMERCIAL

## 2023-06-14 VITALS
BODY MASS INDEX: 28.68 KG/M2 | DIASTOLIC BLOOD PRESSURE: 76 MMHG | SYSTOLIC BLOOD PRESSURE: 132 MMHG | HEIGHT: 69 IN | HEART RATE: 86 BPM | TEMPERATURE: 97.4 F | WEIGHT: 193.6 LBS | OXYGEN SATURATION: 96 %

## 2023-06-14 DIAGNOSIS — R23.2 HOT FLASH DUE TO MEDICATION: ICD-10-CM

## 2023-06-14 DIAGNOSIS — T50.905A HOT FLASH DUE TO MEDICATION: ICD-10-CM

## 2023-06-14 DIAGNOSIS — H61.22 IMPACTED CERUMEN OF LEFT EAR: ICD-10-CM

## 2023-06-14 DIAGNOSIS — R23.2 HOT FLASH DUE TO MEDICATION: Primary | ICD-10-CM

## 2023-06-14 DIAGNOSIS — T50.905A HOT FLASH DUE TO MEDICATION: Primary | ICD-10-CM

## 2023-06-14 PROCEDURE — 69210 REMOVE IMPACTED EAR WAX UNI: CPT | Performed by: FAMILY MEDICINE

## 2023-06-14 PROCEDURE — 99214 OFFICE O/P EST MOD 30 MIN: CPT | Performed by: FAMILY MEDICINE

## 2023-06-14 NOTE — PROGRESS NOTES
Assessment/Plan:    1  Hot flash due to medication    2  Impacted cerumen of left ear        Patient Instructions   Can take one more dose venlafaxine tonight and then stop, f/u with ENT next month as planned for additional wax removal as needed      No follow-ups on file  Subjective:      Patient ID: Sachin Cochran is a 67 y o  male  Chief Complaint   Patient presents with   • Follow-up     Wants to go off of the venlafaxine, patient stated makes him feel light headed and has no appetite       Here for another attempt to remove was from the L ear  Pt has been using the drops since last visit  In the meantime he has also made appt with ENT but not until next month  Wax affecting his hearing  He notes some dizziness with the low dose effexor for the hot flashes  Considering homeopathic treatment for the hot flashes, maybe better tolerated  pateint is established with a homeopath near [employer]  The following portions of the patient's history were reviewed and updated as appropriate: allergies, current medications, past family history, past medical history, past social history, past surgical history and problem list     Review of Systems   Constitutional: Negative for fatigue and fever  HENT: Positive for hearing loss  Negative for congestion  Eyes: Negative for visual disturbance  Respiratory: Negative for chest tightness and shortness of breath  Cardiovascular: Negative for chest pain and palpitations  Gastrointestinal: Negative for abdominal pain  Genitourinary: Negative for difficulty urinating  Musculoskeletal: Negative for arthralgias  Neurological: Positive for dizziness  Negative for headaches  Hematological: Does not bruise/bleed easily     Psychiatric/Behavioral:        Brain fog, dizzy since effexor         Current Outpatient Medications   Medication Sig Dispense Refill   • acetaminophen (TYLENOL) 500 mg tablet Take 500 mg by mouth every 6 (six) hours as needed for mild "pain     • albuterol (Ventolin HFA) 90 mcg/act inhaler Inhale 2 puffs every 6 (six) hours as needed for wheezing 18 g 1   • benzonatate (TESSALON) 200 MG capsule Take 1 capsule (200 mg total) by mouth 3 (three) times a day as needed for cough 20 capsule 0   • Cholecalciferol 25 MCG (1000 UT) capsule Take 1,000 Units by mouth daily     • Diclofenac Sodium (VOLTAREN) 1 % Apply 2 g topically 4 (four) times a day 300 g 0   • doxycycline hyclate (VIBRAMYCIN) 100 mg capsule      • enzalutamide (XTANDI) 40 mg capsule Take 160 mg by mouth daily     • guaiFENesin (MUCINEX) 600 mg 12 hr tablet Take 1,200 mg by mouth 2 (two) times a day     • leuprolide (ELIGARD 6 MONTH KIT) 45 MG subcutaneous injection Inject 45 mg under the skin every 6 (six) months     • loratadine (CLARITIN) 10 mg tablet Take 10 mg by mouth daily     • methylPREDNISolone 4 MG tablet therapy pack Use as directed on package 21 each 0   • minoxidil (ROGAINE) 2 % external solution Apply topically     • Misc Natural Products (GLUCOSAMINE CHONDROITIN ADV PO) Take 1 tablet by mouth daily     • Multiple Vitamins-Minerals (VITAMIN D3 COMPLETE PO) Take by mouth     • naproxen (NAPROSYN) 500 mg tablet as needed     • Omega-3 Fatty Acids (FISH OIL OMEGA-3) 1000 MG CAPS Take 2 g by mouth daily     • ondansetron (ZOFRAN) 4 mg tablet Take 1 tablet (4 mg total) by mouth every 6 (six) hours as needed for nausea or vomiting for up to 6 doses 6 tablet 0   • SALINE NASAL SPRAY NA into each nostril     • venlafaxine (EFFEXOR) 25 mg tablet Take 1 tablet (25 mg total) by mouth 2 (two) times a day 30 tablet 1   • Xtandi 40 MG capsule        No current facility-administered medications for this visit  Objective:    /76 (BP Location: Right arm, Patient Position: Sitting, Cuff Size: Large)   Pulse 86   Temp (!) 97 4 °F (36 3 °C) (Temporal)   Ht 5' 9\" (1 753 m)   Wt 87 8 kg (193 lb 9 6 oz)   SpO2 96%   BMI 28 59 kg/m²        Physical Exam  Vitals reviewed   " Constitutional:       Appearance: Normal appearance  He is well-developed  Cardiovascular:      Rate and Rhythm: Normal rate  Pulmonary:      Effort: Pulmonary effort is normal    Skin:     General: Skin is warm  Neurological:      Mental Status: He is alert and oriented to person, place, and time  Psychiatric:         Mood and Affect: Mood normal          Behavior: Behavior normal          Thought Content: Thought content normal          Judgment: Judgment normal           Ear cerumen removal    Date/Time: 6/14/2023 10:30 AM    Performed by: Vanesa Dunn MD  Authorized by: Vanesa Dunn MD  Universal Protocol:  Consent: Verbal consent obtained  Consent given by: patient  Patient understanding: patient states understanding of the procedure being performed      Patient location:  Clinic  Procedure details:     Local anesthetic:  None    Location:  R ear    Procedure type: irrigation with instrumentation      Approach:  Natural orifice  Post-procedure details:     Complication:  None    Hearing quality:  Improved    Patient tolerance of procedure:   Tolerated well, no immediate complications  Comments:      Cerumen removed in its entirety after more debrox applied in office and pt sat for 20 mintues             Vanesa Dunn MD

## 2023-06-14 NOTE — PATIENT INSTRUCTIONS
Can take one more dose venlafaxine tonight and then stop, f/u with ENT next month as planned for additional wax removal as needed

## 2023-06-15 RX ORDER — VENLAFAXINE 25 MG/1
TABLET ORAL
Qty: 180 TABLET | Refills: 1 | Status: SHIPPED | OUTPATIENT
Start: 2023-06-15

## 2023-12-18 ENCOUNTER — TELEPHONE (OUTPATIENT)
Age: 73
End: 2023-12-18

## 2023-12-18 ENCOUNTER — OFFICE VISIT (OUTPATIENT)
Dept: FAMILY MEDICINE CLINIC | Facility: CLINIC | Age: 73
End: 2023-12-18
Payer: COMMERCIAL

## 2023-12-18 VITALS
DIASTOLIC BLOOD PRESSURE: 72 MMHG | TEMPERATURE: 97.3 F | BODY MASS INDEX: 30.72 KG/M2 | SYSTOLIC BLOOD PRESSURE: 122 MMHG | WEIGHT: 207.4 LBS | OXYGEN SATURATION: 98 % | HEART RATE: 97 BPM | HEIGHT: 69 IN

## 2023-12-18 DIAGNOSIS — R59.0 ANTERIOR CERVICAL ADENOPATHY: ICD-10-CM

## 2023-12-18 DIAGNOSIS — C77.5 PROSTATE CANCER METASTATIC TO INTRAPELVIC LYMPH NODE (HCC): Primary | ICD-10-CM

## 2023-12-18 DIAGNOSIS — C61 PROSTATE CANCER METASTATIC TO INTRAPELVIC LYMPH NODE (HCC): Primary | ICD-10-CM

## 2023-12-18 PROCEDURE — 99214 OFFICE O/P EST MOD 30 MIN: CPT | Performed by: FAMILY MEDICINE

## 2023-12-18 RX ORDER — AMOXICILLIN 500 MG/1
500 CAPSULE ORAL EVERY 8 HOURS SCHEDULED
Qty: 30 CAPSULE | Refills: 0 | Status: SHIPPED | OUTPATIENT
Start: 2023-12-18 | End: 2023-12-28

## 2023-12-18 NOTE — PROGRESS NOTES
Assessment/Plan:    1. 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 Anterior cervical adenopathy  -     amoxicillin (AMOXIL) 500 mg capsule; Take 1 capsule (500 mg total) by mouth every 8 (eight) hours for 10 days          Patient Instructions   Watch for ENT symptoms to develop over the next 2 weeks, amoxil rx given to hold onto. Can recheck the node in 2 weeks at f/u appt. Consider neck US at that time.     Return if symptoms worsen or fail to improve.    Subjective:      Patient ID: Ronny Fisher is a 73 y.o. male.    Chief Complaint   Patient presents with    Edema     Left neck    Headache    Nasal Congestion    Difficulty Swallowing       Here for concern of feeling a bump in his neck since 12/15. Feels a lump when lying down and he swallows. Occasional headaches that are localized to the back of his ears along with stiff neck for the past month. Takes tylenol every day for back pain. Currently being treated for prostate cancer. Gets acid reflux from the medication to treat his cancer - stopped taking this so his acid reflux has stopped.     Headache      The following portions of the patient's history were reviewed and updated as appropriate:  past social history    Review of Systems   Constitutional:  Negative for fatigue and fever.   HENT:  Positive for rhinorrhea (for the past couple months accompanying headaches), sore throat and trouble swallowing. Negative for congestion, sinus pressure and sinus pain.    Eyes:  Negative for visual disturbance.   Respiratory:  Negative for chest tightness and shortness of breath.    Cardiovascular:  Negative for chest pain and palpitations.   Gastrointestinal:  Negative for abdominal pain.   Genitourinary:  Negative for difficulty urinating.   Musculoskeletal:  Negative for arthralgias.   Skin:  Negative for color change and rash.   Neurological:  Positive for headaches (stiff neck and headaches behind the ear). Negative for tremors, syncope,  weakness and light-headedness.   Hematological:  Does not bruise/bleed easily.         Current Outpatient Medications   Medication Sig Dispense Refill    acetaminophen (TYLENOL) 500 mg tablet Take 500 mg by mouth every 6 (six) hours as needed for mild pain      albuterol (Ventolin HFA) 90 mcg/act inhaler Inhale 2 puffs every 6 (six) hours as needed for wheezing 18 g 1    benzonatate (TESSALON) 200 MG capsule Take 1 capsule (200 mg total) by mouth 3 (three) times a day as needed for cough 20 capsule 0    Cholecalciferol 25 MCG (1000 UT) capsule Take 1,000 Units by mouth daily      Diclofenac Sodium (VOLTAREN) 1 % Apply 2 g topically 4 (four) times a day 300 g 0    guaiFENesin (MUCINEX) 600 mg 12 hr tablet Take 1,200 mg by mouth 2 (two) times a day As needed      leuprolide (ELIGARD 6 MONTH KIT) 45 MG subcutaneous injection Inject 45 mg under the skin every 3 (three) months      loratadine (CLARITIN) 10 mg tablet Take 10 mg by mouth daily      Misc Natural Products (GLUCOSAMINE CHONDROITIN ADV PO) Take 1 tablet by mouth daily      Omega-3 Fatty Acids (FISH OIL OMEGA-3) 1000 MG CAPS Take 2 g by mouth daily      SALINE NASAL SPRAY NA into each nostril      amoxicillin (AMOXIL) 500 mg capsule Take 1 capsule (500 mg total) by mouth every 8 (eight) hours for 10 days 30 capsule 0    doxycycline hyclate (VIBRAMYCIN) 100 mg capsule  (Patient not taking: Reported on 12/18/2023)      enzalutamide (XTANDI) 40 mg capsule Take 160 mg by mouth daily (Patient not taking: Reported on 12/18/2023)      methylPREDNISolone 4 MG tablet therapy pack Use as directed on package (Patient not taking: Reported on 12/18/2023) 21 each 0    minoxidil (ROGAINE) 2 % external solution Apply topically (Patient not taking: Reported on 12/18/2023)      Multiple Vitamins-Minerals (VITAMIN D3 COMPLETE PO) Take by mouth (Patient not taking: Reported on 12/18/2023)      naproxen (NAPROSYN) 500 mg tablet as needed (Patient not taking: Reported on 12/18/2023)  "     ondansetron (ZOFRAN) 4 mg tablet Take 1 tablet (4 mg total) by mouth every 6 (six) hours as needed for nausea or vomiting for up to 6 doses (Patient not taking: Reported on 12/18/2023) 6 tablet 0    venlafaxine (EFFEXOR) 25 mg tablet TAKE 1 TABLET BY MOUTH TWICE A DAY (Patient not taking: Reported on 12/18/2023) 180 tablet 1    Xtandi 40 MG capsule  (Patient not taking: Reported on 12/18/2023)       No current facility-administered medications for this visit.       Objective:    /72 (BP Location: Left arm, Patient Position: Sitting, Cuff Size: Large)   Pulse 97   Temp (!) 97.3 °F (36.3 °C) (Temporal)   Ht 5' 9\" (1.753 m)   Wt 94.1 kg (207 lb 6.4 oz)   SpO2 98%   BMI 30.63 kg/m²      Physical Exam  Vitals reviewed.   Constitutional:       Appearance: Normal appearance. He is well-developed. He is not ill-appearing or toxic-appearing.   HENT:      Right Ear: External ear normal.      Left Ear: External ear normal.      Ears:      Comments: Slight bulging and fluid noted in both ears     Mouth/Throat:      Pharynx: No oropharyngeal exudate or posterior oropharyngeal erythema.   Cardiovascular:      Rate and Rhythm: Normal rate and regular rhythm.      Heart sounds: Murmur heard.   Pulmonary:      Effort: Pulmonary effort is normal.      Breath sounds: Normal breath sounds.   Musculoskeletal:      Cervical back: Rigidity present.      Right lower leg: No edema.      Left lower leg: No edema.   Lymphadenopathy:      Cervical: Cervical adenopathy (anterior cervical lymphadenopathy) present.   Skin:     General: Skin is warm.   Neurological:      General: No focal deficit present.      Mental Status: He is alert and oriented to person, place, and time.   Psychiatric:         Mood and Affect: Mood normal.         Behavior: Behavior normal.         Thought Content: Thought content normal.         Judgment: Judgment normal.             Corry Augustin MD  "

## 2023-12-18 NOTE — TELEPHONE ENCOUNTER
Patient called in stating he's been having headaches, runny nose and swollen glands under his jaw on his neck. Patient has an appt scheduled with Dr. Magallanes but would like to know if any cancellations occur with Dr. Augustin today if he could come in today to see her. Please advise.

## 2023-12-18 NOTE — PATIENT INSTRUCTIONS
Watch for ENT symptoms to develop over the next 2 weeks, amoxil rx given to hold onto. Can recheck the node in 2 weeks at f/u appt. Consider neck US at that time.

## 2024-01-04 ENCOUNTER — OFFICE VISIT (OUTPATIENT)
Dept: FAMILY MEDICINE CLINIC | Facility: CLINIC | Age: 74
End: 2024-01-04
Payer: COMMERCIAL

## 2024-01-04 VITALS
TEMPERATURE: 97.9 F | OXYGEN SATURATION: 95 % | WEIGHT: 207 LBS | HEIGHT: 69 IN | BODY MASS INDEX: 30.66 KG/M2 | SYSTOLIC BLOOD PRESSURE: 122 MMHG | HEART RATE: 83 BPM | DIASTOLIC BLOOD PRESSURE: 80 MMHG

## 2024-01-04 DIAGNOSIS — C61 PROSTATE CANCER METASTATIC TO INTRAPELVIC LYMPH NODE (HCC): ICD-10-CM

## 2024-01-04 DIAGNOSIS — R73.9 ELEVATED BLOOD SUGAR LEVEL: ICD-10-CM

## 2024-01-04 DIAGNOSIS — E78.5 HYPERLIPIDEMIA, UNSPECIFIED HYPERLIPIDEMIA TYPE: ICD-10-CM

## 2024-01-04 DIAGNOSIS — M19.90 ARTHRITIS: ICD-10-CM

## 2024-01-04 DIAGNOSIS — C77.5 PROSTATE CANCER METASTATIC TO INTRAPELVIC LYMPH NODE (HCC): ICD-10-CM

## 2024-01-04 DIAGNOSIS — E03.9 ACQUIRED HYPOTHYROIDISM: Primary | ICD-10-CM

## 2024-01-04 DIAGNOSIS — R59.0 ANTERIOR CERVICAL ADENOPATHY: ICD-10-CM

## 2024-01-04 DIAGNOSIS — Z00.00 MEDICARE ANNUAL WELLNESS VISIT, SUBSEQUENT: ICD-10-CM

## 2024-01-04 PROCEDURE — 99214 OFFICE O/P EST MOD 30 MIN: CPT | Performed by: FAMILY MEDICINE

## 2024-01-04 PROCEDURE — G0439 PPPS, SUBSEQ VISIT: HCPCS | Performed by: FAMILY MEDICINE

## 2024-01-04 NOTE — PROGRESS NOTES
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)     Other Visit Diagnoses       Elevated blood sugar level        Relevant Orders    Lipid panel    Hemoglobin A1C    TSH, 3rd generation    Hyperlipidemia, unspecified hyperlipidemia type        Relevant Orders    Lipid panel    Hemoglobin A1C    TSH, 3rd generation    Anterior cervical adenopathy        Relevant Orders    US head neck soft tissue    Arthritis        Patient wants to use topical Voltaren    Relevant Medications    Diclofenac Sodium (VOLTAREN) 1 %    Medicare annual wellness visit, subsequent                Depression Screening and Follow-up Plan: Patient was screened for depression during today's encounter. They screened negative with a PHQ-2 score of 0.      Preventive health issues were discussed with patient, and age appropriate screening tests were ordered as noted in patient's After Visit Summary.  Personalized health advice and appropriate referrals for health education or preventive services given if needed, as noted in patient's After Visit Summary.     History of Present Illness:     Patient presents for a Medicare Wellness Visit    Here  for f/u. Still feels a clicking in the L side of the neck when he swallows. No difficulty with swallowing. No change in diet. BP controlled. Lipids at goal. Sugfar borderline but A1C has been controlled. Gets labs for oncologist every 3 months for prostate CA. Trying to keep PSA as low as possible. Had radiation therapy. CA spread to the lymph nodes around the prostate.        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)    Mass of left wrist    History of prostate cancer      Past Medical and Surgical History:     Past Medical History:   Diagnosis Date    Disease of thyroid gland     last assessed 10/13/2016    History of radiation therapy     Impression 02/18/2016, of thymus as an infant    Primary osteoarthritis of left knee     last assessed 04/13/2016    Prostate cancer (HCC)      Past Surgical History:   Procedure Laterality Date    COLONOSCOPY      COLONOSCOPY      FL BLEPHAROPLASTY UPPER EYELID W/EXCESSIVE SKIN Bilateral 12/5/2022    Procedure: BLEPHAROPLASTY UPPER;  Surgeon: Hair Cespedes MD;  Location:  MAIN OR;  Service: Plastics    PROSTATE BIOPSY      REPLACEMENT TOTAL KNEE Left     TOTAL SHOULDER REPLACEMENT Right       Family History:     Family History   Problem Relation Age of Onset    Heart disease Father     Heart failure Father     Dementia Mother       Social History:     Social History     Socioeconomic History    Marital status: /Civil Union     Spouse name: None    Number of children: None    Years of education: None    Highest education level: None   Occupational History    None   Tobacco Use    Smoking status: Never     Passive exposure: Past    Smokeless tobacco: Never   Vaping Use    Vaping status: Never Used   Substance and Sexual Activity    Alcohol use: Yes     Alcohol/week: 14.0 standard drinks of alcohol     Types: 14 Glasses of wine per week     Comment: couple glasses of wine every night    Drug use: Never    Sexual activity: None   Other Topics Concern    None   Social History Narrative    None     Social Determinants of Health     Financial Resource Strain: Low Risk  (1/4/2024)    Overall Financial Resource Strain (CARDIA)     Difficulty of Paying Living Expenses: Not hard at all   Food Insecurity: Not on file   Transportation Needs: No  Transportation Needs (1/4/2024)    PRAPARE - Transportation     Lack of Transportation (Medical): No     Lack of Transportation (Non-Medical): No   Physical Activity: Not on file   Stress: Not on file   Social Connections: Not on file   Intimate Partner Violence: Not on file   Housing Stability: Not on file      Medications and Allergies:     Current Outpatient Medications   Medication Sig Dispense Refill    acetaminophen (TYLENOL) 500 mg tablet Take 500 mg by mouth every 6 (six) hours as needed for mild pain      Calcium-Vitamin D-Vitamin K (VIACTIV CALCIUM PLUS D PO) Take by mouth      Cholecalciferol 25 MCG (1000 UT) capsule Take 1,000 Units by mouth daily      Diclofenac Sodium (VOLTAREN) 1 % Apply 2 g topically 4 (four) times a day 300 g 0    leuprolide (ELIGARD 6 MONTH KIT) 45 MG subcutaneous injection Inject 45 mg under the skin every 3 (three) months      Misc Natural Products (GLUCOSAMINE CHONDROITIN ADV PO) Take 1 tablet by mouth daily      Omega-3 Fatty Acids (FISH OIL OMEGA-3) 1000 MG CAPS Take 2 g by mouth daily      SALINE NASAL SPRAY NA into each nostril      doxycycline hyclate (VIBRAMYCIN) 100 mg capsule  (Patient not taking: Reported on 12/18/2023)      guaiFENesin (MUCINEX) 600 mg 12 hr tablet Take 1,200 mg by mouth 2 (two) times a day As needed (Patient not taking: Reported on 1/4/2024)      loratadine (CLARITIN) 10 mg tablet Take 10 mg by mouth daily (Patient not taking: Reported on 1/4/2024)      methylPREDNISolone 4 MG tablet therapy pack Use as directed on package (Patient not taking: Reported on 12/18/2023) 21 each 0    minoxidil (ROGAINE) 2 % external solution Apply topically (Patient not taking: Reported on 12/18/2023)      Multiple Vitamins-Minerals (VITAMIN D3 COMPLETE PO) Take by mouth (Patient not taking: Reported on 12/18/2023)      naproxen (NAPROSYN) 500 mg tablet as needed (Patient not taking: Reported on 12/18/2023)      ondansetron (ZOFRAN) 4 mg tablet Take 1 tablet (4 mg total)  by mouth every 6 (six) hours as needed for nausea or vomiting for up to 6 doses (Patient not taking: Reported on 12/18/2023) 6 tablet 0     No current facility-administered medications for this visit.     No Known Allergies   Immunizations:     Immunization History   Administered Date(s) Administered    COVID-19 MODERNA VACC 0.5 ML IM 02/04/2021, 03/03/2021, 07/18/2022    COVID-19 Moderna Vac BIVALENT 12 Yr+ IM 0.5 ML 02/25/2023    INFLUENZA 11/04/2010, 10/03/2011, 10/20/2012, 10/03/2013, 10/15/2014, 09/19/2015, 10/13/2016, 09/20/2017, 09/16/2018, 08/29/2021    Influenza Split High Dose Preservative Free IM 09/19/2015, 10/13/2016, 08/20/2020    Influenza, high dose seasonal 0.7 mL 08/20/2020    Pneumococcal Conjugate 13-Valent 07/29/2016, 09/01/2017    Pneumococcal Polysaccharide PPV23 07/13/2016, 09/24/2017    Tdap 04/20/2011, 09/17/2019    Zoster 01/20/2011    Zoster Vaccine Recombinant 05/14/2019, 08/18/2019    influenza, trivalent, adjuvanted 09/12/2019      Health Maintenance:         Topic Date Due    Colorectal Cancer Screening  04/06/2027    Hepatitis C Screening  Completed         Topic Date Due    COVID-19 Vaccine (5 - 2023-24 season) 11/16/2023      Medicare Screening Tests and Risk Assessments:     Ronny is here for his Subsequent Wellness visit. Last Medicare Wellness visit information reviewed, patient interviewed and updates made to the record today.      Health Risk Assessment:   Patient rates overall health as good. Patient feels that their physical health rating is slightly better. Patient is satisfied with their life. Eyesight was rated as same. Hearing was rated as same. Patient feels that their emotional and mental health rating is much better. Patients states they are never, rarely angry. Patient states they are sometimes unusually tired/fatigued. Pain experienced in the last 7 days has been a lot. Patient's pain rating has been 5/10. Patient states that he has experienced no weight loss or  gain in last 6 months.     Depression Screening:   PHQ-2 Score: 0      Fall Risk Screening:   In the past year, patient has experienced: no history of falling in past year      Home Safety:  Patient does not have trouble with stairs inside or outside of their home. Patient has working smoke alarms and has working carbon monoxide detector. Home safety hazards include: none.     Nutrition:   Current diet is Regular.     Medications:   Patient is currently taking over-the-counter supplements. OTC medications include: see medication list. Patient is able to manage medications.     Activities of Daily Living (ADLs)/Instrumental Activities of Daily Living (IADLs):   Walk and transfer into and out of bed and chair?: Yes  Dress and groom yourself?: Yes    Bathe or shower yourself?: Yes    Feed yourself? Yes  Do your laundry/housekeeping?: Yes  Manage your money, pay your bills and track your expenses?: Yes  Make your own meals?: Yes    Do your own shopping?: Yes    Previous Hospitalizations:   Any hospitalizations or ED visits within the last 12 months?: No      Advance Care Planning:   Living will: Yes    Advanced directive: Yes      Cognitive Screening:   Provider or family/friend/caregiver concerned regarding cognition?: No    PREVENTIVE SCREENINGS      Cardiovascular Screening:    General: Screening Current      Diabetes Screening:     General: Screening Current      Colorectal Cancer Screening:     General: Screening Current      Prostate Cancer Screening:    General: History Prostate Cancer      Osteoporosis Screening:    General: Screening Current      Abdominal Aortic Aneurysm (AAA) Screening:    Risk factors include: age between 65-76 yo        General: Screening Not Indicated      Lung Cancer Screening:     General: Screening Not Indicated      Hepatitis C Screening:    General: Screening Current    Screening, Brief Intervention, and Referral to Treatment (SBIRT)    Screening    Typical number of drinks in a week:  "14    Single Item Drug Screening:  How often have you used an illegal drug (including marijuana) or a prescription medication for non-medical reasons in the past year? never    Single Item Drug Screen Score: 0  Interpretation: Negative screen for possible drug use disorder    No results found.     Physical Exam:     /80 (BP Location: Left arm, Patient Position: Sitting, Cuff Size: Standard)   Pulse 83   Temp 97.9 °F (36.6 °C) (Temporal)   Ht 5' 9\" (1.753 m)   Wt 93.9 kg (207 lb)   SpO2 95%   BMI 30.57 kg/m²     Physical Exam  Vitals reviewed.   Constitutional:       Appearance: Normal appearance.   HENT:      Right Ear: Tympanic membrane, ear canal and external ear normal.      Left Ear: Tympanic membrane, ear canal and external ear normal.   Cardiovascular:      Rate and Rhythm: Normal rate and regular rhythm.      Heart sounds: Normal heart sounds.   Pulmonary:      Effort: Pulmonary effort is normal.      Breath sounds: Normal breath sounds.   Musculoskeletal:      Right lower leg: No edema.      Left lower leg: No edema.   Skin:     Comments: Lump under L side of jaw, mobile, within the skin, about 1-2cm across, smooth, well differentiated   Neurological:      General: No focal deficit present.      Mental Status: He is alert and oriented to person, place, and time.   Psychiatric:         Mood and Affect: Mood normal.         Behavior: Behavior normal.         Thought Content: Thought content normal.         Judgment: Judgment normal.          Corry Augustin MD  "

## 2024-01-04 NOTE — PATIENT INSTRUCTIONS
Medicare Preventive Visit Patient Instructions  Thank you for completing your Welcome to Medicare Visit or Medicare Annual Wellness Visit today. Your next wellness visit will be due in one year (1/4/2025).  The screening/preventive services that you may require over the next 5-10 years are detailed below. Some tests may not apply to you based off risk factors and/or age. Screening tests ordered at today's visit but not completed yet may show as past due. Also, please note that scanned in results may not display below.  Preventive Screenings:  Service Recommendations Previous Testing/Comments   Colorectal Cancer Screening  Colonoscopy    Fecal Occult Blood Test (FOBT)/Fecal Immunochemical Test (FIT)  Fecal DNA/Cologuard Test  Flexible Sigmoidoscopy Age: 45-75 years old   Colonoscopy: every 10 years (May be performed more frequently if at higher risk)  OR  FOBT/FIT: every 1 year  OR  Cologuard: every 3 years  OR  Sigmoidoscopy: every 5 years  Screening may be recommended earlier than age 45 if at higher risk for colorectal cancer. Also, an individualized decision between you and your healthcare provider will decide whether screening between the ages of 76-85 would be appropriate. Colonoscopy: 04/06/2017  FOBT/FIT: Not on file  Cologuard: Not on file  Sigmoidoscopy: Not on file    Screening Current     Prostate Cancer Screening Individualized decision between patient and health care provider in men between ages of 55-69   Medicare will cover every 12 months beginning on the day after your 50th birthday PSA: 3.6 ng/mL     History Prostate Cancer     Hepatitis C Screening Once for adults born between 1945 and 1965  More frequently in patients at high risk for Hepatitis C Hep C Antibody: 09/10/2020    Screening Current   Diabetes Screening 1-2 times per year if you're at risk for diabetes or have pre-diabetes Fasting glucose: 116 mg/dL (5/24/2022)  A1C: 5.1 % (5/24/2022)      Cholesterol Screening Once every 5 years if  you don't have a lipid disorder. May order more often based on risk factors. Lipid panel: 05/24/2022  Screening Current      Other Preventive Screenings Covered by Medicare:  Abdominal Aortic Aneurysm (AAA) Screening: covered once if your at risk. You're considered to be at risk if you have a family history of AAA or a male between the age of 65-75 who smoking at least 100 cigarettes in your lifetime.  Lung Cancer Screening: covers low dose CT scan once per year if you meet all of the following conditions: (1) Age 55-77; (2) No signs or symptoms of lung cancer; (3) Current smoker or have quit smoking within the last 15 years; (4) You have a tobacco smoking history of at least 20 pack years (packs per day x number of years you smoked); (5) You get a written order from a healthcare provider.  Glaucoma Screening: covered annually if you're considered high risk: (1) You have diabetes OR (2) Family history of glaucoma OR (3)  aged 50 and older OR (4)  American aged 65 and older  Osteoporosis Screening: covered every 2 years if you meet one of the following conditions: (1) Have a vertebral abnormality; (2) On glucocorticoid therapy for more than 3 months; (3) Have primary hyperparathyroidism; (4) On osteoporosis medications and need to assess response to drug therapy.  HIV Screening: covered annually if you're between the age of 15-65. Also covered annually if you are younger than 15 and older than 65 with risk factors for HIV infection. For pregnant patients, it is covered up to 3 times per pregnancy.    Immunizations:  Immunization Recommendations   Influenza Vaccine Annual influenza vaccination during flu season is recommended for all persons aged >= 6 months who do not have contraindications   Pneumococcal Vaccine   * Pneumococcal conjugate vaccine = PCV13 (Prevnar 13), PCV15 (Vaxneuvance), PCV20 (Prevnar 20)  * Pneumococcal polysaccharide vaccine = PPSV23 (Pneumovax) Adults 19-63 yo with  certain risk factors or if 65+ yo  If never received any pneumonia vaccine: recommend Prevnar 20 (PCV20)  Give PCV20 if previously received 1 dose of PCV13 or PPSV23   Hepatitis B Vaccine 3 dose series if at intermediate or high risk (ex: diabetes, end stage renal disease, liver disease)   Respiratory syncytial virus (RSV) Vaccine - COVERED BY MEDICARE PART D  * RSVPreF3 (Arexvy) CDC recommends that adults 60 years of age and older may receive a single dose of RSV vaccine using shared clinical decision-making (SCDM)   Tetanus (Td) Vaccine - COST NOT COVERED BY MEDICARE PART B Following completion of primary series, a booster dose should be given every 10 years to maintain immunity against tetanus. Td may also be given as tetanus wound prophylaxis.   Tdap Vaccine - COST NOT COVERED BY MEDICARE PART B Recommended at least once for all adults. For pregnant patients, recommended with each pregnancy.   Shingles Vaccine (Shingrix) - COST NOT COVERED BY MEDICARE PART B  2 shot series recommended in those 19 years and older who have or will have weakened immune systems or those 50 years and older     Health Maintenance Due:      Topic Date Due   • Colorectal Cancer Screening  04/06/2027   • Hepatitis C Screening  Completed     Immunizations Due:      Topic Date Due   • COVID-19 Vaccine (5 - 2023-24 season) 11/16/2023     Advance Directives   What are advance directives?  Advance directives are legal documents that state your wishes and plans for medical care. These plans are made ahead of time in case you lose your ability to make decisions for yourself. Advance directives can apply to any medical decision, such as the treatments you want, and if you want to donate organs.   What are the types of advance directives?  There are many types of advance directives, and each state has rules about how to use them. You may choose a combination of any of the following:  Living will:  This is a written record of the treatment you  want. You can also choose which treatments you do not want, which to limit, and which to stop at a certain time. This includes surgery, medicine, IV fluid, and tube feedings.   Durable power of  for healthcare (DPAHC):  This is a written record that states who you want to make healthcare choices for you when you are unable to make them for yourself. This person, called a proxy, is usually a family member or a friend. You may choose more than 1 proxy.  Do not resuscitate (DNR) order:  A DNR order is used in case your heart stops beating or you stop breathing. It is a request not to have certain forms of treatment, such as CPR. A DNR order may be included in other types of advance directives.  Medical directive:  This covers the care that you want if you are in a coma, near death, or unable to make decisions for yourself. You can list the treatments you want for each condition. Treatment may include pain medicine, surgery, blood transfusions, dialysis, IV or tube feedings, and a ventilator (breathing machine).  Values history:  This document has questions about your views, beliefs, and how you feel and think about life. This information can help others choose the care that you would choose.  Why are advance directives important?  An advance directive helps you control your care. Although spoken wishes may be used, it is better to have your wishes written down. Spoken wishes can be misunderstood, or not followed. Treatments may be given even if you do not want them. An advance directive may make it easier for your family to make difficult choices about your care.   Weight Management   Why it is important to manage your weight:  Being overweight increases your risk of health conditions such as heart disease, high blood pressure, type 2 diabetes, and certain types of cancer. It can also increase your risk for osteoarthritis, sleep apnea, and other respiratory problems. Aim for a slow, steady weight loss. Even a  small amount of weight loss can lower your risk of health problems.  How to lose weight safely:  A safe and healthy way to lose weight is to eat fewer calories and get regular exercise. You can lose up about 1 pound a week by decreasing the number of calories you eat by 500 calories each day.   Healthy meal plan for weight management:  A healthy meal plan includes a variety of foods, contains fewer calories, and helps you stay healthy. A healthy meal plan includes the following:  Eat whole-grain foods more often.  A healthy meal plan should contain fiber. Fiber is the part of grains, fruits, and vegetables that is not broken down by your body. Whole-grain foods are healthy and provide extra fiber in your diet. Some examples of whole-grain foods are whole-wheat breads and pastas, oatmeal, brown rice, and bulgur.  Eat a variety of vegetables every day.  Include dark, leafy greens such as spinach, kale, ella greens, and mustard greens. Eat yellow and orange vegetables such as carrots, sweet potatoes, and winter squash.   Eat a variety of fruits every day.  Choose fresh or canned fruit (canned in its own juice or light syrup) instead of juice. Fruit juice has very little or no fiber.  Eat low-fat dairy foods.  Drink fat-free (skim) milk or 1% milk. Eat fat-free yogurt and low-fat cottage cheese. Try low-fat cheeses such as mozzarella and other reduced-fat cheeses.  Choose meat and other protein foods that are low in fat.  Choose beans or other legumes such as split peas or lentils. Choose fish, skinless poultry (chicken or turkey), or lean cuts of red meat (beef or pork). Before you cook meat or poultry, cut off any visible fat.   Use less fat and oil.  Try baking foods instead of frying them. Add less fat, such as margarine, sour cream, regular salad dressing and mayonnaise to foods. Eat fewer high-fat foods. Some examples of high-fat foods include french fries, doughnuts, ice cream, and cakes.  Eat fewer sweets.   Limit foods and drinks that are high in sugar. This includes candy, cookies, regular soda, and sweetened drinks.  Exercise:  Exercise at least 30 minutes per day on most days of the week. Some examples of exercise include walking, biking, dancing, and swimming. You can also fit in more physical activity by taking the stairs instead of the elevator or parking farther away from stores. Ask your healthcare provider about the best exercise plan for you.      © Copyright [employer] 2018 Information is for End User's use only and may not be sold, redistributed or otherwise used for commercial purposes. All illustrations and images included in CareNotes® are the copyrighted property of [employer] or [employer]

## 2024-01-16 ENCOUNTER — HOSPITAL ENCOUNTER (OUTPATIENT)
Dept: RADIOLOGY | Facility: MEDICAL CENTER | Age: 74
Discharge: HOME | End: 2024-01-16
Payer: COMMERCIAL

## 2024-01-16 DIAGNOSIS — R59.0 ANTERIOR CERVICAL ADENOPATHY: ICD-10-CM

## 2024-01-16 PROCEDURE — 76536 US EXAM OF HEAD AND NECK: CPT

## 2024-01-31 LAB — HBA1C MFR BLD HPLC: 5.5 %

## 2024-02-07 DIAGNOSIS — M19.90 ARTHRITIS: ICD-10-CM

## 2024-03-15 LAB
B BURGDOR AB SER-IMP: NORMAL
B BURGDOR IGG+IGM SER-ACNC: NEGATIVE INDEX
B BURGDOR IGM SER IA-ACNC: NEGATIVE INDEX

## 2024-07-10 ENCOUNTER — RA CDI HCC (OUTPATIENT)
Dept: OTHER | Facility: HOSPITAL | Age: 74
End: 2024-07-10

## 2024-07-18 ENCOUNTER — OFFICE VISIT (OUTPATIENT)
Dept: FAMILY MEDICINE CLINIC | Facility: CLINIC | Age: 74
End: 2024-07-18
Payer: COMMERCIAL

## 2024-07-18 ENCOUNTER — APPOINTMENT (OUTPATIENT)
Dept: LAB | Facility: MEDICAL CENTER | Age: 74
End: 2024-07-18
Payer: COMMERCIAL

## 2024-07-18 VITALS
WEIGHT: 205 LBS | OXYGEN SATURATION: 96 % | SYSTOLIC BLOOD PRESSURE: 122 MMHG | TEMPERATURE: 97.3 F | HEIGHT: 69 IN | HEART RATE: 88 BPM | DIASTOLIC BLOOD PRESSURE: 88 MMHG | BODY MASS INDEX: 30.36 KG/M2

## 2024-07-18 DIAGNOSIS — C77.5 PROSTATE CANCER METASTATIC TO INTRAPELVIC LYMPH NODE (HCC): Primary | ICD-10-CM

## 2024-07-18 DIAGNOSIS — E78.2 MIXED HYPERLIPIDEMIA: ICD-10-CM

## 2024-07-18 DIAGNOSIS — E03.9 ACQUIRED HYPOTHYROIDISM: ICD-10-CM

## 2024-07-18 DIAGNOSIS — E78.5 HYPERLIPIDEMIA, UNSPECIFIED HYPERLIPIDEMIA TYPE: ICD-10-CM

## 2024-07-18 DIAGNOSIS — C61 PROSTATE CANCER METASTATIC TO INTRAPELVIC LYMPH NODE (HCC): Primary | ICD-10-CM

## 2024-07-18 DIAGNOSIS — R22.1 LUMP ON NECK: ICD-10-CM

## 2024-07-18 DIAGNOSIS — R73.9 ELEVATED BLOOD SUGAR LEVEL: ICD-10-CM

## 2024-07-18 LAB
CHOLEST SERPL-MCNC: 228 MG/DL
EST. AVERAGE GLUCOSE BLD GHB EST-MCNC: 114 MG/DL
HBA1C MFR BLD: 5.6 %
HDLC SERPL-MCNC: 84 MG/DL
LDLC SERPL CALC-MCNC: 97 MG/DL (ref 0–100)
NONHDLC SERPL-MCNC: 144 MG/DL
TRIGL SERPL-MCNC: 233 MG/DL
TSH SERPL DL<=0.05 MIU/L-ACNC: 3.8 UIU/ML (ref 0.45–4.5)

## 2024-07-18 PROCEDURE — 83036 HEMOGLOBIN GLYCOSYLATED A1C: CPT

## 2024-07-18 PROCEDURE — 80061 LIPID PANEL: CPT | Performed by: FAMILY MEDICINE

## 2024-07-18 PROCEDURE — 99214 OFFICE O/P EST MOD 30 MIN: CPT | Performed by: FAMILY MEDICINE

## 2024-07-18 PROCEDURE — G2211 COMPLEX E/M VISIT ADD ON: HCPCS | Performed by: FAMILY MEDICINE

## 2024-07-18 PROCEDURE — 84443 ASSAY THYROID STIM HORMONE: CPT

## 2024-07-18 PROCEDURE — 36415 COLL VENOUS BLD VENIPUNCTURE: CPT

## 2024-07-18 RX ORDER — CICLOPIROX 80 MG/ML
SOLUTION TOPICAL
COMMUNITY
Start: 2024-06-19

## 2024-07-29 ENCOUNTER — HOSPITAL ENCOUNTER (OUTPATIENT)
Dept: RADIOLOGY | Facility: MEDICAL CENTER | Age: 74
Discharge: HOME | End: 2024-07-29
Payer: COMMERCIAL

## 2024-07-29 DIAGNOSIS — R22.1 LUMP ON NECK: ICD-10-CM

## 2024-07-29 PROCEDURE — 76536 US EXAM OF HEAD AND NECK: CPT

## 2024-08-06 ENCOUNTER — OFFICE VISIT (OUTPATIENT)
Dept: DERMATOLOGY | Facility: CLINIC | Age: 74
End: 2024-08-06
Payer: COMMERCIAL

## 2024-08-06 VITALS — HEIGHT: 69 IN | TEMPERATURE: 97.2 F | WEIGHT: 198 LBS | BODY MASS INDEX: 29.33 KG/M2

## 2024-08-06 DIAGNOSIS — D18.01 CHERRY ANGIOMA: ICD-10-CM

## 2024-08-06 DIAGNOSIS — D22.5 MULTIPLE BENIGN MELANOCYTIC NEVI OF UPPER AND LOWER EXTREMITIES AND TRUNK: ICD-10-CM

## 2024-08-06 DIAGNOSIS — L85.3 XEROSIS OF SKIN: ICD-10-CM

## 2024-08-06 DIAGNOSIS — D22.72 MULTIPLE BENIGN MELANOCYTIC NEVI OF UPPER AND LOWER EXTREMITIES AND TRUNK: ICD-10-CM

## 2024-08-06 DIAGNOSIS — D22.71 MULTIPLE BENIGN MELANOCYTIC NEVI OF UPPER AND LOWER EXTREMITIES AND TRUNK: ICD-10-CM

## 2024-08-06 DIAGNOSIS — L82.1 SEBORRHEIC KERATOSES: ICD-10-CM

## 2024-08-06 DIAGNOSIS — D22.62 MULTIPLE BENIGN MELANOCYTIC NEVI OF UPPER AND LOWER EXTREMITIES AND TRUNK: ICD-10-CM

## 2024-08-06 DIAGNOSIS — D22.61 MULTIPLE BENIGN MELANOCYTIC NEVI OF UPPER AND LOWER EXTREMITIES AND TRUNK: ICD-10-CM

## 2024-08-06 DIAGNOSIS — L72.0 EIC (EPIDERMAL INCLUSION CYST): ICD-10-CM

## 2024-08-06 DIAGNOSIS — Z12.83 SKIN CANCER SCREENING: Primary | ICD-10-CM

## 2024-08-06 DIAGNOSIS — L81.4 LENTIGINES: ICD-10-CM

## 2024-08-06 PROCEDURE — 99203 OFFICE O/P NEW LOW 30 MIN: CPT | Performed by: DERMATOLOGY

## 2024-08-06 NOTE — PROGRESS NOTES
"[facility] Dermatology Clinic Note     Patient Name: Ronny Fisher  Encounter Date: 08/06/2024     Have you been cared for by a [facility] Dermatologist in the last 3 years and, if so, which description applies to you?    NO.   I am considered a \"new\" patient and must complete all patient intake questions. I am MALE/not capable of bearing children.    REVIEW OF SYSTEMS:  Have you recently had or currently have any of the following? Recent fever or chills? No  Any non-healing wound? No   PAST MEDICAL HISTORY:  Have you personally ever had or currently have any of the following?  If \"YES,\" then please provide more detail. Skin cancer (such as Melanoma, Basal Cell Carcinoma, Squamous Cell Carcinoma?  No  Tuberculosis, HIV/AIDS, Hepatitis B or C: No  Radiation Treatment YES, started in 2019 and had 5 treatments this year   HISTORY OF IMMUNOSUPPRESSION:   Do you have a history of any of the following:  Systemic Immunosuppression such as Diabetes, Biologic or Immunotherapy, Chemotherapy, Organ Transplantation, Bone Marrow Transplantation?  YES, pt is on hormones      Answering \"YES\" requires the addition of the dotphrase \"IMMUNOSUPPRESSED\" as the first diagnosis of the patient's visit.   FAMILY HISTORY:  Any \"first degree relatives\" (parent, brother, sister, or child) with the following?    Skin Cancer, Pancreatic or Other Cancer? No   PATIENT EXPERIENCE:    Do you want the Dermatologist to perform a COMPLETE skin exam today including a clinical examination under the \"bra and underwear\" areas?  Yes  If necessary, do we have your permission to call and leave a detailed message on your Preferred Phone number that includes your specific medical information?  Yes      No Known Allergies   Current Outpatient Medications:     acetaminophen (TYLENOL) 500 mg tablet, Take 500 mg by mouth every 6 (six) hours as needed for mild pain, Disp: , Rfl:     Calcium-Vitamin D-Vitamin K (VIACTIV CALCIUM PLUS D PO), Take by mouth, Disp: , " Rfl:     Cholecalciferol 25 MCG (1000 UT) capsule, Take 1,000 Units by mouth daily, Disp: , Rfl:     ciclopirox (PENLAC) 8 % solution, APPLY ENOUGH TO COVER TOPICALLY DAILY., Disp: , Rfl:     Diclofenac Sodium (VOLTAREN) 1 %, APPLY 2 GRAMS TO AFFECTED AREA 4 TIMES A DAY, Disp: 300 g, Rfl: 4    enzalutamide (XTANDI) 40 mg capsule, Take 160 mg by mouth daily, Disp: , Rfl:     leuprolide (ELIGARD 6 MONTH KIT) 45 MG subcutaneous injection, Inject 45 mg under the skin every 3 (three) months, Disp: , Rfl:     minoxidil (ROGAINE) 2 % external solution, Apply topically, Disp: , Rfl:     Misc Natural Products (GLUCOSAMINE CHONDROITIN ADV PO), Take 1 tablet by mouth daily, Disp: , Rfl:     Multiple Vitamins-Minerals (VITAMIN D3 COMPLETE PO), Take by mouth (Patient not taking: Reported on 12/18/2023), Disp: , Rfl:     Omega-3 Fatty Acids (FISH OIL OMEGA-3) 1000 MG CAPS, Take 2 g by mouth daily, Disp: , Rfl:     SALINE NASAL SPRAY NA, into each nostril (Patient not taking: Reported on 7/18/2024), Disp: , Rfl:           Whom besides the patient is providing clinical information about today's encounter?   NO ADDITIONAL HISTORIAN (patient alone provided history)    Physical Exam and Assessment/Plan by Diagnosis:    New pt with SOC on right shoulder, causes pressure. 2 areas on center of back. Pt has prostate cancer and gets radiation treatment. No hx of skin cancer.    EPIDERMAL INCLUSION CYST    Physical Exam:  Anatomic Location Affected:  back  Morphological Description:  subcutaneous nodules, left upper back 3 cm x 2.5 cm; right mid back 2 cm x 1.5 cm; right shoulder 2.5 cm x 2.3 cm  Pertinent Positives:  Pertinent Negatives:    Additional History of Present Condition:  pt has a few cysts on the back. He previously had one removed on his wrist by ortho years ago. Pt would like to have cysts on back removed.    Assessment and Plan:  Based on a thorough discussion of this condition and the management approach to it (including a  comprehensive discussion of the known risks, side effects and potential benefits of treatment), the patient (family) agrees to implement the following specific plan:  Benign; reassured   Scheduled to have removed - if issues with scheduling can schedule with plastic surgery or general surgery   Discussed with effieetn and wife that our prior authorization team will look for insurance approval for procedure; however out of pocket costs will be dependent on his plan and deductible and he would need to contact his insurance for that information    What are epidermal inclusion cysts?  Epidermal inclusion cysts are the most common, benign cutaneous cysts. There are many different names for epidermal inclusion cysts, including epidermoid cyst, epidermal cyst, infundibular cyst, inclusion cyst, and keratin cyst. These cysts can occur anywhere on the body and typically present as nodules directly underneath the skin. There is often a visible pore or opening in the center. The cysts are freely moveable and can range from a few millimeters to several centimeters in diameter. The center of epidermoid cysts almost always contains keratin, which has a cheesy appearance, and not sebum. They also do not originate from sebaceous glands. Therefore, epidermal inclusion cysts are not the same as sebaceous cysts.    Cysts may remain stable or progressively enlarge over time. There are no reliable predictive factors to tell if an epidermal inclusion cyst will enlarge, become inflamed, or remain quiescent. Infected cysts tend to become larger, turn red, and are more noticeable to the patient. There may be accompanying pain and discomfort.     What causes epidermal inclusion cysts?  Epidermal inclusion cysts often appear out of the blue and are not contagious. They are due to a proliferation of epidermal cells within the dermis and are more common in men than women. They occur more frequently in patients in their 20s to 40s. Epidermal  inclusion cysts by themselves are usually not inherited, but they can be hereditary in rare syndromes such as Brewer syndrome, nodular elastosis with cysts and comedones (Favre-Racouchot syndrome), and basal cell nevus syndrome (Gorlin syndrome). Elderly patients with chronic sun-damaged skin areas have a higher likelihood of developing epidermoid cysts. They often occur in areas where hair follicles have been inflamed or repeatedly irritated are more frequent in patients with acne vulgaris. In the  period, they are called milia.     Patients on BRAF inhibitors such as imiquimod and cyclosporine have a higher incidence of epidermoid cysts of the face.    How do we diagnose an epidermal inclusion cyst?  Epidermoid inclusion cysts are often diagnosed by history and physical exam. There is usually no need for biopsy prior to removal.  Radiographic and laboratory exams, such as ultrasound studies, are unnecessary and not typically ordered unless the practitioner suspects a genetic condition.    What is the treatment for an epidermal inclusion cyst?  Inflamed, uninfected epidermal inclusion cysts rarely resolve spontaneously without therapy or surgical intervention. Treatment is not emergent unless desired by the patient.     Definitive treatment is via surgical excision with walls intact. This method will prevent recurrence. This is best done when the cyst is not inflamed, to decrease the probability of rupture during surgery.   A local anesthetic will be injected around the cyst  A small incision is made in the skin overlying the cyst, and contents are expressed  The incision is repaired with sutures    Another option is to use a 4mm punch biopsy with cyst extraction through the defect.    Incision and drainage is often needed if the cyst is infected or inflamed. If there is surrounding cellulitis, oral antibiotic therapy may be necessary. The common agents used target methicillin sensitive Staphylococcal  "aureus and methicillin resistant S aureus in areas of high prevalence.      MELANOCYTIC NEVI (\"Moles\")    Physical Exam:  Anatomic Location Affected:   Mostly on sun-exposed areas of the trunk and extremities  Morphological Description:  Scattered, 1-4mm round to ovoid, symmetrical-appearing, even bordered, skin colored to dark brown macules/papules, mostly in sun-exposed areas  Pertinent Positives:  Pertinent Negatives:    Additional History of Present Condition:      Assessment and Plan:  Based on a thorough discussion of this condition and the management approach to it (including a comprehensive discussion of the known risks, side effects and potential benefits of treatment), the patient (family) agrees to implement the following specific plan:  When outside we recommend using a wide brim hat, sunglasses, long sleeve and pants, sunscreen with SPF 30+ with reapplication every 2 hours, or SPF specific clothing   Benign, reassured  Annual skin check     Melanocytic Nevi  Melanocytic nevi (\"moles\") are tan or brown, raised or flat areas of the skin which have an increased number of melanocytes. Melanocytes are the cells in our body which make pigment and account for skin color.    Some moles are present at birth (I.e., \"congenital nevi\"), while others come up later in life (i.e., \"acquired nevi\").  The sun can stimulate the body to make more moles.  Sunburns are not the only thing that triggers more moles.  Chronic sun exposure can do it too.     Clinically distinguishing a healthy mole from melanoma may be difficult, even for experienced dermatologists. The \"ABCDE's\" of moles have been suggested as a means of helping to alert a person to a suspicious mole and the possible increased risk of melanoma.  The suggestions for raising alert are as follows:    Asymmetry: Healthy moles tend to be symmetric, while melanomas are often asymmetric.  Asymmetry means if you draw a line through the mole, the two halves do not match " "in color, size, shape, or surface texture. Asymmetry can be a result of rapid enlargement of a mole, the development of a raised area on a previously flat lesion, scaling, ulceration, bleeding or scabbing within the mole.  Any mole that starts to demonstrate \"asymmetry\" should be examined promptly by a board certified dermatologist.     Border: Healthy moles tend to have discrete, even borders.  The border of a melanoma often blends into the normal skin and does not sharply delineate the mole from normal skin.  Any mole that starts to demonstrate \"uneven borders\" should be examined promptly by a board certified dermatologist.     Color: Healthy moles tend to be one color throughout.  Melanomas tend to be made up of different colors ranging from dark black, blue, white, or red.  Any mole that demonstrates a color change should be examined promptly by a board certified dermatologist.     Diameter: Healthy moles tend to be smaller than 0.6 cm in size; an exception are \"congenital nevi\" that can be larger.  Melanomas tend to grow and can often be greater than 0.6 cm (1/4 of an inch, or the size of a pencil eraser). This is only a guideline, and many normal moles may be larger than 0.6 cm without being unhealthy.  Any mole that starts to change in size (small to bigger or bigger to smaller) should be examined promptly by a board certified dermatologist.     Evolving: Healthy moles tend to \"stay the same.\"  Melanomas may often show signs of change or evolution such as a change in size, shape, color, or elevation.  Any mole that starts to itch, bleed, crust, burn, hurt, or ulcerate or demonstrate a change or evolution should be examined promptly by a board certified dermatologist.        LENTIGO    Physical Exam:  Anatomic Location Affected:  trunk, arms  Morphological Description:  Light brown macules  Pertinent Positives:  Pertinent Negatives:    Additional History of Present Condition:      Assessment and Plan:  Based " on a thorough discussion of this condition and the management approach to it (including a comprehensive discussion of the known risks, side effects and potential benefits of treatment), the patient (family) agrees to implement the following specific plan:  When outside we recommend using a wide brim hat, sunglasses, long sleeve and pants, sunscreen with SPF 30+ with reapplication every 2 hours, or SPF specific clothing       What is a lentigo?  A lentigo is a pigmented flat or slightly raised lesion with a clearly defined edge. Unlike an ephelis (freckle), it does not fade in the winter months. There are several kinds of lentigo.  The name lentigo originally referred to its appearance resembling a small lentil. The plural of lentigo is lentigines, although “lentigos” is also in common use.    Who gets lentigines?  Lentigines can affect males and females of all ages and races. Solar lentigines are especially prevalent in fair skinned adults. Lentigines associated with syndromes are present at birth or arise during childhood.    What causes lentigines?  Common forms of lentigo are due to exposure to ultraviolet radiation:  Sun damage including sunburn   Indoor tanning   Phototherapy, especially photochemotherapy (PUVA)    Ionizing radiation, eg radiation therapy, can also cause lentigines.  Several familial syndromes associated with widespread lentigines originate from mutations in Jose-MAP kinase, mTOR signaling and PTEN pathways.    What is the treatment for lentigines?  Most lentigines are left alone. Attempts to lighten them may not be successful. The following approaches are used:  SPF 50+ broad-spectrum sunscreen   Hydroquinone bleaching cream   Alpha hydroxy acids   Vitamin C   Retinoids   Azelaic acid   Chemical peels  Individual lesions can be permanently removed using:  Cryotherapy   Intense pulsed light   Pigment lasers    How can lentigines be prevented?  Lentigines associated with exposure ultraviolet  "radiation can be prevented by very careful sun protection. Clothing is more successful at preventing new lentigines than are sunscreens.    What is the outlook for lentigines?  Lentigines usually persist. They may increase in number with age and sun exposure. Some in sun-protected sites may fade and disappear.    VIDES ANGIOMAS    Physical Exam:  Anatomic Location Affected:  trunk  Morphological Description:  Scattered cherry red, 1-4 mm papules.  Pertinent Positives:  Pertinent Negatives:    Additional History of Present Condition:      Assessment and Plan:  Based on a thorough discussion of this condition and the management approach to it (including a comprehensive discussion of the known risks, side effects and potential benefits of treatment), the patient (family) agrees to implement the following specific plan:  Monitor for changes  Benign, reassured      Assessment and Plan:    Cherry angioma, also known as English de Balaji spots, are benign vascular skin lesions. A \"cherry angioma\" is a firm red, blue or purple papule, 0.1-1 cm in diameter. When thrombosed, they can appear black in colour until evaluated with a dermatoscope when the red or purple colour is more easily seen. Cherry angioma may develop on any part of the body but most often appear on the scalp, face, lips and trunk.  An angioma is due to proliferating endothelial cells; these are the cells that line the inside of a blood vessel.    Angiomas can arise in early life or later in life; the most common type of angioma is a cherry angioma.  Cherry angiomas are very common in males and females of any age or race. They are more noticeable in white skin than in skin of colour. They markedly increase in number from about the age of 40. There may be a family history of similar lesions. Eruptive cherry angiomas have been rarely reported to be associated with internal malignancy. The cause of angiomas is unknown. Genetic analysis of cherry angiomas has " "shown that they frequently carry specific somatic missense mutations in the GNAQ and GNA11 (Q209H) genes, which are involved in other vascular and melanocytic proliferations.      SEBORRHEIC KERATOSIS; NON-INFLAMED    Physical Exam:  Anatomic Location Affected:  trunk  Morphological Description:  Flat and raised, waxy, smooth to warty textured, yellow to brownish-grey to dark brown to blackish, discrete, \"stuck-on\" appearing papules.  Pertinent Positives:  Pertinent Negatives:    Additional History of Present Condition:      Assessment and Plan:  Based on a thorough discussion of this condition and the management approach to it (including a comprehensive discussion of the known risks, side effects and potential benefits of treatment), the patient (family) agrees to implement the following specific plan:  Monitor for changes  Benign, reassured      Seborrheic Keratosis  A seborrheic keratosis is a harmless warty spot that appears during adult life as a common sign of skin aging.  Seborrheic keratoses can arise on any area of skin, covered or uncovered, with the usual exception of the palms and soles. They do not arise from mucous membranes. Seborrheic keratoses can have highly variable appearance.      Seborrheic keratoses are extremely common. It has been estimated that over 90% of adults over the age of 60 years have one or more of them. They occur in males and females of all races, typically beginning to erupt in the 30s or 40s. They are uncommon under the age of 20 years.  The precise cause of seborrhoeic keratoses is not known.  Seborrhoeic keratoses are considered degenerative in nature. As time goes by, seborrheic keratoses tend to become more numerous. Some people inherit a tendency to develop a very large number of them; some people may have hundreds of them.      There is no easy way to remove multiple lesions on a single occasion.  Unless a specific lesion is \"inflamed\" and is causing pain or " "stinging/burning or is bleeding, most insurance companies do not authorize treatment.    XEROSIS (\"DRY SKIN\")    Physical Exam:  Anatomic Location Affected:  diffuse  Morphological Description:  xerosis  Pertinent Positives:  Pertinent Negatives:    Additional History of Present Condition:      Assessment and Plan:  Based on a thorough discussion of this condition and the management approach to it (including a comprehensive discussion of the known risks, side effects and potential benefits of treatment), the patient (family) agrees to implement the following specific plan:  Use moisturizer like Eucerin,Cerave or Aveeno Cream 3 times a day for the dry skin            Dry skin refers to skin that feels dry to touch. Dry skin has a dull surface with a rough, scaly quality. The skin is less pliable and cracked. When dryness is severe, the skin may become inflamed and fissured.  Although any body site can be dry, dry skin tends to affect the shins more than any other site.    Dry skin is lacking moisture in the outer horny cell layer (stratum corneum) and this results in cracks in the skin surface.  Dry skin is also called xerosis, xeroderma or asteatosis (lack of fat).  It can affect males and females of all ages. There is some racial variability in water and lipid content of the skin.  Dry skin that starts in early childhood may be one of about 20 types of ichthyosis (fish-scale skin). There is often a family history of dry skin.   Dry skin is commonly seen in people with atopic dermatitis.  Nearly everyone > 60 years has dry skin.    Dry skin that begins later may be seen in people with certain diseases and conditions.  Postmenopausal women  Hypothyroidism  Chronic renal disease   Malnutrition and weight loss   Subclinical dermatitis   Treatment with certain drugs such as oral retinoids, diuretics and epidermal growth factor receptor inhibitors      What is the treatment for dry skin?  The mainstay of treatment of dry " skin and ichthyosis is moisturisers/emollients. They should be applied liberally and often enough to:  Reduce itch   Improve the barrier function   Prevent entry of irritants, bacteria   Reduce transepidermal water loss.      How can dry skin be prevented?  Eliminate aggravating factors:  Reduce the frequency of bathing.   A humidifier in winter and air conditioner in summer   Compare having a short shower with a prolonged soak in a bath.   Use lukewarm, not hot, water.   Replace standard soap with a substitute such as a synthetic detergent cleanser, water-miscible emollient, bath oil, anti-pruritic tar oil, colloidal oatmeal etc.   Apply an emollient liberally and often, particularly shortly after bathing, and when itchy. The drier the skin, the thicker this should be, especially on the hands.    What is the outlook for dry skin?  A tendency to dry skin may persist life-long, or it may improve once contributing factors are controlled.       Scribe Attestation      I,:  Keri Schneider MA am acting as a scribe while in the presence of the attending physician.:       I,:  Alysha Brenner MD personally performed the services described in this documentation    as scribed in my presence.:

## 2024-08-23 ENCOUNTER — TELEPHONE (OUTPATIENT)
Age: 74
End: 2024-08-23

## 2024-08-23 NOTE — TELEPHONE ENCOUNTER
Patient's wife called advising that the oncologist told patient that one of the spots on back needs to be removed as soon as possible and scheduled an apt with Dr Hurtado at [facility] [place]/patient was called and his apt was cancelled.  Patient's wife asking if Dr Brenner would see patient before the scheduled apt in December to remove this spot that they're concerned about.    Dr Brenner's note stated if there's issues with scheduling we can schedule with plastics or general surgery.    Checked with plastics and was directed 2 different directions one said I can schedule with a PA and another reply was to refer to general surgery.     Please advise if we can get patient earlier than scheduled apt with Dr Brenner.    Provided general surgery # [email or web address].

## 2024-08-23 NOTE — TELEPHONE ENCOUNTER
Patient called back and stated he was able to get in this Friday with Juab Colton and to disregard his earlier request

## 2025-02-10 ENCOUNTER — OFFICE VISIT (OUTPATIENT)
Dept: FAMILY MEDICINE CLINIC | Facility: CLINIC | Age: 75
End: 2025-02-10
Payer: COMMERCIAL

## 2025-02-10 VITALS
TEMPERATURE: 97.6 F | WEIGHT: 203 LBS | SYSTOLIC BLOOD PRESSURE: 120 MMHG | DIASTOLIC BLOOD PRESSURE: 64 MMHG | HEIGHT: 69 IN | OXYGEN SATURATION: 96 % | HEART RATE: 91 BPM | BODY MASS INDEX: 30.07 KG/M2

## 2025-02-10 DIAGNOSIS — J01.10 ACUTE NON-RECURRENT FRONTAL SINUSITIS: Primary | ICD-10-CM

## 2025-02-10 DIAGNOSIS — C61 PROSTATE CANCER METASTATIC TO INTRAPELVIC LYMPH NODE (HCC): ICD-10-CM

## 2025-02-10 DIAGNOSIS — C77.5 PROSTATE CANCER METASTATIC TO INTRAPELVIC LYMPH NODE (HCC): ICD-10-CM

## 2025-02-10 LAB
CHOLEST SERPL-MCNC: 220 MG/DL
CHOLEST/HDLC SERPL: 2.6 {RATIO}
HDLC SERPL-MCNC: 85 MG/DL (ref 23–92)
LDLC SERPL CALC-MCNC: 118 MG/DL
NONHDLC SERPL-MCNC: 135 MG/DL
TRIGL SERPL-MCNC: 83 MG/DL

## 2025-02-10 PROCEDURE — 99213 OFFICE O/P EST LOW 20 MIN: CPT | Performed by: FAMILY MEDICINE

## 2025-02-10 PROCEDURE — G2211 COMPLEX E/M VISIT ADD ON: HCPCS | Performed by: FAMILY MEDICINE

## 2025-02-10 RX ORDER — FLUTICASONE PROPIONATE 50 MCG
1 SPRAY, SUSPENSION (ML) NASAL DAILY
Qty: 16 G | Refills: 0 | Status: SHIPPED | OUTPATIENT
Start: 2025-02-10

## 2025-02-10 RX ORDER — AZITHROMYCIN 250 MG/1
TABLET, FILM COATED ORAL
Qty: 6 TABLET | Refills: 0 | Status: SHIPPED | OUTPATIENT
Start: 2025-02-10 | End: 2025-02-15

## 2025-02-10 NOTE — PROGRESS NOTES
Name: Ronny Fisher      : 1950      MRN: [id number]  Encounter Provider: Corry Augustin MD  Encounter Date: 2/10/2025   Encounter department: [facility]  :  Assessment & Plan  Acute non-recurrent frontal sinusitis    Orders:    azithromycin (Zithromax) 250 mg tablet; Take 2 tablets (500 mg total) by mouth daily for 1 day, THEN 1 tablet (250 mg total) daily for 4 days.    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 They screened negative with a PHQ-2 score of 1.      History of Present Illness   Here for URI, 4 days, sore throat. Under treatment for prostate CA. PSA stable less than 0.01. Prostate disease has improved, but some local node involvement. Cough, runny nose, ears blocked. Pt has been adequately vaccinated, at pharmacy. BP controlled. Using saline spray, salt water gargles    Sore Throat   Pertinent negatives include no abdominal pain, congestion, diarrhea, headaches, shortness of breath or vomiting.     Review of Systems   Constitutional:  Negative for fatigue and fever.   HENT:  Positive for sore throat. Negative for congestion.    Eyes:  Negative for visual disturbance.   Respiratory:  Negative for chest tightness and shortness of breath.    Cardiovascular:  Negative for chest pain and palpitations.   Gastrointestinal:  Negative for abdominal pain, constipation, diarrhea and vomiting.   Genitourinary:  Positive for frequency. Negative for difficulty urinating.   Musculoskeletal:  Negative for arthralgias.   Neurological:  Negative for headaches.   Hematological:  Does not bruise/bleed easily.   Psychiatric/Behavioral:  Positive for sleep disturbance.        Objective   /64 (BP Location: Left arm, Patient Position: Sitting, Cuff Size: Large)   Pulse 91    "Temp 97.6 °F (36.4 °C) (Temporal)   Ht 5' 9\" (1.753 m)   Wt 92.1 kg (203 lb)   SpO2 96%   BMI 29.98 kg/m²      Physical Exam  Vitals reviewed.   Constitutional:       Appearance: Normal appearance. He is well-developed.   HENT:      Head:      Comments: No sinus tenderness     Right Ear: Tympanic membrane and ear canal normal.      Left Ear: Tympanic membrane and ear canal normal.   Cardiovascular:      Rate and Rhythm: Normal rate and regular rhythm.      Heart sounds: Normal heart sounds.   Pulmonary:      Effort: Pulmonary effort is normal.      Breath sounds: Normal breath sounds.   Musculoskeletal:      Right lower leg: No edema.      Left lower leg: No edema.   Neurological:      General: No focal deficit present.      Mental Status: He is alert and oriented to person, place, and time.   Psychiatric:         Mood and Affect: Mood normal.         Behavior: Behavior normal.         Thought Content: Thought content normal.         Judgment: Judgment normal.         "

## 2025-02-11 ENCOUNTER — RESULTS FOLLOW-UP (OUTPATIENT)
Dept: FAMILY MEDICINE CLINIC | Facility: CLINIC | Age: 75
End: 2025-02-11

## 2025-02-26 ENCOUNTER — RA CDI HCC (OUTPATIENT)
Dept: OTHER | Facility: HOSPITAL | Age: 75
End: 2025-02-26

## 2025-03-03 ENCOUNTER — TELEPHONE (OUTPATIENT)
Dept: FAMILY MEDICINE CLINIC | Facility: CLINIC | Age: 75
End: 2025-03-03

## 2025-03-03 NOTE — TELEPHONE ENCOUNTER
"Called pt to reschedule AWV on 3/6 as Dr. CASTANEDA will be unavailable that afternoon - pt declined to reschedule, said \"we're just going to cancel it for now.\"  "

## 2025-04-03 DIAGNOSIS — J01.10 ACUTE NON-RECURRENT FRONTAL SINUSITIS: ICD-10-CM

## 2025-04-04 RX ORDER — FLUTICASONE PROPIONATE 50 MCG
SPRAY, SUSPENSION (ML) NASAL
Qty: 24 ML | Refills: 1 | Status: SHIPPED | OUTPATIENT
Start: 2025-04-04

## 2025-04-04 NOTE — TELEPHONE ENCOUNTER
Refill must be reviewed and completed by the office or provider. The refill is unable to be approved or denied by the medication management team.    Request for 90D supply - Please review to see if the refill is appropriate.

## 2025-04-05 DIAGNOSIS — M19.90 ARTHRITIS: ICD-10-CM

## 2025-05-15 ENCOUNTER — TELEPHONE (OUTPATIENT)
Dept: FAMILY MEDICINE CLINIC | Facility: CLINIC | Age: 75
End: 2025-05-15

## 2025-05-15 NOTE — TELEPHONE ENCOUNTER
----- Message from Joy CORTES sent at 4/25/2025  9:56 AM EDT -----  Regarding: awv  Patient due for AWV, please contact patient to schedule.

## 2025-05-15 NOTE — TELEPHONE ENCOUNTER
Patient called to schedule his annual appointment with Dr Augustin.  Scheduled her next available opening for ANNUAL WELLNESS VISIT, which was for 9/4/25, and placed patient on wait list.

## 2025-05-15 NOTE — TELEPHONE ENCOUNTER
Pt due for AWV. Asked if he could do it during his wife's appointment next week. Informed pt he would need to schedule his own apt because of 's full schedule. Pt said he would call back to book.

## 2025-05-28 DIAGNOSIS — J01.10 ACUTE NON-RECURRENT FRONTAL SINUSITIS: ICD-10-CM

## 2025-05-29 RX ORDER — FLUTICASONE PROPIONATE 50 MCG
SPRAY, SUSPENSION (ML) NASAL
Qty: 24 ML | Refills: 2 | Status: SHIPPED | OUTPATIENT
Start: 2025-05-29

## 2025-06-09 ENCOUNTER — TELEPHONE (OUTPATIENT)
Age: 75
End: 2025-06-09

## 2025-06-09 ENCOUNTER — OFFICE VISIT (OUTPATIENT)
Dept: FAMILY MEDICINE CLINIC | Facility: CLINIC | Age: 75
End: 2025-06-09
Payer: COMMERCIAL

## 2025-06-09 VITALS
HEART RATE: 92 BPM | WEIGHT: 203.4 LBS | SYSTOLIC BLOOD PRESSURE: 130 MMHG | DIASTOLIC BLOOD PRESSURE: 76 MMHG | OXYGEN SATURATION: 95 % | TEMPERATURE: 97.8 F | HEIGHT: 69 IN | BODY MASS INDEX: 30.13 KG/M2

## 2025-06-09 DIAGNOSIS — F41.9 ANXIETY: ICD-10-CM

## 2025-06-09 DIAGNOSIS — F32.A DEPRESSION, UNSPECIFIED DEPRESSION TYPE: Primary | ICD-10-CM

## 2025-06-09 PROCEDURE — G2211 COMPLEX E/M VISIT ADD ON: HCPCS | Performed by: FAMILY MEDICINE

## 2025-06-09 PROCEDURE — 99214 OFFICE O/P EST MOD 30 MIN: CPT | Performed by: FAMILY MEDICINE

## 2025-06-09 RX ORDER — SERTRALINE HYDROCHLORIDE 25 MG/1
25 TABLET, FILM COATED ORAL
Qty: 30 TABLET | Refills: 0 | Status: SHIPPED | OUTPATIENT
Start: 2025-06-09 | End: 2025-06-10 | Stop reason: SDUPTHER

## 2025-06-09 NOTE — PROGRESS NOTES
Name: Ronny Fisher      : 1950      MRN: [id number]  Encounter Provider: Abram Magallanes MD  Encounter Date: 2025   Encounter department: [facility]    Assessment & Plan  Anxiety  After discussion with patient we will start patient on Zoloft 25 mg daily for both depression and anxiety symptoms  Hopefully this will help with patient's motivation as well as preoccupation  Will follow-up in 4 weeks to discuss efficacy of medication PCOS with annual physical  Depression, unspecified depression type  Depression Screening Follow-up Plan: Patient's depression screening was positive with a PHQ-2 score of 3. Their PHQ-9 score was 4. Patient with underlying depression and was advised to continue current medications as prescribed. Patient advised to follow-up with PCP for further management.         History of Present Illness       Depression  Pertinent negatives include no abdominal pain, chest pain, chills, congestion, fever or headaches.       Ronny is a 74-year-old male patient presents to discuss medication.  Patient's wife was recently put on Zoloft and has noticed send of improvement in symptoms and patient would like to try similar type of medication.  He has significant anxiety and depression symptoms partially stemming from his health.  Patient is currently being treated for prostate cancer and is also battling chronic pain.  He has never been on antidepression/antianxiety medication in the past.  Would like to try a low-dose of Zoloft.        PHQ-2/9 Depression Screening    Little interest or pleasure in doing things: 1 - several days  Feeling down, depressed, or hopeless: 2 - more than half the days  Trouble falling or staying asleep, or sleeping too much: 0 - not at all  Feeling tired or having little energy: 0 - not at all  Poor appetite or overeatin - not at all  Feeling bad about yourself - or that you are a failure or have let yourself or your family down: 0 - not at  all  Trouble concentrating on things, such as reading the newspaper or watching television: 1 - several days  Moving or speaking so slowly that other people could have noticed. Or the opposite - being so fidgety or restless that you have been moving around a lot more than usual: 0 - not at all  Thoughts that you would be better off dead, or of hurting yourself in some way: 0 - not at all  PHQ-2 Score: 3  PHQ-2 Interpretation: POSITIVE depression screen  PHQ-9 Score: 4  PHQ-9 Interpretation: No or Minimal depression         CARROL-7 Flowsheet Screening      Flowsheet Row Most Recent Value   Over the last two weeks, how often have you been bothered by the following problems?     Feeling nervous, anxious, or on edge 0   Not being able to stop or control worrying 2   Worrying too much about different things 0   Trouble relaxing  2   Being so restless that it's hard to sit still 1   Becoming easily annoyed or irritable  1   Feeling afraid as if something awful might happen 1   How difficult have these problems made it for you to do your work, take care of things at home, or get along with other people?  Somewhat difficult   CARROL Score  7            Review of Systems   Constitutional:  Negative for chills and fever.   HENT:  Negative for congestion.    Respiratory:  Negative for shortness of breath.    Cardiovascular:  Negative for chest pain.   Gastrointestinal:  Negative for abdominal pain.        Burping a lot   Endocrine:        Hot flush from prostate cancer medication    Neurological:  Negative for dizziness, light-headedness and headaches.   Psychiatric/Behavioral:  Positive for depression, dysphoric mood and sleep disturbance. The patient is nervous/anxious.          Past Medical History[1]  Past Surgical History[2]  Family History[3]  Social History[4]  Medications[5]  No Known Allergies  Immunization History   Administered Date(s) Administered    COVID-19 MODERNA VACC 0.5 ML IM 02/04/2021, 03/03/2021, 07/18/2022     "COVID-19 Moderna Vac BIVALENT 12 Yr+ IM 0.5 ML 02/25/2023    COVID-19 Pfizer mRNA vacc PF ronel-sucrose 12 yr and older (Comirnaty) 09/21/2023, 09/24/2024    INFLUENZA 11/04/2010, 10/03/2011, 10/20/2012, 10/03/2013, 10/15/2014, 09/19/2015, 10/13/2016, 09/20/2017, 09/16/2018, 08/29/2021, 09/27/2022, 09/08/2023    Influenza Split High Dose Preservative Free IM 09/19/2015, 10/13/2016, 08/20/2020    Influenza, high dose seasonal 0.7 mL 08/20/2020    Pneumococcal Conjugate 13-Valent 07/29/2016, 09/01/2017    Pneumococcal Polysaccharide PPV23 07/13/2016, 09/24/2017    Respiratory Syncytial Virus Vaccine (Recombinant, Adjuvanted) 01/04/2024    Tdap 04/20/2011, 09/17/2019    Zoster 01/20/2011    Zoster Vaccine Recombinant 05/14/2019, 08/18/2019    influenza, trivalent, adjuvanted 09/12/2019, 09/24/2024     Objective   /76 (BP Location: Right arm, Patient Position: Sitting, Cuff Size: Standard)   Pulse 92   Temp 97.8 °F (36.6 °C)   Ht 5' 9\" (1.753 m)   Wt 92.3 kg (203 lb 6.4 oz)   SpO2 95%   BMI 30.04 kg/m²     Physical Exam  Vitals reviewed.   Constitutional:       General: He is not in acute distress.     Appearance: Normal appearance. He is obese. He is not toxic-appearing.     Cardiovascular:      Rate and Rhythm: Normal rate.      Pulses: Normal pulses.   Pulmonary:      Effort: Pulmonary effort is normal.   Abdominal:      General: Abdomen is flat.     Musculoskeletal:         General: No swelling, tenderness or deformity.     Skin:     General: Skin is warm and dry.      Capillary Refill: Capillary refill takes less than 2 seconds.      Coloration: Skin is not jaundiced.      Comments: Small firm nodular lesion less than 1 cm noted on medial edge of the right breast     Neurological:      General: No focal deficit present.      Mental Status: He is alert.      Comments: Tik like movement of the lip    Psychiatric:         Mood and Affect: Mood normal.           Abram Magallanes M.D.  [facility]    Please " "excuse any \"sound-alike\" errors that may have ocurred during the process of dictation. Parts of this note have been dictated and there may be errors present in the transcription process. Thank you.         [1]   Past Medical History:  Diagnosis Date    Disease of thyroid gland     last assessed 10/13/2016    History of radiation therapy     Impression 02/18/2016, of thymus as an infant    Primary osteoarthritis of left knee     last assessed 04/13/2016    Prostate cancer (HCC)    [2]   Past Surgical History:  Procedure Laterality Date    COLONOSCOPY      COLONOSCOPY      [place] BLEPHAROPLASTY UPPER EYELID W/EXCESSIVE SKIN Bilateral 12/5/2022    Procedure: BLEPHAROPLASTY UPPER;  Surgeon: Hair Cespedes MD;  Location:  MAIN OR;  Service: Plastics    PROSTATE BIOPSY      REPLACEMENT TOTAL KNEE Left     TOTAL SHOULDER REPLACEMENT Right    [3]   Family History  Problem Relation Name Age of Onset    Heart disease Father      Heart failure Father      Dementia Mother     [4]   Social History  Tobacco Use    Smoking status: Never     Passive exposure: Past    Smokeless tobacco: Never   Vaping Use    Vaping status: Never Used   Substance and Sexual Activity    Alcohol use: Yes     Alcohol/week: 14.0 standard drinks of alcohol     Types: 14 Glasses of wine per week     Comment: couple glasses of wine every night    Drug use: Never   [5]   Current Outpatient Medications on File Prior to Visit   Medication Sig    acetaminophen (TYLENOL) 500 mg tablet Take 500 mg by mouth every 6 (six) hours as needed for mild pain    Calcium-Vitamin D-Vitamin K (VIACTIV CALCIUM PLUS D PO) Take by mouth    Cholecalciferol 25 MCG (1000 UT) capsule Take 1,000 Units by mouth in the morning.    Diclofenac Sodium (VOLTAREN) 1 % APPLY 2 GRAMS TO AFFECTED AREA 4 TIMES A DAY    enzalutamide (XTANDI) 40 mg capsule Take 160 mg by mouth in the morning.    leuprolide (ELIGARD 6 MONTH KIT) 45 MG subcutaneous injection Inject 45 mg under the skin every 3 " (three) months    [facility] Natural Products (GLUCOSAMINE CHONDROITIN ADV PO) Take 1 tablet by mouth in the morning.    Omega-3 Fatty Acids (FISH OIL OMEGA-3) 1000 MG CAPS Take 2 g by mouth in the morning.    ciclopirox (PENLAC) 8 % solution APPLY ENOUGH TO COVER TOPICALLY DAILY. (Patient not taking: Reported on 2/10/2025)    minoxidil (ROGAINE) 2 % external solution Apply topically (Patient not taking: Reported on 2/10/2025)    Multiple Vitamins-Minerals (VITAMIN D3 COMPLETE PO) Take by mouth (Patient not taking: Reported on 12/18/2023)    SALINE NASAL SPRAY NA into each nostril (Patient not taking: Reported on 7/18/2024)    [DISCONTINUED] fluticasone (FLONASE) 50 mcg/act nasal spray SPRAY 1 SPRAY INTO EACH NOSTRIL EVERY DAY (Patient not taking: Reported on 6/9/2025)

## 2025-06-09 NOTE — TELEPHONE ENCOUNTER
Patient called to say the pharmacy did not receive the prescription sent by Dr. Magallanes for the sertraline 25 mg.  Wife would like this to be reordered as the pharmacy told her they did not receive the script.  Thank you.

## 2025-06-10 DIAGNOSIS — F32.A DEPRESSION, UNSPECIFIED DEPRESSION TYPE: ICD-10-CM

## 2025-06-10 DIAGNOSIS — F41.9 ANXIETY: ICD-10-CM

## 2025-06-10 RX ORDER — SERTRALINE HYDROCHLORIDE 25 MG/1
25 TABLET, FILM COATED ORAL
Qty: 30 TABLET | Refills: 0 | Status: SHIPPED | OUTPATIENT
Start: 2025-06-10 | End: 2025-07-10

## 2025-06-23 ENCOUNTER — TELEPHONE (OUTPATIENT)
Dept: FAMILY MEDICINE CLINIC | Facility: CLINIC | Age: 75
End: 2025-06-23

## 2025-06-23 DIAGNOSIS — L72.3 SEBACEOUS CYST: Primary | ICD-10-CM

## 2025-06-25 ENCOUNTER — CONSULT (OUTPATIENT)
Dept: SURGERY | Facility: CLINIC | Age: 75
End: 2025-06-25
Attending: FAMILY MEDICINE
Payer: COMMERCIAL

## 2025-06-25 VITALS
TEMPERATURE: 96.3 F | WEIGHT: 201 LBS | BODY MASS INDEX: 28.77 KG/M2 | DIASTOLIC BLOOD PRESSURE: 72 MMHG | HEIGHT: 70 IN | SYSTOLIC BLOOD PRESSURE: 120 MMHG | OXYGEN SATURATION: 94 % | HEART RATE: 69 BPM

## 2025-06-25 DIAGNOSIS — L72.3 SEBACEOUS CYST: ICD-10-CM

## 2025-06-25 PROCEDURE — 99202 OFFICE O/P NEW SF 15 MIN: CPT | Performed by: SURGERY

## 2025-06-25 PROCEDURE — 10060 I&D ABSCESS SIMPLE/SINGLE: CPT | Performed by: SURGERY

## 2025-06-25 NOTE — PATIENT INSTRUCTIONS
Pull out packing in the morning in the shower  Shower twice a day for the next few days to a week  Dry dressing over  No public swimming pools  If not closed in 2 weeks, give a call

## 2025-06-25 NOTE — PROGRESS NOTES
"The patient is a 74-year-old male with a longstanding history of a small sebaceous cyst on his anterior chest wall.  However, over the last couple days this has grown in size and started to hurt.  On examination it is slightly over 1 cm with a fluctuant area in the middle and redness around.  This is an infected sebaceous cyst and drainage was done in the office here today    Incision and Drainage    Date/Time: 6/25/2025 11:00 AM    Performed by: Robert Bloch, MD  Authorized by: Robert Bloch, MD    Universal Protocol:  Consent: Written consent obtained  Consent given by: patient  Time out: Immediately prior to procedure a \"time out\" was called to verify the correct patient, procedure, equipment, support staff and site/side marked as required.  Patient identity confirmed: verbally with patient    Patient location:  Clinic  Location:     Type:  Abscess and cyst    Location:  Trunk    Trunk location:  Chest  Pre-procedure details:     Skin preparation:  Betadine  Anesthesia (see MAR for exact dosages):     Anesthesia method:  Local infiltration    Local anesthetic:  Lidocaine 1% WITH epi  Procedure details:     Complexity:  Intermediate    Incision types:  Elliptical    Scalpel blade:  15    Approach:  Puncture    Incision depth:  Subcutaneous    Wound management:  Probed and deloculated and debrided    Drainage:  Purulent    Drainage amount:  Scant    Wound treatment:  Packing placed    Packing materials:  1/2 in gauze  Post-procedure details:     Patient tolerance of procedure:  Tolerated well, no immediate complications  Comments:      Patient was identified by me and placed in the supine position on the operating room table.  The area was marked and then prepped and draped in a normal surgical manner.  Local was infiltrated and then elliptical skin incision is made into the abscess.  Sebum and pus are removed and the cyst wall is completely removed.  Wound is now packed and a dressing applied     "

## 2025-06-29 ENCOUNTER — RA CDI HCC (OUTPATIENT)
Dept: OTHER | Facility: HOSPITAL | Age: 75
End: 2025-06-29

## 2025-07-02 DIAGNOSIS — F41.9 ANXIETY: ICD-10-CM

## 2025-07-02 DIAGNOSIS — F32.A DEPRESSION, UNSPECIFIED DEPRESSION TYPE: ICD-10-CM

## 2025-07-03 RX ORDER — SERTRALINE HYDROCHLORIDE 25 MG/1
25 TABLET, FILM COATED ORAL
Qty: 90 TABLET | Refills: 1 | Status: SHIPPED | OUTPATIENT
Start: 2025-07-03 | End: 2025-07-08

## 2025-07-08 ENCOUNTER — OFFICE VISIT (OUTPATIENT)
Dept: FAMILY MEDICINE CLINIC | Facility: CLINIC | Age: 75
End: 2025-07-08
Payer: COMMERCIAL

## 2025-07-08 VITALS
TEMPERATURE: 98 F | HEIGHT: 69 IN | OXYGEN SATURATION: 97 % | WEIGHT: 202.4 LBS | SYSTOLIC BLOOD PRESSURE: 130 MMHG | HEART RATE: 90 BPM | DIASTOLIC BLOOD PRESSURE: 80 MMHG | BODY MASS INDEX: 29.98 KG/M2

## 2025-07-08 DIAGNOSIS — F41.9 ANXIETY: Primary | ICD-10-CM

## 2025-07-08 DIAGNOSIS — F32.A DEPRESSION, UNSPECIFIED DEPRESSION TYPE: ICD-10-CM

## 2025-07-08 PROCEDURE — 99214 OFFICE O/P EST MOD 30 MIN: CPT | Performed by: FAMILY MEDICINE

## 2025-07-08 PROCEDURE — G2211 COMPLEX E/M VISIT ADD ON: HCPCS | Performed by: FAMILY MEDICINE

## 2025-07-08 NOTE — PROGRESS NOTES
Name: Ronny Fisher      : 1950      MRN: [id number]  Encounter Provider: Abram Magallanes MD  Encounter Date: 2025   Encounter department: [facility]    Assessment & Plan  Anxiety  Increase dosage of Zoloft from 25 mg to 50 mg at nighttime  Continue this over the next 4 weeks  If patient is happy with how his anxiety and depression is controlled with 50 mg he may continue this once at night  If he continues to have significant anxiety and depressive symptoms after 4 weeks he may increase this to 100 mg at bedtime  Orders:    sertraline (ZOLOFT) 50 mg tablet; Take 1 tablet (50 mg total) by mouth daily at bedtime    Depression, unspecified depression type  See above  Orders:    sertraline (ZOLOFT) 50 mg tablet; Take 1 tablet (50 mg total) by mouth daily at bedtime         History of Present Illness       HPI    Mr. Fisher is a 74-year-old male patient presents for follow-up regarding his most recent visit.  Patient was started on Zoloft 25 mg for depression anxiety symptoms.  Probably contributing to patient's symptom is his prostate cancer treatment.  Patient does have low testosterone levels.  He has noticed some improvement with medication.  According to his wife patient is smiling more.  Patient reported better sleep.  Agreeable with increasing dosage of medication.     Review of Systems   Constitutional:  Negative for chills and fever.   HENT:  Negative for congestion.    Respiratory:  Negative for chest tightness and shortness of breath.    Cardiovascular:  Negative for chest pain.   Gastrointestinal:  Negative for abdominal pain.   Neurological:  Negative for dizziness, light-headedness and headaches.       Past Medical History[1]  Past Surgical History[2]  Family History[3]  Social History[4]  Medications[5]  No Known Allergies  Immunization History   Administered Date(s) Administered    COVID-19 MODERNA VACC 0.5 ML IM 2021, 2021, 2022    COVID-19 Moderna  "Vac BIVALENT 12 Yr+ IM 0.5 ML 02/25/2023    COVID-19 Pfizer mRNA vacc PF ronel-sucrose 12 yr and older (Comirnaty) 09/21/2023, 09/24/2024    INFLUENZA 11/04/2010, 10/03/2011, 10/20/2012, 10/03/2013, 10/15/2014, 09/19/2015, 10/13/2016, 09/20/2017, 09/16/2018, 08/29/2021, 09/27/2022, 09/08/2023    Influenza Split High Dose Preservative Free IM 09/19/2015, 10/13/2016, 08/20/2020    Influenza, high dose seasonal 0.7 mL 08/20/2020    Pneumococcal Conjugate 13-Valent 07/29/2016, 09/01/2017    Pneumococcal Polysaccharide PPV23 07/13/2016, 09/24/2017    Respiratory Syncytial Virus Vaccine (Recombinant, Adjuvanted) 01/04/2024    Tdap 04/20/2011, 09/17/2019    Zoster 01/20/2011    Zoster Vaccine Recombinant 05/14/2019, 08/18/2019    influenza, trivalent, adjuvanted 09/12/2019, 09/24/2024     Objective   /80 (BP Location: Left arm, Patient Position: Sitting, Cuff Size: Large)   Pulse 90   Temp 98 °F (36.7 °C) (Temporal)   Ht 5' 9\" (1.753 m)   Wt 91.8 kg (202 lb 6.4 oz)   SpO2 97%   BMI 29.89 kg/m²     Physical Exam  Vitals reviewed.   Constitutional:       General: He is not in acute distress.     Appearance: Normal appearance. He is obese. He is not ill-appearing, toxic-appearing or diaphoretic.     Cardiovascular:      Rate and Rhythm: Normal rate.      Pulses: Normal pulses.   Pulmonary:      Effort: Pulmonary effort is normal.   Abdominal:      General: Abdomen is flat.     Skin:     General: Skin is warm and dry.      Capillary Refill: Capillary refill takes less than 2 seconds.      Coloration: Skin is not jaundiced.      Comments: Sebaceous cyst on the anterior chest appears to be healing well     Neurological:      General: No focal deficit present.      Mental Status: He is alert and oriented to person, place, and time.     Psychiatric:         Mood and Affect: Mood normal.         Abram Magallanes, M.D.  Family Medicine    Please excuse any \"sound-alike\" errors that may have ocurred during the process of " dictation. Parts of this note have been dictated and there may be errors present in the transcription process. Thank you.         [1]   Past Medical History:  Diagnosis Date    Disease of thyroid gland     last assessed 10/13/2016    Ear problems     cerumen    History of radiation therapy     Impression 02/18/2016, of thymus as an infant    HL (hearing loss)     Primary osteoarthritis of left knee     last assessed 04/13/2016    Prostate cancer (HCC)    [2]   Past Surgical History:  Procedure Laterality Date    COLONOSCOPY      COLONOSCOPY      [place] BLEPHAROPLASTY UPPER EYELID W/EXCESSIVE SKIN Bilateral 12/05/2022    Procedure: BLEPHAROPLASTY UPPER;  Surgeon: Hair Cespedes MD;  Location:  MAIN OR;  Service: Plastics    PROSTATE BIOPSY      REPLACEMENT TOTAL KNEE Left     TONSILLECTOMY      TOTAL SHOULDER REPLACEMENT Right    [3]   Family History  Problem Relation Name Age of Onset    Heart disease Father      Heart failure Father      Dementia Mother     [4]   Social History  Tobacco Use    Smoking status: Never     Passive exposure: Past    Smokeless tobacco: Never   Vaping Use    Vaping status: Never Used   Substance and Sexual Activity    Alcohol use: Yes     Alcohol/week: 14.0 standard drinks of alcohol     Types: 14 Glasses of wine per week     Comment: couple glasses of wine every night    Drug use: Never   [5]   Current Outpatient Medications on File Prior to Visit   Medication Sig    acetaminophen (TYLENOL) 500 mg tablet Take 500 mg by mouth every 6 (six) hours as needed for mild pain    Calcium-Vitamin D-Vitamin K (VIACTIV CALCIUM PLUS D PO) Take by mouth    Cholecalciferol 25 MCG (1000 UT) capsule Take 1,000 Units by mouth in the morning.    Diclofenac Sodium (VOLTAREN) 1 % APPLY 2 GRAMS TO AFFECTED AREA 4 TIMES A DAY    enzalutamide (XTANDI) 40 mg capsule Take 160 mg by mouth in the morning.    leuprolide (ELIGARD 6 MONTH KIT) 45 MG subcutaneous injection Inject 45 mg under the skin every 3 (three)  months    Misc Natural Products (GLUCOSAMINE CHONDROITIN ADV PO) Take 1 tablet by mouth in the morning.    Omega-3 Fatty Acids (FISH OIL OMEGA-3) 1000 MG CAPS Take 2 g by mouth in the morning.    sertraline (ZOLOFT) 25 mg tablet TAKE 1 TABLET BY MOUTH DAILY AT BEDTIME    ciclopirox (PENLAC) 8 % solution APPLY ENOUGH TO COVER TOPICALLY DAILY. (Patient not taking: Reported on 2/10/2025)    minoxidil (ROGAINE) 2 % external solution Apply topically (Patient not taking: Reported on 2/10/2025)    Multiple Vitamins-Minerals (VITAMIN D3 COMPLETE PO) Take by mouth (Patient not taking: Reported on 12/18/2023)    SALINE NASAL SPRAY NA into each nostril (Patient not taking: Reported on 7/18/2024)

## (undated) DEVICE — DISPOSABLE OR TOWEL: Brand: CARDINAL HEALTH

## (undated) DEVICE — PENCIL ELECTROSURG E-Z CLEAN -0035H

## (undated) DEVICE — SYRINGE 30ML LL

## (undated) DEVICE — LIGHT GLOVE GREEN

## (undated) DEVICE — STERILE POLYISOPRENE POWDER-FREE SURGICAL GLOVES: Brand: PROTEXIS

## (undated) DEVICE — SPONGE 4 X 4 XRAY 16 PLY STRL LF RFD

## (undated) DEVICE — SUT ETHILON 6-0 P-3 18 IN 1698G

## (undated) DEVICE — SKIN MARKER DUAL TIP WITH RULER CAP, FLEXIBLE RULER AND LABELS: Brand: DEVON

## (undated) DEVICE — INTENDED FOR TISSUE SEPARATION, AND OTHER PROCEDURES THAT REQUIRE A SHARP SURGICAL BLADE TO PUNCTURE OR CUT.: Brand: BARD-PARKER ® SAFETYLOCK CARBON RIB-BACK BLADES

## (undated) DEVICE — BASIC PACK: Brand: CONVERTORS

## (undated) DEVICE — GLOVE SRG DERMASSURE SZ 7

## (undated) DEVICE — NEEDLE BLUNT 18 G X 1 1/2IN

## (undated) DEVICE — SUT PLAIN 5-0 PC-1 18 IN 1915G

## (undated) DEVICE — COTTON TIP APPLICTOR 2 PK

## (undated) DEVICE — SCD SEQUENTIAL COMPRESSION COMFORT SLEEVE MEDIUM KNEE LENGTH: Brand: KENDALL SCD

## (undated) DEVICE — ELECTRODE NEEDLE MEGAFINE 2IN E-Z CLEAN MEGADYNE -0118

## (undated) DEVICE — 1820 FOAM BLOCK NEEDLE COUNTER: Brand: DEVON

## (undated) DEVICE — SYRINGE 10ML LL

## (undated) DEVICE — STANDARD SURGICAL GOWN, L: Brand: CONVERTORS

## (undated) DEVICE — TELFA NON-ADHERENT ABSORBENT DRESSING: Brand: TELFA

## (undated) DEVICE — TIBURON SPLIT SHEET: Brand: CONVERTORS

## (undated) DEVICE — NEEDLE 27 G X 1 1/2